# Patient Record
Sex: MALE | Race: WHITE | NOT HISPANIC OR LATINO | Employment: PART TIME | URBAN - METROPOLITAN AREA
[De-identification: names, ages, dates, MRNs, and addresses within clinical notes are randomized per-mention and may not be internally consistent; named-entity substitution may affect disease eponyms.]

---

## 2017-05-04 ENCOUNTER — APPOINTMENT (OUTPATIENT)
Dept: LAB | Facility: HOSPITAL | Age: 57
End: 2017-05-04

## 2017-05-04 ENCOUNTER — TELEPHONE (OUTPATIENT)
Dept: INTERNAL MEDICINE | Facility: CLINIC | Age: 57
End: 2017-05-04

## 2017-05-04 DIAGNOSIS — R50.81 FEVER IN OTHER DISEASES: Primary | ICD-10-CM

## 2017-05-04 PROBLEM — R50.9 FEVER: Status: ACTIVE | Noted: 2017-05-04

## 2017-05-04 LAB
BACTERIA UR QL AUTO: ABNORMAL /HPF
BILIRUB UR QL STRIP: NEGATIVE
CLARITY UR: CLEAR
COLOR UR: YELLOW
GLUCOSE UR STRIP-MCNC: NEGATIVE MG/DL
HGB UR QL STRIP.AUTO: NEGATIVE
HYALINE CASTS UR QL AUTO: ABNORMAL /LPF
KETONES UR QL STRIP: NEGATIVE
LEUKOCYTE ESTERASE UR QL STRIP.AUTO: ABNORMAL
NITRITE UR QL STRIP: NEGATIVE
PH UR STRIP.AUTO: 6 [PH] (ref 5–8)
PROT UR QL STRIP: NEGATIVE
RBC # UR: ABNORMAL /HPF
REF LAB TEST METHOD: ABNORMAL
SP GR UR STRIP: 1.01 (ref 1–1.03)
SQUAMOUS #/AREA URNS HPF: ABNORMAL /HPF
UROBILINOGEN UR QL STRIP: ABNORMAL
WBC UR QL AUTO: ABNORMAL /HPF

## 2017-05-05 ENCOUNTER — OFFICE VISIT (OUTPATIENT)
Dept: INTERNAL MEDICINE | Facility: CLINIC | Age: 57
End: 2017-05-05

## 2017-05-05 VITALS
SYSTOLIC BLOOD PRESSURE: 144 MMHG | TEMPERATURE: 97.4 F | OXYGEN SATURATION: 99 % | DIASTOLIC BLOOD PRESSURE: 84 MMHG | HEART RATE: 64 BPM | RESPIRATION RATE: 16 BRPM

## 2017-05-05 DIAGNOSIS — R50.9 FEVER, UNSPECIFIED FEVER CAUSE: ICD-10-CM

## 2017-05-05 DIAGNOSIS — N39.0 URINARY TRACT INFECTION, SITE UNSPECIFIED: Primary | ICD-10-CM

## 2017-05-05 LAB
ALBUMIN SERPL-MCNC: 3.6 G/DL (ref 3.2–4.8)
ALBUMIN/GLOB SERPL: 1.7 G/DL (ref 1.5–2.5)
ALP SERPL-CCNC: 42 U/L (ref 25–100)
ALT SERPL W P-5'-P-CCNC: 16 U/L (ref 7–40)
ANION GAP SERPL CALCULATED.3IONS-SCNC: 10 MMOL/L (ref 3–11)
AST SERPL-CCNC: 21 U/L (ref 0–33)
BASOPHILS # BLD AUTO: 0.02 10*3/MM3 (ref 0–0.2)
BASOPHILS NFR BLD AUTO: 0.3 % (ref 0–1)
BILIRUB SERPL-MCNC: 0.8 MG/DL (ref 0.3–1.2)
BUN BLD-MCNC: 26 MG/DL (ref 9–23)
BUN/CREAT SERPL: 13.7 (ref 7–25)
CALCIUM SPEC-SCNC: 8.6 MG/DL (ref 8.7–10.4)
CHLORIDE SERPL-SCNC: 88 MMOL/L (ref 99–109)
CO2 SERPL-SCNC: 20 MMOL/L (ref 20–31)
CREAT BLD-MCNC: 1.9 MG/DL (ref 0.6–1.3)
CRP SERPL-MCNC: 5.07 MG/DL (ref 0–1)
DEPRECATED RDW RBC AUTO: 54.1 FL (ref 37–54)
EOSINOPHIL # BLD AUTO: 0.15 10*3/MM3 (ref 0.1–0.3)
EOSINOPHIL NFR BLD AUTO: 2.4 % (ref 0–3)
ERYTHROCYTE [DISTWIDTH] IN BLOOD BY AUTOMATED COUNT: 14.8 % (ref 11.3–14.5)
GFR SERPL CREATININE-BSD FRML MDRD: 37 ML/MIN/1.73
GLOBULIN UR ELPH-MCNC: 2.1 GM/DL
GLUCOSE BLD-MCNC: 102 MG/DL (ref 70–100)
HCT VFR BLD AUTO: 39 % (ref 38.9–50.9)
HGB BLD-MCNC: 13.2 G/DL (ref 13.1–17.5)
IMM GRANULOCYTES # BLD: 0.06 10*3/MM3 (ref 0–0.03)
IMM GRANULOCYTES NFR BLD: 1 % (ref 0–0.6)
LARGE PLATELETS: NORMAL
LYMPHOCYTES # BLD AUTO: 0.61 10*3/MM3 (ref 0.6–4.8)
LYMPHOCYTES NFR BLD AUTO: 9.8 % (ref 24–44)
MCH RBC QN AUTO: 33.4 PG (ref 27–31)
MCHC RBC AUTO-ENTMCNC: 33.8 G/DL (ref 32–36)
MCV RBC AUTO: 98.7 FL (ref 80–99)
MONOCYTES # BLD AUTO: 0.85 10*3/MM3 (ref 0–1)
MONOCYTES NFR BLD AUTO: 13.7 % (ref 0–12)
NEUTROPHILS # BLD AUTO: 4.52 10*3/MM3 (ref 1.5–8.3)
NEUTROPHILS NFR BLD AUTO: 72.8 % (ref 41–71)
PLATELET # BLD AUTO: 123 10*3/MM3 (ref 150–450)
PMV BLD AUTO: 10.8 FL (ref 6–12)
POTASSIUM BLD-SCNC: 4.3 MMOL/L (ref 3.5–5.5)
PROT SERPL-MCNC: 5.7 G/DL (ref 5.7–8.2)
RBC # BLD AUTO: 3.95 10*6/MM3 (ref 4.2–5.76)
RBC MORPH BLD: NORMAL
SODIUM BLD-SCNC: 118 MMOL/L (ref 132–146)
WBC MORPH BLD: NORMAL
WBC NRBC COR # BLD: 6.21 10*3/MM3 (ref 3.5–10.8)

## 2017-05-05 PROCEDURE — 99213 OFFICE O/P EST LOW 20 MIN: CPT | Performed by: NURSE PRACTITIONER

## 2017-05-05 PROCEDURE — 36415 COLL VENOUS BLD VENIPUNCTURE: CPT | Performed by: NURSE PRACTITIONER

## 2017-05-06 ENCOUNTER — APPOINTMENT (OUTPATIENT)
Dept: GENERAL RADIOLOGY | Facility: HOSPITAL | Age: 57
End: 2017-05-06

## 2017-05-06 ENCOUNTER — HOSPITAL ENCOUNTER (INPATIENT)
Facility: HOSPITAL | Age: 57
LOS: 3 days | Discharge: HOME | End: 2017-05-09
Attending: EMERGENCY MEDICINE | Admitting: INTERNAL MEDICINE

## 2017-05-06 DIAGNOSIS — E87.1 HYPONATREMIA: ICD-10-CM

## 2017-05-06 DIAGNOSIS — N39.0 SEPSIS DUE TO URINARY TRACT INFECTION (HCC): Primary | ICD-10-CM

## 2017-05-06 DIAGNOSIS — N17.9 ACUTE RENAL INJURY (HCC): ICD-10-CM

## 2017-05-06 DIAGNOSIS — A41.9 SEPSIS DUE TO URINARY TRACT INFECTION (HCC): Primary | ICD-10-CM

## 2017-05-06 LAB
ALBUMIN SERPL-MCNC: 3.8 G/DL (ref 3.2–4.8)
ALBUMIN/GLOB SERPL: 1.4 G/DL (ref 1.5–2.5)
ALP SERPL-CCNC: 41 U/L (ref 25–100)
ALT SERPL W P-5'-P-CCNC: 18 U/L (ref 7–40)
AMYLASE SERPL-CCNC: 117 U/L (ref 30–118)
ANION GAP SERPL CALCULATED.3IONS-SCNC: 13 MMOL/L (ref 3–11)
AST SERPL-CCNC: 24 U/L (ref 0–33)
BACTERIA SPEC AEROBE CULT: ABNORMAL
BACTERIA UR QL AUTO: ABNORMAL /HPF
BASOPHILS # BLD AUTO: 0.03 10*3/MM3 (ref 0–0.2)
BASOPHILS NFR BLD AUTO: 0.4 % (ref 0–1)
BILIRUB SERPL-MCNC: 0.7 MG/DL (ref 0.3–1.2)
BILIRUB UR QL STRIP: NEGATIVE
BNP SERPL-MCNC: 185 PG/ML (ref 0–100)
BUN BLD-MCNC: 26 MG/DL (ref 9–23)
BUN/CREAT SERPL: 12.4 (ref 7–25)
CALCIUM SPEC-SCNC: 9.3 MG/DL (ref 8.7–10.4)
CHLORIDE SERPL-SCNC: 90 MMOL/L (ref 99–109)
CLARITY UR: CLEAR
CO2 SERPL-SCNC: 21 MMOL/L (ref 20–31)
COLOR UR: YELLOW
CREAT BLD-MCNC: 2.1 MG/DL (ref 0.6–1.3)
CRP SERPL-MCNC: 5.54 MG/DL (ref 0–1)
D-LACTATE SERPL-SCNC: 0.6 MMOL/L (ref 0.5–2)
D-LACTATE SERPL-SCNC: 0.9 MMOL/L (ref 0.5–2)
DEPRECATED RDW RBC AUTO: 53.3 FL (ref 37–54)
EOSINOPHIL # BLD AUTO: 0.09 10*3/MM3 (ref 0.1–0.3)
EOSINOPHIL NFR BLD AUTO: 1.1 % (ref 0–3)
ERYTHROCYTE [DISTWIDTH] IN BLOOD BY AUTOMATED COUNT: 14.7 % (ref 11.3–14.5)
GFR SERPL CREATININE-BSD FRML MDRD: 33 ML/MIN/1.73
GLOBULIN UR ELPH-MCNC: 2.7 GM/DL
GLUCOSE BLD-MCNC: 101 MG/DL (ref 70–100)
GLUCOSE UR STRIP-MCNC: NEGATIVE MG/DL
HCT VFR BLD AUTO: 43.4 % (ref 38.9–50.9)
HGB BLD-MCNC: 14.5 G/DL (ref 13.1–17.5)
HGB UR QL STRIP.AUTO: NEGATIVE
HOLD SPECIMEN: NORMAL
HYALINE CASTS UR QL AUTO: ABNORMAL /LPF
IMM GRANULOCYTES # BLD: 0.02 10*3/MM3 (ref 0–0.03)
IMM GRANULOCYTES NFR BLD: 0.2 % (ref 0–0.6)
KETONES UR QL STRIP: ABNORMAL
LEUKOCYTE ESTERASE UR QL STRIP.AUTO: ABNORMAL
LIPASE SERPL-CCNC: 210 U/L (ref 6–51)
LYMPHOCYTES # BLD AUTO: 0.54 10*3/MM3 (ref 0.6–4.8)
LYMPHOCYTES NFR BLD AUTO: 6.4 % (ref 24–44)
MAGNESIUM SERPL-MCNC: 1.7 MG/DL (ref 1.3–2.7)
MCH RBC QN AUTO: 33.1 PG (ref 27–31)
MCHC RBC AUTO-ENTMCNC: 33.4 G/DL (ref 32–36)
MCV RBC AUTO: 99.1 FL (ref 80–99)
MONOCYTES # BLD AUTO: 1.28 10*3/MM3 (ref 0–1)
MONOCYTES NFR BLD AUTO: 15.3 % (ref 0–12)
NEUTROPHILS # BLD AUTO: 6.43 10*3/MM3 (ref 1.5–8.3)
NEUTROPHILS NFR BLD AUTO: 76.6 % (ref 41–71)
NITRITE UR QL STRIP: NEGATIVE
PH UR STRIP.AUTO: 5.5 [PH] (ref 5–8)
PLATELET # BLD AUTO: 181 10*3/MM3 (ref 150–450)
PMV BLD AUTO: 9.9 FL (ref 6–12)
POTASSIUM BLD-SCNC: 4.2 MMOL/L (ref 3.5–5.5)
PROT SERPL-MCNC: 6.5 G/DL (ref 5.7–8.2)
PROT UR QL STRIP: NEGATIVE
RBC # BLD AUTO: 4.38 10*6/MM3 (ref 4.2–5.76)
RBC # UR: ABNORMAL /HPF
REF LAB TEST METHOD: ABNORMAL
SODIUM BLD-SCNC: 124 MMOL/L (ref 132–146)
SP GR UR STRIP: 1.01 (ref 1–1.03)
SQUAMOUS #/AREA URNS HPF: ABNORMAL /HPF
UROBILINOGEN UR QL STRIP: ABNORMAL
WBC NRBC COR # BLD: 8.39 10*3/MM3 (ref 3.5–10.8)
WBC UR QL AUTO: ABNORMAL /HPF
WHOLE BLOOD HOLD SPECIMEN: NORMAL
WHOLE BLOOD HOLD SPECIMEN: NORMAL

## 2017-05-06 PROCEDURE — 87077 CULTURE AEROBIC IDENTIFY: CPT | Performed by: EMERGENCY MEDICINE

## 2017-05-06 PROCEDURE — 86140 C-REACTIVE PROTEIN: CPT | Performed by: EMERGENCY MEDICINE

## 2017-05-06 PROCEDURE — 83605 ASSAY OF LACTIC ACID: CPT | Performed by: FAMILY MEDICINE

## 2017-05-06 PROCEDURE — 80053 COMPREHEN METABOLIC PANEL: CPT | Performed by: EMERGENCY MEDICINE

## 2017-05-06 PROCEDURE — 81001 URINALYSIS AUTO W/SCOPE: CPT | Performed by: EMERGENCY MEDICINE

## 2017-05-06 PROCEDURE — 87086 URINE CULTURE/COLONY COUNT: CPT | Performed by: EMERGENCY MEDICINE

## 2017-05-06 PROCEDURE — 83690 ASSAY OF LIPASE: CPT | Performed by: EMERGENCY MEDICINE

## 2017-05-06 PROCEDURE — 25010000002 HEPARIN (PORCINE) PER 1000 UNITS: Performed by: FAMILY MEDICINE

## 2017-05-06 PROCEDURE — 99284 EMERGENCY DEPT VISIT MOD MDM: CPT

## 2017-05-06 PROCEDURE — 25010000003 CEFTRIAXONE PER 250 MG: Performed by: EMERGENCY MEDICINE

## 2017-05-06 PROCEDURE — 82150 ASSAY OF AMYLASE: CPT | Performed by: EMERGENCY MEDICINE

## 2017-05-06 PROCEDURE — 85025 COMPLETE CBC W/AUTO DIFF WBC: CPT | Performed by: EMERGENCY MEDICINE

## 2017-05-06 PROCEDURE — 99223 1ST HOSP IP/OBS HIGH 75: CPT | Performed by: FAMILY MEDICINE

## 2017-05-06 PROCEDURE — 87040 BLOOD CULTURE FOR BACTERIA: CPT | Performed by: EMERGENCY MEDICINE

## 2017-05-06 PROCEDURE — 83880 ASSAY OF NATRIURETIC PEPTIDE: CPT | Performed by: EMERGENCY MEDICINE

## 2017-05-06 PROCEDURE — 71010 HC CHEST PA OR AP: CPT

## 2017-05-06 PROCEDURE — 87186 SC STD MICRODIL/AGAR DIL: CPT | Performed by: EMERGENCY MEDICINE

## 2017-05-06 PROCEDURE — 83735 ASSAY OF MAGNESIUM: CPT | Performed by: EMERGENCY MEDICINE

## 2017-05-06 PROCEDURE — 83605 ASSAY OF LACTIC ACID: CPT | Performed by: EMERGENCY MEDICINE

## 2017-05-06 RX ORDER — OXYBUTYNIN CHLORIDE 5 MG/1
5 TABLET ORAL 3 TIMES DAILY
Status: DISCONTINUED | OUTPATIENT
Start: 2017-05-06 | End: 2017-05-09 | Stop reason: HOSPADM

## 2017-05-06 RX ORDER — CALCIUM CARBONATE 200(500)MG
2 TABLET,CHEWABLE ORAL 2 TIMES DAILY PRN
Status: DISCONTINUED | OUTPATIENT
Start: 2017-05-06 | End: 2017-05-09 | Stop reason: HOSPADM

## 2017-05-06 RX ORDER — MULTIPLE VITAMINS W/ MINERALS TAB 9MG-400MCG
1 TAB ORAL DAILY
Status: DISCONTINUED | OUTPATIENT
Start: 2017-05-07 | End: 2017-05-09 | Stop reason: HOSPADM

## 2017-05-06 RX ORDER — SODIUM CHLORIDE 0.9 % (FLUSH) 0.9 %
1-10 SYRINGE (ML) INJECTION AS NEEDED
Status: DISCONTINUED | OUTPATIENT
Start: 2017-05-06 | End: 2017-05-09 | Stop reason: HOSPADM

## 2017-05-06 RX ORDER — SODIUM CHLORIDE 0.9 % (FLUSH) 0.9 %
10 SYRINGE (ML) INJECTION AS NEEDED
Status: DISCONTINUED | OUTPATIENT
Start: 2017-05-06 | End: 2017-05-09 | Stop reason: HOSPADM

## 2017-05-06 RX ORDER — MELATONIN
2000 DAILY
Status: DISCONTINUED | OUTPATIENT
Start: 2017-05-07 | End: 2017-05-09 | Stop reason: HOSPADM

## 2017-05-06 RX ORDER — CEFUROXIME AXETIL 250 MG/1
250 TABLET ORAL EVERY 12 HOURS
Qty: 14 TABLET | Refills: 0 | Status: SHIPPED | OUTPATIENT
Start: 2017-05-06 | End: 2017-06-19

## 2017-05-06 RX ORDER — ASCORBIC ACID 500 MG
500 TABLET ORAL DAILY
Status: DISCONTINUED | OUTPATIENT
Start: 2017-05-07 | End: 2017-05-09 | Stop reason: HOSPADM

## 2017-05-06 RX ORDER — HEPARIN SODIUM 5000 [USP'U]/ML
5000 INJECTION, SOLUTION INTRAVENOUS; SUBCUTANEOUS EVERY 12 HOURS SCHEDULED
Status: DISCONTINUED | OUTPATIENT
Start: 2017-05-06 | End: 2017-05-09 | Stop reason: HOSPADM

## 2017-05-06 RX ORDER — ONDANSETRON 2 MG/ML
4 INJECTION INTRAMUSCULAR; INTRAVENOUS EVERY 6 HOURS PRN
Status: DISCONTINUED | OUTPATIENT
Start: 2017-05-06 | End: 2017-05-09 | Stop reason: HOSPADM

## 2017-05-06 RX ORDER — CEFTRIAXONE SODIUM 1 G/50ML
1 INJECTION, SOLUTION INTRAVENOUS EVERY 24 HOURS
Status: DISCONTINUED | OUTPATIENT
Start: 2017-05-07 | End: 2017-05-09 | Stop reason: HOSPADM

## 2017-05-06 RX ORDER — LANOLIN ALCOHOL/MO/W.PET/CERES
1000 CREAM (GRAM) TOPICAL DAILY
Status: DISCONTINUED | OUTPATIENT
Start: 2017-05-07 | End: 2017-05-09 | Stop reason: HOSPADM

## 2017-05-06 RX ORDER — SODIUM CHLORIDE 9 MG/ML
100 INJECTION, SOLUTION INTRAVENOUS CONTINUOUS
Status: DISCONTINUED | OUTPATIENT
Start: 2017-05-06 | End: 2017-05-09 | Stop reason: HOSPADM

## 2017-05-06 RX ORDER — ACETAMINOPHEN 325 MG/1
650 TABLET ORAL EVERY 6 HOURS PRN
Status: DISCONTINUED | OUTPATIENT
Start: 2017-05-06 | End: 2017-05-09 | Stop reason: HOSPADM

## 2017-05-06 RX ORDER — CEFTRIAXONE SODIUM 2 G/50ML
2 INJECTION, SOLUTION INTRAVENOUS ONCE
Status: COMPLETED | OUTPATIENT
Start: 2017-05-06 | End: 2017-05-06

## 2017-05-06 RX ADMIN — HEPARIN SODIUM 5000 UNITS: 5000 INJECTION, SOLUTION INTRAVENOUS; SUBCUTANEOUS at 20:52

## 2017-05-06 RX ADMIN — SODIUM CHLORIDE 2382 ML: 9 INJECTION, SOLUTION INTRAVENOUS at 13:49

## 2017-05-06 RX ADMIN — CEFTRIAXONE SODIUM 2 G: 2 INJECTION, SOLUTION INTRAVENOUS at 14:15

## 2017-05-06 RX ADMIN — SODIUM CHLORIDE 100 ML/HR: 9 INJECTION, SOLUTION INTRAVENOUS at 22:08

## 2017-05-06 RX ADMIN — OXYBUTYNIN CHLORIDE 5 MG: 5 TABLET ORAL at 20:52

## 2017-05-07 LAB
ANION GAP SERPL CALCULATED.3IONS-SCNC: 9 MMOL/L (ref 3–11)
BUN BLD-MCNC: 26 MG/DL (ref 9–23)
BUN/CREAT SERPL: 13.7 (ref 7–25)
CALCIUM SPEC-SCNC: 8.2 MG/DL (ref 8.7–10.4)
CHLORIDE SERPL-SCNC: 98 MMOL/L (ref 99–109)
CO2 SERPL-SCNC: 21 MMOL/L (ref 20–31)
CREAT BLD-MCNC: 1.9 MG/DL (ref 0.6–1.3)
DEPRECATED RDW RBC AUTO: 54.7 FL (ref 37–54)
ERYTHROCYTE [DISTWIDTH] IN BLOOD BY AUTOMATED COUNT: 14.8 % (ref 11.3–14.5)
GFR SERPL CREATININE-BSD FRML MDRD: 37 ML/MIN/1.73
GLUCOSE BLD-MCNC: 76 MG/DL (ref 70–100)
HCT VFR BLD AUTO: 36 % (ref 38.9–50.9)
HGB BLD-MCNC: 11.9 G/DL (ref 13.1–17.5)
MCH RBC QN AUTO: 32.9 PG (ref 27–31)
MCHC RBC AUTO-ENTMCNC: 33.1 G/DL (ref 32–36)
MCV RBC AUTO: 99.4 FL (ref 80–99)
PLATELET # BLD AUTO: 161 10*3/MM3 (ref 150–450)
PMV BLD AUTO: 9.6 FL (ref 6–12)
POTASSIUM BLD-SCNC: 3.9 MMOL/L (ref 3.5–5.5)
RBC # BLD AUTO: 3.62 10*6/MM3 (ref 4.2–5.76)
SODIUM BLD-SCNC: 128 MMOL/L (ref 132–146)
WBC NRBC COR # BLD: 4.27 10*3/MM3 (ref 3.5–10.8)

## 2017-05-07 PROCEDURE — 80048 BASIC METABOLIC PNL TOTAL CA: CPT | Performed by: FAMILY MEDICINE

## 2017-05-07 PROCEDURE — 99232 SBSQ HOSP IP/OBS MODERATE 35: CPT | Performed by: INTERNAL MEDICINE

## 2017-05-07 PROCEDURE — 85027 COMPLETE CBC AUTOMATED: CPT | Performed by: FAMILY MEDICINE

## 2017-05-07 PROCEDURE — 25010000003 CEFTRIAXONE PER 250 MG: Performed by: FAMILY MEDICINE

## 2017-05-07 PROCEDURE — 25010000002 HEPARIN (PORCINE) PER 1000 UNITS: Performed by: FAMILY MEDICINE

## 2017-05-07 RX ADMIN — OXYBUTYNIN CHLORIDE 5 MG: 5 TABLET ORAL at 08:06

## 2017-05-07 RX ADMIN — HEPARIN SODIUM 5000 UNITS: 5000 INJECTION, SOLUTION INTRAVENOUS; SUBCUTANEOUS at 08:06

## 2017-05-07 RX ADMIN — MULTIPLE VITAMINS W/ MINERALS TAB 1 TABLET: TAB ORAL at 08:06

## 2017-05-07 RX ADMIN — ACETAMINOPHEN 650 MG: 325 TABLET ORAL at 19:30

## 2017-05-07 RX ADMIN — HEPARIN SODIUM 5000 UNITS: 5000 INJECTION, SOLUTION INTRAVENOUS; SUBCUTANEOUS at 21:35

## 2017-05-07 RX ADMIN — SODIUM CHLORIDE 100 ML/HR: 9 INJECTION, SOLUTION INTRAVENOUS at 08:06

## 2017-05-07 RX ADMIN — CYANOCOBALAMIN TAB 1000 MCG 1000 MCG: 1000 TAB at 08:06

## 2017-05-07 RX ADMIN — CEFTRIAXONE SODIUM 1 G: 1 INJECTION, SOLUTION INTRAVENOUS at 08:06

## 2017-05-07 RX ADMIN — OXYCODONE HYDROCHLORIDE AND ACETAMINOPHEN 500 MG: 500 TABLET ORAL at 08:06

## 2017-05-07 RX ADMIN — SODIUM CHLORIDE 100 ML/HR: 9 INJECTION, SOLUTION INTRAVENOUS at 18:14

## 2017-05-07 RX ADMIN — OXYBUTYNIN CHLORIDE 5 MG: 5 TABLET ORAL at 16:30

## 2017-05-07 RX ADMIN — OXYBUTYNIN CHLORIDE 5 MG: 5 TABLET ORAL at 21:36

## 2017-05-07 RX ADMIN — VITAMIN D, TAB 1000IU (100/BT) 2000 UNITS: 25 TAB at 08:06

## 2017-05-08 LAB
ANION GAP SERPL CALCULATED.3IONS-SCNC: 7 MMOL/L (ref 3–11)
BUN BLD-MCNC: 25 MG/DL (ref 9–23)
BUN/CREAT SERPL: 13.9 (ref 7–25)
CALCIUM SPEC-SCNC: 8.6 MG/DL (ref 8.7–10.4)
CHLORIDE SERPL-SCNC: 104 MMOL/L (ref 99–109)
CO2 SERPL-SCNC: 20 MMOL/L (ref 20–31)
CREAT BLD-MCNC: 1.8 MG/DL (ref 0.6–1.3)
GFR SERPL CREATININE-BSD FRML MDRD: 39 ML/MIN/1.73
GLUCOSE BLD-MCNC: 85 MG/DL (ref 70–100)
POTASSIUM BLD-SCNC: 3.9 MMOL/L (ref 3.5–5.5)
SODIUM BLD-SCNC: 131 MMOL/L (ref 132–146)

## 2017-05-08 PROCEDURE — 25010000003 CEFTRIAXONE PER 250 MG: Performed by: FAMILY MEDICINE

## 2017-05-08 PROCEDURE — 25010000002 HEPARIN (PORCINE) PER 1000 UNITS: Performed by: FAMILY MEDICINE

## 2017-05-08 PROCEDURE — 80048 BASIC METABOLIC PNL TOTAL CA: CPT | Performed by: INTERNAL MEDICINE

## 2017-05-08 PROCEDURE — 99232 SBSQ HOSP IP/OBS MODERATE 35: CPT | Performed by: NURSE PRACTITIONER

## 2017-05-08 RX ORDER — SACCHAROMYCES BOULARDII 250 MG
250 CAPSULE ORAL 2 TIMES DAILY
Status: DISCONTINUED | OUTPATIENT
Start: 2017-05-08 | End: 2017-05-09 | Stop reason: HOSPADM

## 2017-05-08 RX ADMIN — SODIUM CHLORIDE 100 ML/HR: 9 INJECTION, SOLUTION INTRAVENOUS at 21:07

## 2017-05-08 RX ADMIN — HEPARIN SODIUM 5000 UNITS: 5000 INJECTION, SOLUTION INTRAVENOUS; SUBCUTANEOUS at 21:07

## 2017-05-08 RX ADMIN — OXYCODONE HYDROCHLORIDE AND ACETAMINOPHEN 500 MG: 500 TABLET ORAL at 08:04

## 2017-05-08 RX ADMIN — CEFTRIAXONE SODIUM 1 G: 1 INJECTION, SOLUTION INTRAVENOUS at 08:04

## 2017-05-08 RX ADMIN — CYANOCOBALAMIN TAB 1000 MCG 1000 MCG: 1000 TAB at 08:04

## 2017-05-08 RX ADMIN — VITAMIN D, TAB 1000IU (100/BT) 2000 UNITS: 25 TAB at 08:04

## 2017-05-08 RX ADMIN — OXYBUTYNIN CHLORIDE 5 MG: 5 TABLET ORAL at 21:07

## 2017-05-08 RX ADMIN — OXYBUTYNIN CHLORIDE 5 MG: 5 TABLET ORAL at 08:04

## 2017-05-08 RX ADMIN — Medication 250 MG: at 16:28

## 2017-05-08 RX ADMIN — SODIUM CHLORIDE 100 ML/HR: 9 INJECTION, SOLUTION INTRAVENOUS at 03:23

## 2017-05-08 RX ADMIN — Medication 250 MG: at 12:55

## 2017-05-08 RX ADMIN — MULTIPLE VITAMINS W/ MINERALS TAB 1 TABLET: TAB ORAL at 08:04

## 2017-05-08 RX ADMIN — HEPARIN SODIUM 5000 UNITS: 5000 INJECTION, SOLUTION INTRAVENOUS; SUBCUTANEOUS at 08:04

## 2017-05-08 RX ADMIN — OXYBUTYNIN CHLORIDE 5 MG: 5 TABLET ORAL at 16:28

## 2017-05-09 VITALS
DIASTOLIC BLOOD PRESSURE: 96 MMHG | BODY MASS INDEX: 24.5 KG/M2 | SYSTOLIC BLOOD PRESSURE: 150 MMHG | WEIGHT: 175 LBS | HEART RATE: 75 BPM | OXYGEN SATURATION: 96 % | TEMPERATURE: 98.1 F | HEIGHT: 71 IN | RESPIRATION RATE: 18 BRPM

## 2017-05-09 LAB
ANION GAP SERPL CALCULATED.3IONS-SCNC: 11 MMOL/L (ref 3–11)
BACTERIA SPEC AEROBE CULT: ABNORMAL
BUN BLD-MCNC: 19 MG/DL (ref 9–23)
BUN/CREAT SERPL: 11.9 (ref 7–25)
CALCIUM SPEC-SCNC: 9.5 MG/DL (ref 8.7–10.4)
CHLORIDE SERPL-SCNC: 104 MMOL/L (ref 99–109)
CO2 SERPL-SCNC: 16 MMOL/L (ref 20–31)
CREAT BLD-MCNC: 1.6 MG/DL (ref 0.6–1.3)
GFR SERPL CREATININE-BSD FRML MDRD: 45 ML/MIN/1.73
GLUCOSE BLD-MCNC: 96 MG/DL (ref 70–100)
POTASSIUM BLD-SCNC: 3.8 MMOL/L (ref 3.5–5.5)
SODIUM BLD-SCNC: 131 MMOL/L (ref 132–146)

## 2017-05-09 PROCEDURE — 25010000003 CEFTRIAXONE PER 250 MG: Performed by: FAMILY MEDICINE

## 2017-05-09 PROCEDURE — 80048 BASIC METABOLIC PNL TOTAL CA: CPT | Performed by: NURSE PRACTITIONER

## 2017-05-09 PROCEDURE — 99239 HOSP IP/OBS DSCHRG MGMT >30: CPT | Performed by: NURSE PRACTITIONER

## 2017-05-09 PROCEDURE — 25010000002 HEPARIN (PORCINE) PER 1000 UNITS: Performed by: FAMILY MEDICINE

## 2017-05-09 RX ORDER — SACCHAROMYCES BOULARDII 250 MG
250 CAPSULE ORAL 2 TIMES DAILY
Qty: 28 CAPSULE | Refills: 0 | Status: SHIPPED | OUTPATIENT
Start: 2017-05-09 | End: 2017-05-23

## 2017-05-09 RX ADMIN — CEFTRIAXONE SODIUM 1 G: 1 INJECTION, SOLUTION INTRAVENOUS at 08:43

## 2017-05-09 RX ADMIN — HEPARIN SODIUM 5000 UNITS: 5000 INJECTION, SOLUTION INTRAVENOUS; SUBCUTANEOUS at 08:45

## 2017-05-09 RX ADMIN — OXYCODONE HYDROCHLORIDE AND ACETAMINOPHEN 500 MG: 500 TABLET ORAL at 08:44

## 2017-05-09 RX ADMIN — CYANOCOBALAMIN TAB 1000 MCG 1000 MCG: 1000 TAB at 08:45

## 2017-05-09 RX ADMIN — MULTIPLE VITAMINS W/ MINERALS TAB 1 TABLET: TAB ORAL at 08:44

## 2017-05-09 RX ADMIN — Medication 250 MG: at 08:44

## 2017-05-09 RX ADMIN — OXYBUTYNIN CHLORIDE 5 MG: 5 TABLET ORAL at 08:44

## 2017-05-09 RX ADMIN — VITAMIN D, TAB 1000IU (100/BT) 2000 UNITS: 25 TAB at 08:44

## 2017-05-10 ENCOUNTER — TELEPHONE (OUTPATIENT)
Dept: INTERNAL MEDICINE | Facility: CLINIC | Age: 57
End: 2017-05-10

## 2017-05-11 ENCOUNTER — TRANSITIONAL CARE MANAGEMENT TELEPHONE ENCOUNTER (OUTPATIENT)
Dept: INTERNAL MEDICINE | Facility: CLINIC | Age: 57
End: 2017-05-11

## 2017-05-11 ENCOUNTER — TELEPHONE (OUTPATIENT)
Dept: INTERNAL MEDICINE | Facility: CLINIC | Age: 57
End: 2017-05-11

## 2017-05-11 LAB
BACTERIA SPEC AEROBE CULT: NORMAL
BACTERIA SPEC AEROBE CULT: NORMAL

## 2017-05-12 ENCOUNTER — OFFICE VISIT (OUTPATIENT)
Dept: INTERNAL MEDICINE | Facility: CLINIC | Age: 57
End: 2017-05-12

## 2017-05-12 VITALS
HEART RATE: 72 BPM | SYSTOLIC BLOOD PRESSURE: 110 MMHG | TEMPERATURE: 97.4 F | OXYGEN SATURATION: 97 % | DIASTOLIC BLOOD PRESSURE: 70 MMHG | RESPIRATION RATE: 16 BRPM

## 2017-05-12 DIAGNOSIS — A41.9 SEPSIS SECONDARY TO UTI (HCC): Primary | ICD-10-CM

## 2017-05-12 DIAGNOSIS — N39.0 SEPSIS SECONDARY TO UTI (HCC): Primary | ICD-10-CM

## 2017-05-12 DIAGNOSIS — E87.1 HYPONATREMIA: ICD-10-CM

## 2017-05-12 DIAGNOSIS — N39.0 URINARY TRACT INFECTION WITHOUT HEMATURIA, SITE UNSPECIFIED: ICD-10-CM

## 2017-05-12 DIAGNOSIS — R79.89 ELEVATED SERUM CREATININE: ICD-10-CM

## 2017-05-12 LAB
ANION GAP SERPL CALCULATED.3IONS-SCNC: 4 MMOL/L (ref 3–11)
BASOPHILS # BLD AUTO: 0.08 10*3/MM3 (ref 0–0.2)
BASOPHILS NFR BLD AUTO: 1.5 % (ref 0–1)
BUN BLD-MCNC: 15 MG/DL (ref 9–23)
BUN/CREAT SERPL: 25 (ref 7–25)
CALCIUM SPEC-SCNC: 9.1 MG/DL (ref 8.7–10.4)
CHLORIDE SERPL-SCNC: 100 MMOL/L (ref 99–109)
CO2 SERPL-SCNC: 29 MMOL/L (ref 20–31)
CREAT BLD-MCNC: 0.6 MG/DL (ref 0.6–1.3)
CRP SERPL-MCNC: 2.2 MG/DL (ref 0–1)
DEPRECATED RDW RBC AUTO: 53.1 FL (ref 37–54)
EOSINOPHIL # BLD AUTO: 0.27 10*3/MM3 (ref 0.1–0.3)
EOSINOPHIL NFR BLD AUTO: 4.9 % (ref 0–3)
ERYTHROCYTE [DISTWIDTH] IN BLOOD BY AUTOMATED COUNT: 14.9 % (ref 11.3–14.5)
GFR SERPL CREATININE-BSD FRML MDRD: 138 ML/MIN/1.73
GLUCOSE BLD-MCNC: 84 MG/DL (ref 70–100)
HCT VFR BLD AUTO: 37 % (ref 38.9–50.9)
HGB BLD-MCNC: 12.4 G/DL (ref 13.1–17.5)
IMM GRANULOCYTES # BLD: 0.02 10*3/MM3 (ref 0–0.03)
IMM GRANULOCYTES NFR BLD: 0.4 % (ref 0–0.6)
LYMPHOCYTES # BLD AUTO: 0.8 10*3/MM3 (ref 0.6–4.8)
LYMPHOCYTES NFR BLD AUTO: 14.5 % (ref 24–44)
MCH RBC QN AUTO: 32.7 PG (ref 27–31)
MCHC RBC AUTO-ENTMCNC: 33.5 G/DL (ref 32–36)
MCV RBC AUTO: 97.6 FL (ref 80–99)
MONOCYTES # BLD AUTO: 0.69 10*3/MM3 (ref 0–1)
MONOCYTES NFR BLD AUTO: 12.5 % (ref 0–12)
NEUTROPHILS # BLD AUTO: 3.65 10*3/MM3 (ref 1.5–8.3)
NEUTROPHILS NFR BLD AUTO: 66.2 % (ref 41–71)
PLATELET # BLD AUTO: 236 10*3/MM3 (ref 150–450)
PMV BLD AUTO: 9.9 FL (ref 6–12)
POTASSIUM BLD-SCNC: 4.1 MMOL/L (ref 3.5–5.5)
RBC # BLD AUTO: 3.79 10*6/MM3 (ref 4.2–5.76)
SODIUM BLD-SCNC: 133 MMOL/L (ref 132–146)
WBC NRBC COR # BLD: 5.51 10*3/MM3 (ref 3.5–10.8)

## 2017-05-12 PROCEDURE — 86140 C-REACTIVE PROTEIN: CPT | Performed by: NURSE PRACTITIONER

## 2017-05-12 PROCEDURE — 80048 BASIC METABOLIC PNL TOTAL CA: CPT | Performed by: NURSE PRACTITIONER

## 2017-05-12 PROCEDURE — 85025 COMPLETE CBC W/AUTO DIFF WBC: CPT | Performed by: NURSE PRACTITIONER

## 2017-05-12 PROCEDURE — 99496 TRANSJ CARE MGMT HIGH F2F 7D: CPT | Performed by: NURSE PRACTITIONER

## 2017-05-12 PROCEDURE — 36415 COLL VENOUS BLD VENIPUNCTURE: CPT | Performed by: NURSE PRACTITIONER

## 2017-05-15 ENCOUNTER — TELEPHONE (OUTPATIENT)
Dept: INTERNAL MEDICINE | Facility: CLINIC | Age: 57
End: 2017-05-15

## 2017-05-15 PROBLEM — R79.89 ELEVATED SERUM CREATININE: Status: ACTIVE | Noted: 2017-05-15

## 2017-06-19 ENCOUNTER — OFFICE VISIT (OUTPATIENT)
Dept: INTERNAL MEDICINE | Facility: CLINIC | Age: 57
End: 2017-06-19

## 2017-06-19 VITALS
TEMPERATURE: 99.7 F | DIASTOLIC BLOOD PRESSURE: 80 MMHG | HEART RATE: 84 BPM | RESPIRATION RATE: 16 BRPM | SYSTOLIC BLOOD PRESSURE: 120 MMHG | OXYGEN SATURATION: 99 %

## 2017-06-19 DIAGNOSIS — G82.20 PARAPLEGIA (HCC): ICD-10-CM

## 2017-06-19 DIAGNOSIS — N39.0 URINARY TRACT INFECTION WITHOUT HEMATURIA, SITE UNSPECIFIED: Primary | ICD-10-CM

## 2017-06-19 DIAGNOSIS — E87.1 HYPONATREMIA: ICD-10-CM

## 2017-06-19 DIAGNOSIS — R33.9 RETENTION OF URINE: ICD-10-CM

## 2017-06-19 DIAGNOSIS — R79.89 ELEVATED SERUM CREATININE: ICD-10-CM

## 2017-06-19 PROBLEM — N17.9 AKI (ACUTE KIDNEY INJURY) (HCC): Status: RESOLVED | Noted: 2017-05-06 | Resolved: 2017-06-19

## 2017-06-19 PROBLEM — R50.9 FEVER: Status: RESOLVED | Noted: 2017-05-04 | Resolved: 2017-06-19

## 2017-06-19 PROBLEM — A41.9 SEPSIS SECONDARY TO UTI (HCC): Status: RESOLVED | Noted: 2017-05-06 | Resolved: 2017-06-19

## 2017-06-19 LAB
ALBUMIN SERPL-MCNC: 4.2 G/DL (ref 3.2–4.8)
ALBUMIN/GLOB SERPL: 1.4 G/DL (ref 1.5–2.5)
ALP SERPL-CCNC: 68 U/L (ref 25–100)
ALT SERPL W P-5'-P-CCNC: 18 U/L (ref 7–40)
ANION GAP SERPL CALCULATED.3IONS-SCNC: 13 MMOL/L (ref 3–11)
AST SERPL-CCNC: 25 U/L (ref 0–33)
BASOPHILS # BLD AUTO: 0.05 10*3/MM3 (ref 0–0.2)
BASOPHILS NFR BLD AUTO: 0.5 % (ref 0–1)
BILIRUB SERPL-MCNC: 1 MG/DL (ref 0.3–1.2)
BUN BLD-MCNC: 6 MG/DL (ref 9–23)
BUN/CREAT SERPL: 30 (ref 7–25)
CALCIUM SPEC-SCNC: 9.9 MG/DL (ref 8.7–10.4)
CHLORIDE SERPL-SCNC: 97 MMOL/L (ref 99–109)
CO2 SERPL-SCNC: 22 MMOL/L (ref 20–31)
CREAT BLD-MCNC: 0.2 MG/DL (ref 0.6–1.3)
CRP SERPL-MCNC: 3.82 MG/DL (ref 0–1)
DEPRECATED RDW RBC AUTO: 52.8 FL (ref 37–54)
EOSINOPHIL # BLD AUTO: 0.12 10*3/MM3 (ref 0.1–0.3)
EOSINOPHIL NFR BLD AUTO: 1.2 % (ref 0–3)
ERYTHROCYTE [DISTWIDTH] IN BLOOD BY AUTOMATED COUNT: 14.4 % (ref 11.3–14.5)
GFR SERPL CREATININE-BSD FRML MDRD: >150 ML/MIN/1.73
GLOBULIN UR ELPH-MCNC: 2.9 GM/DL
GLUCOSE BLD-MCNC: 74 MG/DL (ref 70–100)
HCT VFR BLD AUTO: 37.2 % (ref 38.9–50.9)
HGB BLD-MCNC: 12 G/DL (ref 13.1–17.5)
IMM GRANULOCYTES # BLD: 0.02 10*3/MM3 (ref 0–0.03)
IMM GRANULOCYTES NFR BLD: 0.2 % (ref 0–0.6)
LYMPHOCYTES # BLD AUTO: 0.78 10*3/MM3 (ref 0.6–4.8)
LYMPHOCYTES NFR BLD AUTO: 7.6 % (ref 24–44)
MCH RBC QN AUTO: 32.3 PG (ref 27–31)
MCHC RBC AUTO-ENTMCNC: 32.3 G/DL (ref 32–36)
MCV RBC AUTO: 100.3 FL (ref 80–99)
MONOCYTES # BLD AUTO: 0.79 10*3/MM3 (ref 0–1)
MONOCYTES NFR BLD AUTO: 7.7 % (ref 0–12)
NEUTROPHILS # BLD AUTO: 8.52 10*3/MM3 (ref 1.5–8.3)
NEUTROPHILS NFR BLD AUTO: 82.8 % (ref 41–71)
PLATELET # BLD AUTO: 266 10*3/MM3 (ref 150–450)
PMV BLD AUTO: 9.5 FL (ref 6–12)
POTASSIUM BLD-SCNC: 4.2 MMOL/L (ref 3.5–5.5)
PROT SERPL-MCNC: 7.1 G/DL (ref 5.7–8.2)
RBC # BLD AUTO: 3.71 10*6/MM3 (ref 4.2–5.76)
SODIUM BLD-SCNC: 132 MMOL/L (ref 132–146)
WBC NRBC COR # BLD: 10.28 10*3/MM3 (ref 3.5–10.8)

## 2017-06-19 PROCEDURE — 86140 C-REACTIVE PROTEIN: CPT | Performed by: INTERNAL MEDICINE

## 2017-06-19 PROCEDURE — 99213 OFFICE O/P EST LOW 20 MIN: CPT | Performed by: INTERNAL MEDICINE

## 2017-06-19 PROCEDURE — 36415 COLL VENOUS BLD VENIPUNCTURE: CPT | Performed by: INTERNAL MEDICINE

## 2017-06-19 PROCEDURE — G0438 PPPS, INITIAL VISIT: HCPCS | Performed by: INTERNAL MEDICINE

## 2017-06-19 PROCEDURE — 80053 COMPREHEN METABOLIC PANEL: CPT | Performed by: INTERNAL MEDICINE

## 2017-06-19 PROCEDURE — 85025 COMPLETE CBC W/AUTO DIFF WBC: CPT | Performed by: INTERNAL MEDICINE

## 2017-06-19 RX ORDER — ACETAMINOPHEN 160 MG
2000 TABLET,DISINTEGRATING ORAL DAILY
Qty: 100 CAPSULE | Refills: 3 | Status: SHIPPED | OUTPATIENT
Start: 2017-06-19

## 2017-06-19 NOTE — PROGRESS NOTES
QUICK REFERENCE INFORMATION:  The ABCs of the Annual Wellness Visit    Subsequent Medicare Wellness Visit    HEALTH RISK ASSESSMENT    1960    Recent Hospitalizations:  Recently treated at the following:  [facility].        Current Medical Providers:  Patient Care Team:  Jose Raul Correia MD as PCP - General        Smoking Status:  History   Smoking Status   • Never Smoker   Smokeless Tobacco   • Never Used       Alcohol Consumption:  History   Alcohol Use   • 3.6 oz/week   • 6 Glasses of wine per week       Depression Screen:   PHQ-9 Depression Screening 6/19/2017   Little interest or pleasure in doing things 0   Feeling down, depressed, or hopeless 0   Trouble falling or staying asleep, or sleeping too much -   Feeling tired or having little energy -   Poor appetite or overeating -   Feeling bad about yourself - or that you are a failure or have let yourself or your family down -   Trouble concentrating on things, such as reading the newspaper or watching television -   Moving or speaking so slowly that other people could have noticed. Or the opposite - being so fidgety or restless that you have been moving around a lot more than usual -   Thoughts that you would be better off dead, or of hurting yourself in some way -   PHQ-9 Total Score 0       Health Habits and Functional and Cognitive Screening:  Functional & Cognitive Status 6/19/2017   Do you have difficulty preparing food and eating? No   Do you have difficulty bathing yourself? No   Do you have difficulty getting dressed? No   Do you have difficulty using the toilet? No   Do you have difficulty moving around from place to place? No   In the past year have you fallen or experienced a near fall? No   Do you need help using the phone?  No   Are you deaf or do you have serious difficulty hearing?  No   Do you need help with transportation? No   Do you need help shopping? No   Do you need help preparing meals?  No   Do you need help with  housework?  No   Do you need help with laundry? No   Do you need help taking your medications? No   Do you need help managing money? No   Do you have difficulty concentrating, remembering or making decisions? -           Does the patient have evidence of cognitive impairment? No    Aspirin use counseling: Does not need ASA (and currently is not on it)      Recent Lab Results:  CMP:  Lab Results   Component Value Date    BUN 15 05/12/2017    CREATININE 0.60 05/12/2017    EGFRIFNONA 138 05/12/2017    BCR 25.0 05/12/2017     05/12/2017    K 4.1 05/12/2017    CO2 29.0 05/12/2017    CALCIUM 9.1 05/12/2017    ALBUMIN 3.80 05/06/2017    LABIL2 1.4 (L) 05/06/2017    BILITOT 0.7 05/06/2017    ALKPHOS 41 05/06/2017    AST 24 05/06/2017    ALT 18 05/06/2017     Lipid Panel:  Lab Results   Component Value Date    CHOL 117 12/13/2016    TRIG 43 12/13/2016    HDL 72 (H) 12/13/2016    LDLDIRECT 26 12/13/2016     HbA1c:       Visual Acuity:  No exam data present    Age-appropriate Screening Schedule:  Refer to the list below for future screening recommendations based on patient's age, sex and/or medical conditions. Orders for these recommended tests are listed in the plan section. The patient has been provided with a written plan.    Health Maintenance   Topic Date Due   • COLONOSCOPY  11/19/2016   • INFLUENZA VACCINE  08/01/2017   • TDAP/TD VACCINES (2 - Td) 01/01/2019        Subjective   History of Present Illness    Baudilio Toledo is a 56 y.o. male who presents for an Subsequent Wellness Visit.    The following portions of the patient's history were reviewed and updated as appropriate: allergies, current medications, past family history, past medical history, past social history, past surgical history and problem list.    Outpatient Medications Prior to Visit   Medication Sig Dispense Refill   • Ascorbic Acid (VITAMIN C) 500 MG capsule Take 500 mg by mouth 4 (Four) Times a Day.     • cefuroxime (CEFTIN) 250 MG tablet Take 1  tablet by mouth Every 12 (Twelve) Hours. 14 tablet 0   • Cholecalciferol (VITAMIN D3) 2000 UNITS capsule Take 2,000 Units by mouth Daily.     • Multiple Vitamins-Minerals (CENTRUM SILVER) tablet Take 1 tablet by mouth Daily.     • nitrofurantoin (MACRODANTIN) 50 MG capsule Take 50 mg by mouth Daily. prophylactic     • oxybutynin (DITROPAN) 5 MG tablet Take 5 mg by mouth 3 (Three) Times a Day.     • vitamin B-12 (CYANOCOBALAMIN) 1000 MCG tablet Take 1,000 mcg by mouth Daily.       No facility-administered medications prior to visit.        Patient Active Problem List   Diagnosis   • Atopic rhinitis   • Shortness of breath   • Closed fracture of cervical vertebra   • Paraplegia   • Priapism   • Hypertonicity of bladder   • Retention of urine   • Urinary tract infection   • Cobalamin deficiency   • Vitamin D deficiency   • Preventative health care   • Frailty   • Fever   • Sepsis secondary to UTI   • Hyponatremia   • JUANA (acute kidney injury)   • Elevated serum creatinine       Advance Care Planning:  has an advance directive - a copy HAS NOT been provided    Identification of Risk Factors:  Risk factors include: unhealthy diet, inactivity, lack of transportation, caretaker stress and polypharmacy.    Review of Systems    Compared to one year ago, the patient feels his physical health is the same.  Compared to one year ago, the patient feels his mental health is the same.    Objective     Physical Exam    Vitals:    06/19/17 1522   BP: 120/80   BP Location: Left arm   Patient Position: Sitting   Cuff Size: Adult   Pulse: 84   Resp: 16   Temp: 99.7 °F (37.6 °C)   TempSrc: Oral   SpO2: 99%       There is no height or weight on file to calculate BMI.  Discussed the patient's BMI with him. The BMI is in the acceptable range.    Assessment/Plan   Patient Self-Management and Personalized Health Advice  The patient has been provided with information about: diet, exercise and fall prevention and preventive services including:    · Alcohol use counseling completed, Fall Risk assessment done, Fall Risk plan of care done.    Visit Diagnoses:  No diagnosis found.    No orders of the defined types were placed in this encounter.      Outpatient Encounter Prescriptions as of 6/19/2017   Medication Sig Dispense Refill   • Ascorbic Acid (VITAMIN C) 500 MG capsule Take 500 mg by mouth 4 (Four) Times a Day.     • cefuroxime (CEFTIN) 250 MG tablet Take 1 tablet by mouth Every 12 (Twelve) Hours. 14 tablet 0   • Cholecalciferol (VITAMIN D3) 2000 UNITS capsule Take 2,000 Units by mouth Daily.     • Multiple Vitamins-Minerals (CENTRUM SILVER) tablet Take 1 tablet by mouth Daily.     • nitrofurantoin (MACRODANTIN) 50 MG capsule Take 50 mg by mouth Daily. prophylactic     • oxybutynin (DITROPAN) 5 MG tablet Take 5 mg by mouth 3 (Three) Times a Day.     • vitamin B-12 (CYANOCOBALAMIN) 1000 MCG tablet Take 1,000 mcg by mouth Daily.       No facility-administered encounter medications on file as of 6/19/2017.        Reviewed use of high risk medication in the elderly: yes  Reviewed for potential of harmful drug interactions in the elderly: yes    Follow Up:  No Follow-up on file.     An After Visit Summary and PPPS with all of these plans were given to the patient.     The wellness exam has been reviewed in detail.  The patient has been fully counseled on preventative guidelines for vaccines, cancer screenings, and other health maintenance needs.  Functional testing has been performed to assess capacity for independent living and need for other medical interventions.   The patient was counseled on maintaining a lifestyle to promote good health and to minimize chronic diseases.  The patient has been assisted with scheduling healthcare procedures for the coming year and given a written document outlining these recommendations.    Electronically signed Jose Raul Correia M.D.6/21/2017 7:31 AM

## 2017-06-19 NOTE — PROGRESS NOTES
Subjective   Baudilio Toledo is a 56 y.o. male.     History of Present Illness     The patient was admitted to [facility] May 6 through May 9 acute pyelonephritis and sepsis.  This is the worst urinary tract infection he has ever experienced.  He has finished his acute antibiotic therapy.  He has a chronic dilated bladder and does frequent catheterizations.  He continues on nitrofurantoin for chronic suppression.  He feels his energy level has returned.  He is back to normal daily activities.    The following portions of the patient's history were reviewed and updated as appropriate: allergies, current medications, past family history, past medical history, past social history, past surgical history and problem list.    Review of Systems   Constitutional: Negative for appetite change and fatigue.   HENT: Positive for congestion and sneezing.         Allergy symptoms mild   Respiratory: Negative for cough and shortness of breath.    Cardiovascular: Negative for chest pain and palpitations.   Gastrointestinal: Negative for abdominal distention and nausea.   Neurological: Negative for dizziness and light-headedness.       Objective   Blood pressure 120/80, pulse 84, temperature 99.7 °F (37.6 °C), temperature source Oral, resp. rate 16, SpO2 99 %.    Physical Exam   Constitutional: He is oriented to person, place, and time. He appears well-developed and well-nourished. No distress.   Cardiovascular: Normal rate, regular rhythm and normal heart sounds.    Pulmonary/Chest: Effort normal. He has no wheezes. He has no rales.   Sounds generally depressed   Neurological: He is alert and oriented to person, place, and time.   paraparesis is stable.   Psychiatric: He has a normal mood and affect. His behavior is normal. Judgment and thought content normal.   Nursing note and vitals reviewed.    Procedures  Assessment/Plan   Baudilio was seen today for annual exam and urinary tract infection.    Diagnoses and all  orders for this visit:    Urinary tract infection without hematuria, site unspecified  -     Cancel: CBC & Differential  -     Cancel: C-reactive Protein  -     Cancel: Urinalysis With / Culture If Indicated; Future  -     CBC & Differential  -     Comprehensive Metabolic Panel  -     C-reactive Protein  -     Urinalysis With / Culture If Indicated  -     CBC Auto Differential    Retention of urine    Hyponatremia  -     Cancel: Comprehensive Metabolic Panel    Elevated serum creatinine  -     Cancel: Comprehensive Metabolic Panel    Paraplegia    Other orders  -     Cholecalciferol (VITAMIN D3) 2000 UNITS capsule; Take 1 capsule by mouth Daily.    I encouraged the patient continue on his daily low-dose nitrofurantoin.  Daily sterile catheterizations will also help reduce his chance of chronic bladder infections.    The patient experienced severe sepsis with his pyelonephritis last month.  He seems to have fully recovered.  His blood count shows a mild anemia which is baseline.  His C-reactive protein is only minimally elevated.    His creatinine has markedly elevated with his acute illness one month ago.  The creatinine has returned to baseline indicating resolution of acute renal injury.    The wellness exam has been reviewed in detail.  The patient has been fully counseled on preventative guidelines for vaccines, cancer screenings, and other health maintenance needs.  Functional testing has been performed to assess capacity for independent living and need for other medical interventions.   The patient was counseled on maintaining a lifestyle to promote good health and to minimize chronic diseases.  The patient has been assisted with scheduling healthcare procedures for the coming year and given a written document outlining these recommendations.    Patient Instructions   1.  Send over clean urine specimen - this week for culture.    2.  Continue usual medicines and supplements - as listed.    3.  Return visit  December - annual checkup fasting.    4.  Blood count and chemistry panel are acceptable and require no change in treatment.    Electronically signed Jose Raul Correia M.D.6/21/2017 7:33 AM

## 2017-06-19 NOTE — PATIENT INSTRUCTIONS
1.  Send over clean urine specimen - this week for culture.    2.  Continue usual medicines and supplements - as listed.    3.  Return visit December - annual checkup fasting.

## 2017-06-22 ENCOUNTER — TELEPHONE (OUTPATIENT)
Dept: INTERNAL MEDICINE | Facility: CLINIC | Age: 57
End: 2017-06-22

## 2017-09-01 ENCOUNTER — APPOINTMENT (OUTPATIENT)
Dept: LAB | Facility: HOSPITAL | Age: 57
End: 2017-09-01

## 2017-09-01 ENCOUNTER — TELEPHONE (OUTPATIENT)
Dept: INTERNAL MEDICINE | Facility: CLINIC | Age: 57
End: 2017-09-01

## 2017-09-01 DIAGNOSIS — N39.0 URINARY TRACT INFECTION WITHOUT HEMATURIA, SITE UNSPECIFIED: Primary | ICD-10-CM

## 2017-09-01 LAB
BACTERIA UR QL AUTO: ABNORMAL /HPF
BILIRUB UR QL STRIP: NEGATIVE
CLARITY UR: ABNORMAL
COLOR UR: YELLOW
GLUCOSE UR STRIP-MCNC: NEGATIVE MG/DL
HGB UR QL STRIP.AUTO: NEGATIVE
HYALINE CASTS UR QL AUTO: ABNORMAL /LPF
KETONES UR QL STRIP: NEGATIVE
LEUKOCYTE ESTERASE UR QL STRIP.AUTO: NEGATIVE
NITRITE UR QL STRIP: NEGATIVE
PH UR STRIP.AUTO: 7 [PH] (ref 5–8)
PROT UR QL STRIP: NEGATIVE
RBC # UR: ABNORMAL /HPF
REF LAB TEST METHOD: ABNORMAL
SP GR UR STRIP: 1.01 (ref 1–1.03)
SQUAMOUS #/AREA URNS HPF: ABNORMAL /HPF
UROBILINOGEN UR QL STRIP: ABNORMAL
WBC UR QL AUTO: ABNORMAL /HPF

## 2017-09-01 PROCEDURE — 81001 URINALYSIS AUTO W/SCOPE: CPT | Performed by: INTERNAL MEDICINE

## 2017-09-01 NOTE — TELEPHONE ENCOUNTER
----- Message from Rafael De Anda sent at 9/1/2017 12:20 PM EDT -----  Contact: [facility]  Lab Request:  Remind TGF to put in a lab order for a UA on [facility]. He will be at the lab between 1:30 and 1:45 today.  [facility] [phone].

## 2017-09-01 NOTE — TELEPHONE ENCOUNTER
Pt called believes he has an UTI. Has tightness around his belly, mild swelling in his testicles, and felt like he had a fever last night. Is eating & drinking. He doesn't feel bad enough that he needs to come in today. Advised per TGF to drop off urine spec today and come in for ox Tues, monitor temp, page TGF over the weekend if needed. Pt verb understanding.

## 2017-12-18 ENCOUNTER — OFFICE VISIT (OUTPATIENT)
Dept: INTERNAL MEDICINE | Facility: CLINIC | Age: 57
End: 2017-12-18

## 2017-12-18 VITALS
DIASTOLIC BLOOD PRESSURE: 60 MMHG | TEMPERATURE: 100.4 F | SYSTOLIC BLOOD PRESSURE: 100 MMHG | HEART RATE: 72 BPM | OXYGEN SATURATION: 96 % | RESPIRATION RATE: 16 BRPM

## 2017-12-18 DIAGNOSIS — R33.9 RETENTION OF URINE: ICD-10-CM

## 2017-12-18 DIAGNOSIS — Z12.5 SCREENING PSA (PROSTATE SPECIFIC ANTIGEN): ICD-10-CM

## 2017-12-18 DIAGNOSIS — E55.9 VITAMIN D DEFICIENCY: ICD-10-CM

## 2017-12-18 DIAGNOSIS — G82.20 PARAPLEGIA (HCC): ICD-10-CM

## 2017-12-18 DIAGNOSIS — H10.13 ALLERGIC CONJUNCTIVITIS OF BOTH EYES: ICD-10-CM

## 2017-12-18 DIAGNOSIS — E87.1 HYPONATREMIA: ICD-10-CM

## 2017-12-18 DIAGNOSIS — R54 FRAILTY: ICD-10-CM

## 2017-12-18 DIAGNOSIS — N39.0 URINARY TRACT INFECTION WITHOUT HEMATURIA, SITE UNSPECIFIED: ICD-10-CM

## 2017-12-18 DIAGNOSIS — E53.8 COBALAMIN DEFICIENCY: ICD-10-CM

## 2017-12-18 DIAGNOSIS — J30.2 CHRONIC SEASONAL ALLERGIC RHINITIS, UNSPECIFIED TRIGGER: Primary | ICD-10-CM

## 2017-12-18 DIAGNOSIS — R06.02 SHORTNESS OF BREATH: ICD-10-CM

## 2017-12-18 DIAGNOSIS — N31.8 HYPERTONICITY OF BLADDER: ICD-10-CM

## 2017-12-18 DIAGNOSIS — Z00.00 PREVENTATIVE HEALTH CARE: ICD-10-CM

## 2017-12-18 DIAGNOSIS — R27.0 ATAXIA: ICD-10-CM

## 2017-12-18 PROBLEM — H10.10 ALLERGIC CONJUNCTIVITIS: Status: ACTIVE | Noted: 2017-12-18

## 2017-12-18 PROBLEM — R79.89 ELEVATED SERUM CREATININE: Status: RESOLVED | Noted: 2017-05-15 | Resolved: 2017-12-18

## 2017-12-18 LAB
25(OH)D3 SERPL-MCNC: 9.9 NG/ML
ALBUMIN SERPL-MCNC: 4 G/DL (ref 3.2–4.8)
ALBUMIN/GLOB SERPL: 1.5 G/DL (ref 1.5–2.5)
ALP SERPL-CCNC: 56 U/L (ref 25–100)
ALT SERPL W P-5'-P-CCNC: 17 U/L (ref 7–40)
ANION GAP SERPL CALCULATED.3IONS-SCNC: 10 MMOL/L (ref 3–11)
AST SERPL-CCNC: 32 U/L (ref 0–33)
BASOPHILS # BLD AUTO: 0.04 10*3/MM3 (ref 0–0.2)
BASOPHILS NFR BLD AUTO: 0.7 % (ref 0–1)
BILIRUB SERPL-MCNC: 0.7 MG/DL (ref 0.3–1.2)
BUN BLD-MCNC: 9 MG/DL (ref 9–23)
BUN/CREAT SERPL: 30 (ref 7–25)
CALCIUM SPEC-SCNC: 9 MG/DL (ref 8.7–10.4)
CHLORIDE SERPL-SCNC: 99 MMOL/L (ref 99–109)
CO2 SERPL-SCNC: 25 MMOL/L (ref 20–31)
CREAT BLD-MCNC: 0.3 MG/DL (ref 0.6–1.3)
CRP SERPL-MCNC: 0.4 MG/DL (ref 0–1)
DEPRECATED RDW RBC AUTO: 53.6 FL (ref 37–54)
EOSINOPHIL # BLD AUTO: 0.15 10*3/MM3 (ref 0–0.3)
EOSINOPHIL NFR BLD AUTO: 2.7 % (ref 0–3)
ERYTHROCYTE [DISTWIDTH] IN BLOOD BY AUTOMATED COUNT: 14.5 % (ref 11.3–14.5)
GFR SERPL CREATININE-BSD FRML MDRD: >150 ML/MIN/1.73
GLOBULIN UR ELPH-MCNC: 2.6 GM/DL
GLUCOSE BLD-MCNC: 81 MG/DL (ref 70–100)
HCT VFR BLD AUTO: 36.4 % (ref 38.9–50.9)
HGB BLD-MCNC: 12.1 G/DL (ref 13.1–17.5)
IMM GRANULOCYTES # BLD: 0 10*3/MM3 (ref 0–0.03)
IMM GRANULOCYTES NFR BLD: 0 % (ref 0–0.6)
LYMPHOCYTES # BLD AUTO: 1.52 10*3/MM3 (ref 0.6–4.8)
LYMPHOCYTES NFR BLD AUTO: 27.3 % (ref 24–44)
MCH RBC QN AUTO: 33.5 PG (ref 27–31)
MCHC RBC AUTO-ENTMCNC: 33.2 G/DL (ref 32–36)
MCV RBC AUTO: 100.8 FL (ref 80–99)
MONOCYTES # BLD AUTO: 0.79 10*3/MM3 (ref 0–1)
MONOCYTES NFR BLD AUTO: 14.2 % (ref 0–12)
NEUTROPHILS # BLD AUTO: 3.06 10*3/MM3 (ref 1.5–8.3)
NEUTROPHILS NFR BLD AUTO: 55.1 % (ref 41–71)
PLATELET # BLD AUTO: 209 10*3/MM3 (ref 150–450)
PMV BLD AUTO: 9.7 FL (ref 6–12)
POTASSIUM BLD-SCNC: 4.3 MMOL/L (ref 3.5–5.5)
PROT SERPL-MCNC: 6.6 G/DL (ref 5.7–8.2)
PSA SERPL-MCNC: 1.38 NG/ML (ref 0–4)
RBC # BLD AUTO: 3.61 10*6/MM3 (ref 4.2–5.76)
SODIUM BLD-SCNC: 134 MMOL/L (ref 132–146)
TSH SERPL DL<=0.05 MIU/L-ACNC: 1.25 MIU/ML (ref 0.35–5.35)
VIT B12 BLD-MCNC: 497 PG/ML (ref 211–911)
WBC NRBC COR # BLD: 5.56 10*3/MM3 (ref 3.5–10.8)

## 2017-12-18 PROCEDURE — 84443 ASSAY THYROID STIM HORMONE: CPT | Performed by: INTERNAL MEDICINE

## 2017-12-18 PROCEDURE — 80053 COMPREHEN METABOLIC PANEL: CPT | Performed by: INTERNAL MEDICINE

## 2017-12-18 PROCEDURE — 84153 ASSAY OF PSA TOTAL: CPT | Performed by: INTERNAL MEDICINE

## 2017-12-18 PROCEDURE — 82607 VITAMIN B-12: CPT | Performed by: INTERNAL MEDICINE

## 2017-12-18 PROCEDURE — 85025 COMPLETE CBC W/AUTO DIFF WBC: CPT | Performed by: INTERNAL MEDICINE

## 2017-12-18 PROCEDURE — 82306 VITAMIN D 25 HYDROXY: CPT | Performed by: INTERNAL MEDICINE

## 2017-12-18 PROCEDURE — 99215 OFFICE O/P EST HI 40 MIN: CPT | Performed by: INTERNAL MEDICINE

## 2017-12-18 PROCEDURE — 86140 C-REACTIVE PROTEIN: CPT | Performed by: INTERNAL MEDICINE

## 2017-12-18 PROCEDURE — 93000 ELECTROCARDIOGRAM COMPLETE: CPT | Performed by: INTERNAL MEDICINE

## 2017-12-18 PROCEDURE — 36415 COLL VENOUS BLD VENIPUNCTURE: CPT | Performed by: INTERNAL MEDICINE

## 2017-12-18 RX ORDER — LORATADINE 10 MG/1
1 TABLET ORAL DAILY PRN
COMMUNITY

## 2017-12-18 RX ORDER — KETOTIFEN FUMARATE 0.35 MG/ML
1 SOLUTION/ DROPS OPHTHALMIC 2 TIMES DAILY PRN
COMMUNITY
End: 2020-12-11

## 2017-12-18 NOTE — PATIENT INSTRUCTIONS
1.  Continue usual medicines and supplements - as listed.    2.  Regular visits to urologist - to assess bladder function.    3.  Follow well-balanced diet - low in salt low in sugar.    4.  The nurse will call - with test results.    5.  Return visit 6 months - nonfasting checkup.
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
## 2017-12-20 ENCOUNTER — TELEPHONE (OUTPATIENT)
Dept: INTERNAL MEDICINE | Facility: CLINIC | Age: 57
End: 2017-12-20

## 2017-12-20 NOTE — TELEPHONE ENCOUNTER
Left message re: labs.  Per TGF -  Blood count shows mild anemia unchanged from previous years.  Vitamin D level was severely low at 10.  Continue vitamin D3 5000 units daily for lifetime.  Other laboratory tests are acceptable and require no change in treatment.  Enc to call if questions or concerns.

## 2018-06-18 ENCOUNTER — OFFICE VISIT (OUTPATIENT)
Dept: INTERNAL MEDICINE | Facility: CLINIC | Age: 58
End: 2018-06-18

## 2018-06-18 VITALS
HEIGHT: 72 IN | TEMPERATURE: 98.1 F | DIASTOLIC BLOOD PRESSURE: 74 MMHG | HEART RATE: 61 BPM | SYSTOLIC BLOOD PRESSURE: 124 MMHG | OXYGEN SATURATION: 96 %

## 2018-06-18 DIAGNOSIS — J30.2 CHRONIC SEASONAL ALLERGIC RHINITIS, UNSPECIFIED TRIGGER: Primary | ICD-10-CM

## 2018-06-18 PROCEDURE — 99214 OFFICE O/P EST MOD 30 MIN: CPT | Performed by: FAMILY MEDICINE
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
## 2018-12-13 ENCOUNTER — TELEPHONE (OUTPATIENT)
Dept: FAMILY MEDICINE CLINIC | Facility: CLINIC | Age: 58
End: 2018-12-13

## 2018-12-13 NOTE — TELEPHONE ENCOUNTER
PATIENT NEEDS A ROHO CUSHION 8 BY 9 ROWS BUBBLES, HIGH PROFILE. #[id number]    FAX: [phone] SEND TO KAYLIE TORRES ([employer])    PLEASE CALL IF ANY QUESTIONS [phone]

## 2019-01-31 ENCOUNTER — APPOINTMENT (OUTPATIENT)
Dept: LAB | Facility: HOSPITAL | Age: 59
End: 2019-01-31

## 2019-01-31 ENCOUNTER — OFFICE VISIT (OUTPATIENT)
Dept: FAMILY MEDICINE CLINIC | Facility: CLINIC | Age: 59
End: 2019-01-31

## 2019-01-31 VITALS
HEIGHT: 72 IN | BODY MASS INDEX: 24.07 KG/M2 | DIASTOLIC BLOOD PRESSURE: 90 MMHG | SYSTOLIC BLOOD PRESSURE: 136 MMHG | OXYGEN SATURATION: 95 % | HEART RATE: 83 BPM

## 2019-01-31 DIAGNOSIS — Z13.29 SCREENING FOR ENDOCRINE DISORDER: ICD-10-CM

## 2019-01-31 DIAGNOSIS — E55.9 VITAMIN D DEFICIENCY: ICD-10-CM

## 2019-01-31 DIAGNOSIS — T83.511A URINARY TRACT INFECTION ASSOCIATED WITH INDWELLING URETHRAL CATHETER, INITIAL ENCOUNTER (HCC): Primary | ICD-10-CM

## 2019-01-31 DIAGNOSIS — E53.8 COBALAMIN DEFICIENCY: ICD-10-CM

## 2019-01-31 DIAGNOSIS — Z00.00 PREVENTATIVE HEALTH CARE: ICD-10-CM

## 2019-01-31 DIAGNOSIS — L89.312 PRESSURE INJURY OF RIGHT BUTTOCK, STAGE 2 (HCC): ICD-10-CM

## 2019-01-31 DIAGNOSIS — N39.0 URINARY TRACT INFECTION ASSOCIATED WITH INDWELLING URETHRAL CATHETER, INITIAL ENCOUNTER (HCC): Primary | ICD-10-CM

## 2019-01-31 DIAGNOSIS — Z13.220 SCREENING FOR HYPERLIPIDEMIA: ICD-10-CM

## 2019-01-31 DIAGNOSIS — G82.20 PARAPLEGIA (HCC): ICD-10-CM

## 2019-01-31 DIAGNOSIS — Z12.5 SCREENING PSA (PROSTATE SPECIFIC ANTIGEN): ICD-10-CM

## 2019-01-31 LAB
25(OH)D3 SERPL-MCNC: 13.3 NG/ML
ALBUMIN SERPL-MCNC: 3.5 G/DL (ref 3.2–4.8)
ALBUMIN/GLOB SERPL: 1.6 G/DL (ref 1.5–2.5)
ALP SERPL-CCNC: 60 U/L (ref 25–100)
ALT SERPL W P-5'-P-CCNC: 18 U/L (ref 7–40)
ANION GAP SERPL CALCULATED.3IONS-SCNC: 10 MMOL/L (ref 3–11)
ARTICHOKE IGE QN: 19 MG/DL (ref 0–130)
AST SERPL-CCNC: 16 U/L (ref 0–33)
BASOPHILS # BLD AUTO: 0.02 10*3/MM3 (ref 0–0.2)
BASOPHILS NFR BLD AUTO: 0.1 % (ref 0–1)
BILIRUB BLD-MCNC: NEGATIVE MG/DL
BILIRUB SERPL-MCNC: 0.4 MG/DL (ref 0.3–1.2)
BUN BLD-MCNC: 43 MG/DL (ref 9–23)
BUN/CREAT SERPL: 25.4 (ref 7–25)
CALCIUM SPEC-SCNC: 8.2 MG/DL (ref 8.7–10.4)
CHLORIDE SERPL-SCNC: 90 MMOL/L (ref 99–109)
CHOLEST SERPL-MCNC: 75 MG/DL (ref 0–200)
CLARITY, POC: CLEAR
CO2 SERPL-SCNC: 21 MMOL/L (ref 20–31)
COLOR UR: YELLOW
CREAT BLD-MCNC: 1.69 MG/DL (ref 0.6–1.3)
CRP SERPL-MCNC: 20.91 MG/DL (ref 0–1)
DEPRECATED RDW RBC AUTO: 46.6 FL (ref 37–54)
EOSINOPHIL # BLD AUTO: 0.01 10*3/MM3 (ref 0–0.3)
EOSINOPHIL NFR BLD AUTO: 0 % (ref 0–3)
ERYTHROCYTE [DISTWIDTH] IN BLOOD BY AUTOMATED COUNT: 13.4 % (ref 11.3–14.5)
GFR SERPL CREATININE-BSD FRML MDRD: 42 ML/MIN/1.73
GLOBULIN UR ELPH-MCNC: 2.2 GM/DL
GLUCOSE BLD-MCNC: 124 MG/DL (ref 70–100)
GLUCOSE UR STRIP-MCNC: NEGATIVE MG/DL
HBA1C MFR BLD: 4.9 % (ref 4.8–5.6)
HCT VFR BLD AUTO: 31.7 % (ref 38.9–50.9)
HDLC SERPL-MCNC: 41 MG/DL (ref 40–60)
HGB BLD-MCNC: 10.7 G/DL (ref 13.1–17.5)
IMM GRANULOCYTES # BLD AUTO: 0.08 10*3/MM3 (ref 0–0.03)
IMM GRANULOCYTES NFR BLD AUTO: 0.4 % (ref 0–0.6)
KETONES UR QL: NEGATIVE
LEUKOCYTE EST, POC: NEGATIVE
LYMPHOCYTES # BLD AUTO: 0.77 10*3/MM3 (ref 0.6–4.8)
LYMPHOCYTES NFR BLD AUTO: 3.4 % (ref 24–44)
MCH RBC QN AUTO: 32.3 PG (ref 27–31)
MCHC RBC AUTO-ENTMCNC: 33.8 G/DL (ref 32–36)
MCV RBC AUTO: 95.8 FL (ref 80–99)
MONOCYTES # BLD AUTO: 1.59 10*3/MM3 (ref 0–1)
MONOCYTES NFR BLD AUTO: 7.1 % (ref 0–12)
NEUTROPHILS # BLD AUTO: 20.09 10*3/MM3 (ref 1.5–8.3)
NEUTROPHILS NFR BLD AUTO: 89.4 % (ref 41–71)
NITRITE UR-MCNC: NEGATIVE MG/ML
PH UR: 6 [PH] (ref 5–8)
PLATELET # BLD AUTO: 370 10*3/MM3 (ref 150–450)
PMV BLD AUTO: 9.2 FL (ref 6–12)
POTASSIUM BLD-SCNC: 4.4 MMOL/L (ref 3.5–5.5)
PROT SERPL-MCNC: 5.7 G/DL (ref 5.7–8.2)
PROT UR STRIP-MCNC: ABNORMAL MG/DL
PSA SERPL-MCNC: 1.1 NG/ML (ref 0–4)
RBC # BLD AUTO: 3.31 10*6/MM3 (ref 4.2–5.76)
RBC # UR STRIP: ABNORMAL /UL
SODIUM BLD-SCNC: 121 MMOL/L (ref 132–146)
SP GR UR: 1.01 (ref 1–1.03)
TRIGL SERPL-MCNC: 38 MG/DL (ref 0–150)
TSH SERPL DL<=0.05 MIU/L-ACNC: 0.57 MIU/ML (ref 0.35–5.35)
UROBILINOGEN UR QL: NORMAL
VIT B12 BLD-MCNC: 1175 PG/ML (ref 211–911)
WBC NRBC COR # BLD: 22.48 10*3/MM3 (ref 3.5–10.8)

## 2019-01-31 PROCEDURE — 80053 COMPREHEN METABOLIC PANEL: CPT | Performed by: FAMILY MEDICINE

## 2019-01-31 PROCEDURE — 99214 OFFICE O/P EST MOD 30 MIN: CPT | Performed by: FAMILY MEDICINE

## 2019-01-31 PROCEDURE — 80061 LIPID PANEL: CPT | Performed by: FAMILY MEDICINE

## 2019-01-31 PROCEDURE — G0103 PSA SCREENING: HCPCS | Performed by: FAMILY MEDICINE

## 2019-01-31 PROCEDURE — 87086 URINE CULTURE/COLONY COUNT: CPT | Performed by: FAMILY MEDICINE

## 2019-01-31 PROCEDURE — 82607 VITAMIN B-12: CPT | Performed by: FAMILY MEDICINE

## 2019-01-31 PROCEDURE — 84443 ASSAY THYROID STIM HORMONE: CPT | Performed by: FAMILY MEDICINE

## 2019-01-31 PROCEDURE — 82306 VITAMIN D 25 HYDROXY: CPT | Performed by: FAMILY MEDICINE

## 2019-01-31 PROCEDURE — 81003 URINALYSIS AUTO W/O SCOPE: CPT | Performed by: FAMILY MEDICINE

## 2019-01-31 PROCEDURE — 86140 C-REACTIVE PROTEIN: CPT | Performed by: FAMILY MEDICINE

## 2019-01-31 PROCEDURE — 36415 COLL VENOUS BLD VENIPUNCTURE: CPT | Performed by: FAMILY MEDICINE

## 2019-01-31 PROCEDURE — 83036 HEMOGLOBIN GLYCOSYLATED A1C: CPT | Performed by: FAMILY MEDICINE

## 2019-01-31 PROCEDURE — 87077 CULTURE AEROBIC IDENTIFY: CPT | Performed by: FAMILY MEDICINE

## 2019-01-31 PROCEDURE — 85025 COMPLETE CBC W/AUTO DIFF WBC: CPT | Performed by: FAMILY MEDICINE

## 2019-01-31 PROCEDURE — 87186 SC STD MICRODIL/AGAR DIL: CPT | Performed by: FAMILY MEDICINE

## 2019-02-01 ENCOUNTER — TELEPHONE (OUTPATIENT)
Dept: FAMILY MEDICINE CLINIC | Facility: CLINIC | Age: 59
End: 2019-02-01

## 2019-02-01 RX ORDER — LEVOFLOXACIN 750 MG/1
750 TABLET ORAL DAILY
Qty: 7 TABLET | Refills: 0 | Status: SHIPPED | OUTPATIENT
Start: 2019-02-01 | End: 2019-02-08

## 2019-02-01 NOTE — TELEPHONE ENCOUNTER
Former[facility] CALLED REGARDING PTS REFERRAL WOUND CARE. BUT THEY CAN NOT ADMIT HIM BECAUSE HE IS NOT HOME BOUND AND IS CURRENTLY WORKING. AND HAS MEDICARE.   THEY RECOMMEND AN OUT PATIENT WOUND CLINIC.     HE HAS A CONTACT FROM [facility].  PT WILL SET UP APPT HIMSELF.
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
## 2019-02-02 ENCOUNTER — APPOINTMENT (OUTPATIENT)
Dept: CT IMAGING | Facility: HOSPITAL | Age: 59
End: 2019-02-02

## 2019-02-02 ENCOUNTER — HOSPITAL ENCOUNTER (INPATIENT)
Facility: HOSPITAL | Age: 59
LOS: 3 days | Discharge: ANOTHER HOSPITAL | End: 2019-02-05
Attending: EMERGENCY MEDICINE | Admitting: INTERNAL MEDICINE

## 2019-02-02 DIAGNOSIS — G82.50 QUADRIPLEGIA (HCC): ICD-10-CM

## 2019-02-02 DIAGNOSIS — D72.829 LEUKOCYTOSIS, UNSPECIFIED TYPE: ICD-10-CM

## 2019-02-02 DIAGNOSIS — L03.317 CELLULITIS OF BUTTOCK, RIGHT: ICD-10-CM

## 2019-02-02 DIAGNOSIS — L89.314 PRESSURE INJURY OF RIGHT BUTTOCK, STAGE 4 (HCC): Primary | ICD-10-CM

## 2019-02-02 PROBLEM — L03.90 CELLULITIS: Status: ACTIVE | Noted: 2019-02-02

## 2019-02-02 PROBLEM — L98.429 SACRAL ULCER (HCC): Status: ACTIVE | Noted: 2019-02-02

## 2019-02-02 PROBLEM — L89.159 DECUBITUS ULCER OF SACRAL AREA: Status: ACTIVE | Noted: 2019-02-02

## 2019-02-02 PROBLEM — L02.31 CELLULITIS AND ABSCESS OF BUTTOCK: Status: ACTIVE | Noted: 2019-02-02

## 2019-02-02 PROBLEM — G90.1 DYSAUTONOMIA (HCC): Status: ACTIVE | Noted: 2019-02-02

## 2019-02-02 PROBLEM — E87.1 HYPONATREMIA: Status: ACTIVE | Noted: 2019-02-02

## 2019-02-02 LAB
ALBUMIN SERPL-MCNC: 3.5 G/DL (ref 3.2–4.8)
ALBUMIN/GLOB SERPL: 1.4 G/DL (ref 1.5–2.5)
ALP SERPL-CCNC: 59 U/L (ref 25–100)
ALT SERPL W P-5'-P-CCNC: 18 U/L (ref 7–40)
ANION GAP SERPL CALCULATED.3IONS-SCNC: 9 MMOL/L (ref 3–11)
AST SERPL-CCNC: 21 U/L (ref 0–33)
BACTERIA SPEC AEROBE CULT: ABNORMAL
BACTERIA UR QL AUTO: ABNORMAL /HPF
BASOPHILS # BLD AUTO: 0.02 10*3/MM3 (ref 0–0.2)
BASOPHILS NFR BLD AUTO: 0.1 % (ref 0–1)
BILIRUB SERPL-MCNC: 0.5 MG/DL (ref 0.3–1.2)
BILIRUB UR QL STRIP: NEGATIVE
BUN BLD-MCNC: 38 MG/DL (ref 9–23)
BUN/CREAT SERPL: 27.3 (ref 7–25)
CALCIUM SPEC-SCNC: 8.6 MG/DL (ref 8.7–10.4)
CHLORIDE SERPL-SCNC: 90 MMOL/L (ref 99–109)
CLARITY UR: CLEAR
CO2 SERPL-SCNC: 22 MMOL/L (ref 20–31)
COLOR UR: YELLOW
CREAT BLD-MCNC: 1.39 MG/DL (ref 0.6–1.3)
DEPRECATED RDW RBC AUTO: 45.2 FL (ref 37–54)
EOSINOPHIL # BLD AUTO: 0.04 10*3/MM3 (ref 0–0.3)
EOSINOPHIL NFR BLD AUTO: 0.3 % (ref 0–3)
ERYTHROCYTE [DISTWIDTH] IN BLOOD BY AUTOMATED COUNT: 13 % (ref 11.3–14.5)
GFR SERPL CREATININE-BSD FRML MDRD: 52 ML/MIN/1.73
GLOBULIN UR ELPH-MCNC: 2.5 GM/DL
GLUCOSE BLD-MCNC: 108 MG/DL (ref 70–100)
GLUCOSE UR STRIP-MCNC: NEGATIVE MG/DL
HCT VFR BLD AUTO: 30.2 % (ref 38.9–50.9)
HGB BLD-MCNC: 10.4 G/DL (ref 13.1–17.5)
HGB UR QL STRIP.AUTO: ABNORMAL
HYALINE CASTS UR QL AUTO: ABNORMAL /LPF
IMM GRANULOCYTES # BLD AUTO: 0.15 10*3/MM3 (ref 0–0.03)
IMM GRANULOCYTES NFR BLD AUTO: 0.9 % (ref 0–0.6)
KETONES UR QL STRIP: NEGATIVE
LEUKOCYTE ESTERASE UR QL STRIP.AUTO: ABNORMAL
LYMPHOCYTES # BLD AUTO: 0.68 10*3/MM3 (ref 0.6–4.8)
LYMPHOCYTES NFR BLD AUTO: 4.3 % (ref 24–44)
MCH RBC QN AUTO: 32.4 PG (ref 27–31)
MCHC RBC AUTO-ENTMCNC: 34.4 G/DL (ref 32–36)
MCV RBC AUTO: 94.1 FL (ref 80–99)
MONOCYTES # BLD AUTO: 2.34 10*3/MM3 (ref 0–1)
MONOCYTES NFR BLD AUTO: 14.8 % (ref 0–12)
NEUTROPHILS # BLD AUTO: 12.63 10*3/MM3 (ref 1.5–8.3)
NEUTROPHILS NFR BLD AUTO: 79.6 % (ref 41–71)
NITRITE UR QL STRIP: NEGATIVE
PH UR STRIP.AUTO: 6 [PH] (ref 5–8)
PLATELET # BLD AUTO: 284 10*3/MM3 (ref 150–450)
PMV BLD AUTO: 8.3 FL (ref 6–12)
POTASSIUM BLD-SCNC: 3.9 MMOL/L (ref 3.5–5.5)
PROT SERPL-MCNC: 6 G/DL (ref 5.7–8.2)
PROT UR QL STRIP: NEGATIVE
RBC # BLD AUTO: 3.21 10*6/MM3 (ref 4.2–5.76)
RBC # UR: ABNORMAL /HPF
REF LAB TEST METHOD: ABNORMAL
SODIUM BLD-SCNC: 121 MMOL/L (ref 132–146)
SODIUM BLD-SCNC: 122 MMOL/L (ref 132–146)
SP GR UR STRIP: 1.01 (ref 1–1.03)
SQUAMOUS #/AREA URNS HPF: ABNORMAL /HPF
UROBILINOGEN UR QL STRIP: ABNORMAL
WBC NRBC COR # BLD: 15.86 10*3/MM3 (ref 3.5–10.8)
WBC UR QL AUTO: ABNORMAL /HPF

## 2019-02-02 PROCEDURE — 85025 COMPLETE CBC W/AUTO DIFF WBC: CPT | Performed by: NURSE PRACTITIONER

## 2019-02-02 PROCEDURE — 87205 SMEAR GRAM STAIN: CPT | Performed by: NURSE PRACTITIONER

## 2019-02-02 PROCEDURE — 87070 CULTURE OTHR SPECIMN AEROBIC: CPT | Performed by: NURSE PRACTITIONER

## 2019-02-02 PROCEDURE — 25010000002 CEFTRIAXONE PER 250 MG: Performed by: NURSE PRACTITIONER

## 2019-02-02 PROCEDURE — 25010000002 PIPERACILLIN SOD-TAZOBACTAM PER 1 G: Performed by: INTERNAL MEDICINE

## 2019-02-02 PROCEDURE — 81001 URINALYSIS AUTO W/SCOPE: CPT | Performed by: NURSE PRACTITIONER

## 2019-02-02 PROCEDURE — 87086 URINE CULTURE/COLONY COUNT: CPT | Performed by: NURSE PRACTITIONER

## 2019-02-02 PROCEDURE — 25010000002 VANCOMYCIN 10 G RECONSTITUTED SOLUTION

## 2019-02-02 PROCEDURE — 87040 BLOOD CULTURE FOR BACTERIA: CPT | Performed by: NURSE PRACTITIONER

## 2019-02-02 PROCEDURE — 87077 CULTURE AEROBIC IDENTIFY: CPT | Performed by: NURSE PRACTITIONER

## 2019-02-02 PROCEDURE — 25010000002 IOPAMIDOL 61 % SOLUTION: Performed by: EMERGENCY MEDICINE

## 2019-02-02 PROCEDURE — 99284 EMERGENCY DEPT VISIT MOD MDM: CPT

## 2019-02-02 PROCEDURE — 84295 ASSAY OF SERUM SODIUM: CPT | Performed by: INTERNAL MEDICINE

## 2019-02-02 PROCEDURE — 80053 COMPREHEN METABOLIC PANEL: CPT | Performed by: NURSE PRACTITIONER

## 2019-02-02 PROCEDURE — 99223 1ST HOSP IP/OBS HIGH 75: CPT | Performed by: INTERNAL MEDICINE

## 2019-02-02 PROCEDURE — 25010000002 HEPARIN (PORCINE) PER 1000 UNITS: Performed by: INTERNAL MEDICINE

## 2019-02-02 PROCEDURE — 87186 SC STD MICRODIL/AGAR DIL: CPT | Performed by: NURSE PRACTITIONER

## 2019-02-02 PROCEDURE — 72193 CT PELVIS W/DYE: CPT

## 2019-02-02 PROCEDURE — 73701 CT LOWER EXTREMITY W/DYE: CPT

## 2019-02-02 RX ORDER — SODIUM CHLORIDE 0.9 % (FLUSH) 0.9 %
3 SYRINGE (ML) INJECTION EVERY 12 HOURS SCHEDULED
Status: DISCONTINUED | OUTPATIENT
Start: 2019-02-02 | End: 2019-02-06 | Stop reason: HOSPADM

## 2019-02-02 RX ORDER — OXYBUTYNIN CHLORIDE 5 MG/1
5 TABLET ORAL 3 TIMES DAILY
Status: DISCONTINUED | OUTPATIENT
Start: 2019-02-02 | End: 2019-02-06 | Stop reason: HOSPADM

## 2019-02-02 RX ORDER — ONDANSETRON 2 MG/ML
4 INJECTION INTRAMUSCULAR; INTRAVENOUS EVERY 6 HOURS PRN
Status: DISCONTINUED | OUTPATIENT
Start: 2019-02-02 | End: 2019-02-06 | Stop reason: HOSPADM

## 2019-02-02 RX ORDER — CEFTRIAXONE SODIUM 1 G/50ML
1 INJECTION, SOLUTION INTRAVENOUS ONCE
Status: COMPLETED | OUTPATIENT
Start: 2019-02-02 | End: 2019-02-02

## 2019-02-02 RX ORDER — POTASSIUM CHLORIDE 7.45 MG/ML
10 INJECTION INTRAVENOUS
Status: DISCONTINUED | OUTPATIENT
Start: 2019-02-02 | End: 2019-02-06 | Stop reason: HOSPADM

## 2019-02-02 RX ORDER — MELATONIN
2000 DAILY
Status: DISCONTINUED | OUTPATIENT
Start: 2019-02-02 | End: 2019-02-06 | Stop reason: HOSPADM

## 2019-02-02 RX ORDER — POTASSIUM CHLORIDE 750 MG/1
40 CAPSULE, EXTENDED RELEASE ORAL AS NEEDED
Status: DISCONTINUED | OUTPATIENT
Start: 2019-02-02 | End: 2019-02-06 | Stop reason: HOSPADM

## 2019-02-02 RX ORDER — MAGNESIUM SULFATE HEPTAHYDRATE 40 MG/ML
4 INJECTION, SOLUTION INTRAVENOUS AS NEEDED
Status: DISCONTINUED | OUTPATIENT
Start: 2019-02-02 | End: 2019-02-06 | Stop reason: HOSPADM

## 2019-02-02 RX ORDER — ACETAMINOPHEN 160 MG
2000 TABLET,DISINTEGRATING ORAL DAILY
Status: DISCONTINUED | OUTPATIENT
Start: 2019-02-02 | End: 2019-02-02 | Stop reason: SDUPTHER

## 2019-02-02 RX ORDER — ACETAMINOPHEN 325 MG/1
650 TABLET ORAL EVERY 4 HOURS PRN
Status: DISCONTINUED | OUTPATIENT
Start: 2019-02-02 | End: 2019-02-06 | Stop reason: HOSPADM

## 2019-02-02 RX ORDER — ONDANSETRON 4 MG/1
4 TABLET, FILM COATED ORAL EVERY 6 HOURS PRN
Status: DISCONTINUED | OUTPATIENT
Start: 2019-02-02 | End: 2019-02-06 | Stop reason: HOSPADM

## 2019-02-02 RX ORDER — SENNA AND DOCUSATE SODIUM 50; 8.6 MG/1; MG/1
2 TABLET, FILM COATED ORAL NIGHTLY
Status: DISCONTINUED | OUTPATIENT
Start: 2019-02-02 | End: 2019-02-06 | Stop reason: HOSPADM

## 2019-02-02 RX ORDER — HEPARIN SODIUM 5000 [USP'U]/ML
5000 INJECTION, SOLUTION INTRAVENOUS; SUBCUTANEOUS EVERY 8 HOURS SCHEDULED
Status: DISCONTINUED | OUTPATIENT
Start: 2019-02-02 | End: 2019-02-06 | Stop reason: HOSPADM

## 2019-02-02 RX ORDER — POTASSIUM CHLORIDE 1.5 G/1.77G
40 POWDER, FOR SOLUTION ORAL AS NEEDED
Status: DISCONTINUED | OUTPATIENT
Start: 2019-02-02 | End: 2019-02-06 | Stop reason: HOSPADM

## 2019-02-02 RX ORDER — LANOLIN ALCOHOL/MO/W.PET/CERES
1000 CREAM (GRAM) TOPICAL DAILY
Status: DISCONTINUED | OUTPATIENT
Start: 2019-02-02 | End: 2019-02-06 | Stop reason: HOSPADM

## 2019-02-02 RX ORDER — KETOTIFEN FUMARATE 0.35 MG/ML
1 SOLUTION/ DROPS OPHTHALMIC 2 TIMES DAILY
Status: DISCONTINUED | OUTPATIENT
Start: 2019-02-02 | End: 2019-02-06 | Stop reason: HOSPADM

## 2019-02-02 RX ORDER — SODIUM CHLORIDE 0.9 % (FLUSH) 0.9 %
10 SYRINGE (ML) INJECTION AS NEEDED
Status: DISCONTINUED | OUTPATIENT
Start: 2019-02-02 | End: 2019-02-06 | Stop reason: HOSPADM

## 2019-02-02 RX ORDER — SODIUM CHLORIDE 9 MG/ML
100 INJECTION, SOLUTION INTRAVENOUS CONTINUOUS
Status: DISCONTINUED | OUTPATIENT
Start: 2019-02-02 | End: 2019-02-06 | Stop reason: HOSPADM

## 2019-02-02 RX ORDER — MAGNESIUM SULFATE HEPTAHYDRATE 40 MG/ML
2 INJECTION, SOLUTION INTRAVENOUS AS NEEDED
Status: DISCONTINUED | OUTPATIENT
Start: 2019-02-02 | End: 2019-02-06 | Stop reason: HOSPADM

## 2019-02-02 RX ORDER — SODIUM CHLORIDE 0.9 % (FLUSH) 0.9 %
3-10 SYRINGE (ML) INJECTION AS NEEDED
Status: DISCONTINUED | OUTPATIENT
Start: 2019-02-02 | End: 2019-02-06 | Stop reason: HOSPADM

## 2019-02-02 RX ORDER — MULTIPLE VITAMINS W/ MINERALS TAB 9MG-400MCG
1 TAB ORAL DAILY
Status: DISCONTINUED | OUTPATIENT
Start: 2019-02-02 | End: 2019-02-06 | Stop reason: HOSPADM

## 2019-02-02 RX ADMIN — HEPARIN SODIUM 5000 UNITS: 5000 INJECTION INTRAVENOUS; SUBCUTANEOUS at 15:58

## 2019-02-02 RX ADMIN — TAZOBACTAM SODIUM AND PIPERACILLIN SODIUM 3.38 G: 375; 3 INJECTION, SOLUTION INTRAVENOUS at 20:39

## 2019-02-02 RX ADMIN — KETOTIFEN FUMARATE 1 DROP: 0.35 SOLUTION/ DROPS OPHTHALMIC at 22:50

## 2019-02-02 RX ADMIN — VANCOMYCIN HYDROCHLORIDE 2000 MG: 10 INJECTION, POWDER, LYOPHILIZED, FOR SOLUTION INTRAVENOUS at 16:59

## 2019-02-02 RX ADMIN — IOPAMIDOL 95 ML: 612 INJECTION, SOLUTION INTRAVENOUS at 14:03

## 2019-02-02 RX ADMIN — OXYBUTYNIN CHLORIDE 5 MG: 5 TABLET ORAL at 20:39

## 2019-02-02 RX ADMIN — SENNOSIDES AND DOCUSATE SODIUM 2 TABLET: 8.6; 5 TABLET ORAL at 20:39

## 2019-02-02 RX ADMIN — CYANOCOBALAMIN TAB 1000 MCG 1000 MCG: 1000 TAB at 15:59

## 2019-02-02 RX ADMIN — SODIUM CHLORIDE 100 ML/HR: 9 INJECTION, SOLUTION INTRAVENOUS at 15:55

## 2019-02-02 RX ADMIN — TAZOBACTAM SODIUM AND PIPERACILLIN SODIUM 3.38 G: 375; 3 INJECTION, SOLUTION INTRAVENOUS at 15:55

## 2019-02-02 RX ADMIN — OXYBUTYNIN CHLORIDE 5 MG: 5 TABLET ORAL at 15:59

## 2019-02-02 RX ADMIN — CEFTRIAXONE SODIUM 1 G: 1 INJECTION, SOLUTION INTRAVENOUS at 13:36

## 2019-02-02 RX ADMIN — HEPARIN SODIUM 5000 UNITS: 5000 INJECTION INTRAVENOUS; SUBCUTANEOUS at 20:39

## 2019-02-02 NOTE — PLAN OF CARE
Problem: Patient Care Overview  Goal: Plan of Care Review  Outcome: Ongoing (interventions implemented as appropriate)   02/02/19 1640   Coping/Psychosocial   Plan of Care Reviewed With patient   Plan of Care Review   Progress no change   OTHER   Outcome Summary pt arrived from ED, lu was inserted, wound dressing applied and waiting for wound consult. Antibiotics started. VSS. No compaints of pain, will continue to monitor     Goal: Individualization and Mutuality  Outcome: Ongoing (interventions implemented as appropriate)    Goal: Discharge Needs Assessment  Outcome: Ongoing (interventions implemented as appropriate)    Goal: Interprofessional Rounds/Family Conf  Outcome: Ongoing (interventions implemented as appropriate)      Problem: Fall Risk (Adult)  Goal: Identify Related Risk Factors and Signs and Symptoms  Outcome: Ongoing (interventions implemented as appropriate)    Goal: Absence of Fall  Outcome: Ongoing (interventions implemented as appropriate)      Problem: Skin Injury Risk (Adult)  Goal: Identify Related Risk Factors and Signs and Symptoms  Outcome: Ongoing (interventions implemented as appropriate)    Goal: Skin Health and Integrity  Outcome: Ongoing (interventions implemented as appropriate)
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
## 2019-02-02 NOTE — PLAN OF CARE
Problem: Infection, Risk/Actual (Adult)  Goal: Identify Related Risk Factors and Signs and Symptoms  Outcome: Ongoing (interventions implemented as appropriate)    Goal: Infection Prevention/Resolution  Outcome: Ongoing (interventions implemented as appropriate)
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
## 2019-02-02 NOTE — PROGRESS NOTES
"Pharmacy Consult-Vancomycin Dosing  Baudilio Toledo is a  58 y.o. male receiving vancomycin therapy.     Indication: Sepsis / SSTI  Consulting Provider: Hospitalist  ID Consult: Yes    Goal Trough: 15-20 mcg/mL    Current Antimicrobial Therapy  Anti-Infectives (From admission, onward)      Ordered     Dose/Rate Route Frequency Start Stop    02/02/19 1502  piperacillin-tazobactam (ZOSYN) 3.375 g in iso-osmotic dextrose 50 ml (premix)     Ordering Provider:  Roxanna Mehta MD    3.375 g  over 4 Hours Intravenous Every 8 Hours 02/02/19 2200 02/09/19 2159    02/02/19 1502  piperacillin-tazobactam (ZOSYN) 3.375 g in iso-osmotic dextrose 50 ml (premix)     Ordering Provider:  Roxanna Mehta MD    3.375 g  over 30 Minutes Intravenous Once 02/02/19 1600      02/02/19 1502  Pharmacy to dose vancomycin     Ordering Provider:  Roxanna Mehta MD     Does not apply Continuous PRN 02/02/19 1502 02/09/19 1501    02/02/19 1313  cefTRIAXone (ROCEPHIN) IVPB 1 g     Ordering Provider:  Leydi Swift APRN    1 g  100 mL/hr over 30 Minutes Intravenous Once 02/02/19 1315 02/02/19 1430            Allergies  Allergies as of 02/02/2019 - Reviewed 02/02/2019   Allergen Reaction Noted   • Sulfa antibiotics Rash 05/31/2016       Labs    Results from last 7 days   Lab Units 02/02/19  1235 01/31/19  1348   BUN mg/dL 38* 43*   CREATININE mg/dL 1.39* 1.69*       Results from last 7 days   Lab Units 02/02/19  1235 01/31/19  1348   WBC 10*3/mm3 15.86* 22.48*       Evaluation of Dosing  Is Patient on Dialysis or Renal Replacement: No    Ht - 180.3 cm (71\")  Wt - 81.6 kg (180 lb)    Estimated Creatinine Clearance: 65.2 mL/min (A) (by C-G formula based on SCr of 1.39 mg/dL (H)).    Intake & Output (last 3 days)         01/30 0701 - 01/31 0700 01/31 0701 - 02/01 0700 02/01 0701 - 02/02 0700 02/02 0701 - 02/03 0700    Urine (mL/kg/hr)    300    Total Output    300    Net    -300                    Microbiology and Radiology  Microbiology " Results (last 10 days)       Procedure Component Value - Date/Time    Urine Culture - Urine, Urine, Random Void [[id number]]  (Abnormal)  (Susceptibility) Collected:  01/31/19 1548    Lab Status:  Final result Specimen:  Urine, Random Void Updated:  02/02/19 1106     Urine Culture 40,000-50,000 CFU/mL Escherichia coli    Susceptibility        Escherichia coli     MIGUEL     Ampicillin Resistant     Ampicillin + Sulbactam Resistant     Aztreonam Susceptible     Cefepime Susceptible     Cefotaxime Susceptible     Ceftriaxone Susceptible     Cefuroxime sodium Susceptible     Cephalothin Intermediate     Ciprofloxacin Resistant     Ertapenem Susceptible     Gentamicin Susceptible     Levofloxacin Resistant     Meropenem Susceptible     Nitrofurantoin Susceptible     Piperacillin + Tazobactam Susceptible     Tetracycline Susceptible     Tobramycin Susceptible     Trimethoprim + Sulfamethoxazole Susceptible                              Evaluation of Level    No results found for: GINNA LENTZ VANCORANDOM    Assessment/Plan:  1. Patient will receive a loading dose of vancomycin 2000 mg IV once. Patient is paraplegic with no I/Os reported yet. Will wait to schedule a maintenance therapy until a random level is drawn.  2. Vancomycin random ordered for 2/3 @ 1630, 24 hours after loading dose.   3. Monitor renal function, cultures and sensitivities, and clinical status, and adjust regimen as necessary.  4. Pharmacy will continue to follow.    Ivonne Damian, PharmD  Pharmacy Resident  2/2/2019  3:21 PM

## 2019-02-03 ENCOUNTER — APPOINTMENT (OUTPATIENT)
Dept: MRI IMAGING | Facility: HOSPITAL | Age: 59
End: 2019-02-03

## 2019-02-03 LAB
ALBUMIN SERPL-MCNC: 3.16 G/DL (ref 3.2–4.8)
ALBUMIN/GLOB SERPL: 1.5 G/DL (ref 1.5–2.5)
ALP SERPL-CCNC: 50 U/L (ref 25–100)
ALT SERPL W P-5'-P-CCNC: 16 U/L (ref 7–40)
ANION GAP SERPL CALCULATED.3IONS-SCNC: 6 MMOL/L (ref 3–11)
AST SERPL-CCNC: 18 U/L (ref 0–33)
BASOPHILS # BLD AUTO: 0.05 10*3/MM3 (ref 0–0.2)
BASOPHILS NFR BLD AUTO: 0.5 % (ref 0–1)
BILIRUB SERPL-MCNC: 0.3 MG/DL (ref 0.3–1.2)
BUN BLD-MCNC: 29 MG/DL (ref 9–23)
BUN/CREAT SERPL: 24.4 (ref 7–25)
CALCIUM SPEC-SCNC: 8.1 MG/DL (ref 8.7–10.4)
CHLORIDE SERPL-SCNC: 95 MMOL/L (ref 99–109)
CK SERPL-CCNC: 91 U/L (ref 26–174)
CO2 SERPL-SCNC: 23 MMOL/L (ref 20–31)
CREAT BLD-MCNC: 1.19 MG/DL (ref 0.6–1.3)
CREAT UR-MCNC: 14.8 MG/DL
DEPRECATED RDW RBC AUTO: 46.9 FL (ref 37–54)
EOSINOPHIL # BLD AUTO: 0.12 10*3/MM3 (ref 0–0.3)
EOSINOPHIL NFR BLD AUTO: 1.3 % (ref 0–3)
EOSINOPHIL SPEC QL MICRO: 0 % EOS/100 CELLS (ref 0–0)
ERYTHROCYTE [DISTWIDTH] IN BLOOD BY AUTOMATED COUNT: 13.4 % (ref 11.3–14.5)
GFR SERPL CREATININE-BSD FRML MDRD: 62 ML/MIN/1.73
GLOBULIN UR ELPH-MCNC: 2 GM/DL
GLUCOSE BLD-MCNC: 107 MG/DL (ref 70–100)
HCT VFR BLD AUTO: 28.5 % (ref 38.9–50.9)
HGB BLD-MCNC: 9.7 G/DL (ref 13.1–17.5)
IMM GRANULOCYTES # BLD AUTO: 0.14 10*3/MM3 (ref 0–0.03)
IMM GRANULOCYTES NFR BLD AUTO: 1.5 % (ref 0–0.6)
LDH SERPL-CCNC: 138 U/L (ref 120–246)
LYMPHOCYTES # BLD AUTO: 1.04 10*3/MM3 (ref 0.6–4.8)
LYMPHOCYTES NFR BLD AUTO: 11.1 % (ref 24–44)
MCH RBC QN AUTO: 32.3 PG (ref 27–31)
MCHC RBC AUTO-ENTMCNC: 34 G/DL (ref 32–36)
MCV RBC AUTO: 95 FL (ref 80–99)
MONOCYTES # BLD AUTO: 1.81 10*3/MM3 (ref 0–1)
MONOCYTES NFR BLD AUTO: 19.4 % (ref 0–12)
NEUTROPHILS # BLD AUTO: 6.33 10*3/MM3 (ref 1.5–8.3)
NEUTROPHILS NFR BLD AUTO: 67.7 % (ref 41–71)
OSMOLALITY SERPL: 263 MOSM/KG (ref 275–295)
OSMOLALITY UR: 192 MOSM/KG (ref 300–1100)
PLATELET # BLD AUTO: 305 10*3/MM3 (ref 150–450)
PMV BLD AUTO: 8.5 FL (ref 6–12)
POTASSIUM BLD-SCNC: 3.9 MMOL/L (ref 3.5–5.5)
PROT SERPL-MCNC: 5.2 G/DL (ref 5.7–8.2)
RBC # BLD AUTO: 3 10*6/MM3 (ref 4.2–5.76)
SODIUM BLD-SCNC: 122 MMOL/L (ref 132–146)
SODIUM BLD-SCNC: 124 MMOL/L (ref 132–146)
SODIUM BLD-SCNC: 126 MMOL/L (ref 132–146)
SODIUM UR-SCNC: 49 MMOL/L (ref 30–90)
URATE SERPL-MCNC: 5.4 MG/DL (ref 3.7–9.2)
VANCOMYCIN SERPL-MCNC: 10.9 MCG/ML (ref 5–40)
WBC NRBC COR # BLD: 9.35 10*3/MM3 (ref 3.5–10.8)

## 2019-02-03 PROCEDURE — 82550 ASSAY OF CK (CPK): CPT | Performed by: INTERNAL MEDICINE

## 2019-02-03 PROCEDURE — 84295 ASSAY OF SERUM SODIUM: CPT | Performed by: INTERNAL MEDICINE

## 2019-02-03 PROCEDURE — 99233 SBSQ HOSP IP/OBS HIGH 50: CPT | Performed by: INTERNAL MEDICINE

## 2019-02-03 PROCEDURE — 72195 MRI PELVIS W/O DYE: CPT

## 2019-02-03 PROCEDURE — 83935 ASSAY OF URINE OSMOLALITY: CPT | Performed by: INTERNAL MEDICINE

## 2019-02-03 PROCEDURE — 85025 COMPLETE CBC W/AUTO DIFF WBC: CPT | Performed by: INTERNAL MEDICINE

## 2019-02-03 PROCEDURE — 83615 LACTATE (LD) (LDH) ENZYME: CPT | Performed by: INTERNAL MEDICINE

## 2019-02-03 PROCEDURE — 82570 ASSAY OF URINE CREATININE: CPT | Performed by: INTERNAL MEDICINE

## 2019-02-03 PROCEDURE — 25010000002 HEPARIN (PORCINE) PER 1000 UNITS: Performed by: INTERNAL MEDICINE

## 2019-02-03 PROCEDURE — 84300 ASSAY OF URINE SODIUM: CPT | Performed by: INTERNAL MEDICINE

## 2019-02-03 PROCEDURE — 87205 SMEAR GRAM STAIN: CPT | Performed by: INTERNAL MEDICINE

## 2019-02-03 PROCEDURE — 80053 COMPREHEN METABOLIC PANEL: CPT | Performed by: INTERNAL MEDICINE

## 2019-02-03 PROCEDURE — 84550 ASSAY OF BLOOD/URIC ACID: CPT | Performed by: INTERNAL MEDICINE

## 2019-02-03 PROCEDURE — 80202 ASSAY OF VANCOMYCIN: CPT

## 2019-02-03 PROCEDURE — 83930 ASSAY OF BLOOD OSMOLALITY: CPT | Performed by: INTERNAL MEDICINE

## 2019-02-03 PROCEDURE — 25010000002 VANCOMYCIN: Performed by: INTERNAL MEDICINE

## 2019-02-03 PROCEDURE — 25010000002 PIPERACILLIN SOD-TAZOBACTAM PER 1 G: Performed by: INTERNAL MEDICINE

## 2019-02-03 RX ORDER — SODIUM HYPOCHLORITE 2.5 MG/ML
SOLUTION TOPICAL 2 TIMES DAILY
Status: DISCONTINUED | OUTPATIENT
Start: 2019-02-03 | End: 2019-02-03 | Stop reason: SDUPTHER

## 2019-02-03 RX ADMIN — HEPARIN SODIUM 5000 UNITS: 5000 INJECTION INTRAVENOUS; SUBCUTANEOUS at 06:56

## 2019-02-03 RX ADMIN — CYANOCOBALAMIN TAB 1000 MCG 1000 MCG: 1000 TAB at 08:02

## 2019-02-03 RX ADMIN — HEPARIN SODIUM 5000 UNITS: 5000 INJECTION INTRAVENOUS; SUBCUTANEOUS at 21:31

## 2019-02-03 RX ADMIN — SODIUM CHLORIDE, PRESERVATIVE FREE 3 ML: 5 INJECTION INTRAVENOUS at 08:06

## 2019-02-03 RX ADMIN — SODIUM CHLORIDE, PRESERVATIVE FREE 3 ML: 5 INJECTION INTRAVENOUS at 21:32

## 2019-02-03 RX ADMIN — HEPARIN SODIUM 5000 UNITS: 5000 INJECTION INTRAVENOUS; SUBCUTANEOUS at 14:16

## 2019-02-03 RX ADMIN — MULTIPLE VITAMINS W/ MINERALS TAB 1 TABLET: TAB ORAL at 08:02

## 2019-02-03 RX ADMIN — OXYBUTYNIN CHLORIDE 5 MG: 5 TABLET ORAL at 08:02

## 2019-02-03 RX ADMIN — KETOTIFEN FUMARATE 1 DROP: 0.35 SOLUTION/ DROPS OPHTHALMIC at 21:31

## 2019-02-03 RX ADMIN — TAZOBACTAM SODIUM AND PIPERACILLIN SODIUM 3.38 G: 375; 3 INJECTION, SOLUTION INTRAVENOUS at 14:16

## 2019-02-03 RX ADMIN — OXYBUTYNIN CHLORIDE 5 MG: 5 TABLET ORAL at 21:31

## 2019-02-03 RX ADMIN — TAZOBACTAM SODIUM AND PIPERACILLIN SODIUM 3.38 G: 375; 3 INJECTION, SOLUTION INTRAVENOUS at 06:57

## 2019-02-03 RX ADMIN — VANCOMYCIN HYDROCHLORIDE 1250 MG: 10 INJECTION, POWDER, LYOPHILIZED, FOR SOLUTION INTRAVENOUS at 21:52

## 2019-02-03 RX ADMIN — SODIUM CHLORIDE 100 ML/HR: 9 INJECTION, SOLUTION INTRAVENOUS at 08:02

## 2019-02-03 RX ADMIN — TAZOBACTAM SODIUM AND PIPERACILLIN SODIUM 3.38 G: 375; 3 INJECTION, SOLUTION INTRAVENOUS at 21:30

## 2019-02-03 RX ADMIN — OXYBUTYNIN CHLORIDE 5 MG: 5 TABLET ORAL at 15:19

## 2019-02-03 RX ADMIN — VITAMIN D, TAB 1000IU (100/BT) 2000 UNITS: 25 TAB at 08:02

## 2019-02-03 RX ADMIN — KETOTIFEN FUMARATE 1 DROP: 0.35 SOLUTION/ DROPS OPHTHALMIC at 12:43

## 2019-02-03 RX ADMIN — Medication: at 22:12
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
## 2019-02-03 NOTE — PLAN OF CARE
Problem: Fall Risk (Adult)  Goal: Identify Related Risk Factors and Signs and Symptoms   02/03/19 0525   Fall Risk (Adult)   Related Risk Factors (Fall Risk) age-related changes;environment unfamiliar;fatigue/slow reaction;gait/mobility problems   Signs and Symptoms (Fall Risk) presence of risk factors       Problem: Skin Injury Risk (Adult)  Goal: Identify Related Risk Factors and Signs and Symptoms   02/03/19 0525   Skin Injury Risk (Adult)   Related Risk Factors (Skin Injury Risk) fluid intake inadequate;mobility impaired;moisture;nutritional deficiencies;tissue perfusion altered;infection     Goal: Skin Health and Integrity   02/03/19 0525   Skin Injury Risk (Adult)   Skin Health and Integrity making progress toward outcome       Problem: Infection, Risk/Actual (Adult)  Goal: Identify Related Risk Factors and Signs and Symptoms   02/03/19 0525   Infection, Risk/Actual (Adult)   Related Risk Factors (Infection, Risk/Actual) tissue perfusion altered;treatment plan;skin integrity impairment;immunization status;exposure to microbes;chronic illness/condition   Signs and Symptoms (Infection, Risk/Actual) malaise;pain;weakness;lab value changes;skin cool/clammy;diaphoresis;chills     Goal: Infection Prevention/Resolution   02/03/19 0525   Infection, Risk/Actual (Adult)   Infection Prevention/Resolution making progress toward outcome

## 2019-02-03 NOTE — PLAN OF CARE
Problem: Patient Care Overview  Goal: Plan of Care Review  Outcome: Ongoing (interventions implemented as appropriate)  WO nurse consult for pressure injury wound to right ischium.  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  Pt has 5.5cm of tunneling between the 2-3 o'clock positions.  Wound irrigated with sterile NS, packed with NS moistened rolled guaze wet to dry with dry dressing to cover.  [facility], wants to stay on his left side.  Pt stated he may go to  for plastic surgery.  I briefly spoke to him about a colostomy why it is necessary sometimes, permanent vs temporary.  Will give him more information if needed.  Will order dakin's 1/2 strength wet to dry packing changed BID, cover with dry dressing.  [facility] nures to follow several times a week until established POC.  Please call [facility] nurse at [phone] or [phone] if needed.  Thank you

## 2019-02-04 LAB
ALBUMIN SERPL-MCNC: 3.37 G/DL (ref 3.2–4.8)
ALBUMIN/GLOB SERPL: 1.4 G/DL (ref 1.5–2.5)
ALP SERPL-CCNC: 51 U/L (ref 25–100)
ALT SERPL W P-5'-P-CCNC: 16 U/L (ref 7–40)
ANION GAP SERPL CALCULATED.3IONS-SCNC: 7 MMOL/L (ref 3–11)
AST SERPL-CCNC: 16 U/L (ref 0–33)
BACTERIA SPEC AEROBE CULT: ABNORMAL
BASOPHILS # BLD AUTO: 0.08 10*3/MM3 (ref 0–0.2)
BASOPHILS NFR BLD AUTO: 1.1 % (ref 0–1)
BILIRUB SERPL-MCNC: 0.5 MG/DL (ref 0.3–1.2)
BUN BLD-MCNC: 22 MG/DL (ref 9–23)
BUN/CREAT SERPL: 21.2 (ref 7–25)
CALCIUM SPEC-SCNC: 8.6 MG/DL (ref 8.7–10.4)
CHLORIDE SERPL-SCNC: 95 MMOL/L (ref 99–109)
CO2 SERPL-SCNC: 23 MMOL/L (ref 20–31)
CREAT BLD-MCNC: 1.04 MG/DL (ref 0.6–1.3)
CRP SERPL-MCNC: 10.37 MG/DL (ref 0–1)
DEPRECATED RDW RBC AUTO: 46.8 FL (ref 37–54)
EOSINOPHIL # BLD AUTO: 0.22 10*3/MM3 (ref 0–0.3)
EOSINOPHIL NFR BLD AUTO: 3.1 % (ref 0–3)
ERYTHROCYTE [DISTWIDTH] IN BLOOD BY AUTOMATED COUNT: 13.4 % (ref 11.3–14.5)
ERYTHROCYTE [SEDIMENTATION RATE] IN BLOOD: 90 MM/HR (ref 0–20)
GFR SERPL CREATININE-BSD FRML MDRD: 73 ML/MIN/1.73
GLOBULIN UR ELPH-MCNC: 2.4 GM/DL
GLUCOSE BLD-MCNC: 86 MG/DL (ref 70–100)
HCT VFR BLD AUTO: 29.9 % (ref 38.9–50.9)
HGB BLD-MCNC: 10 G/DL (ref 13.1–17.5)
IMM GRANULOCYTES # BLD AUTO: 0.17 10*3/MM3 (ref 0–0.03)
IMM GRANULOCYTES NFR BLD AUTO: 2.4 % (ref 0–0.6)
LYMPHOCYTES # BLD AUTO: 1.22 10*3/MM3 (ref 0.6–4.8)
LYMPHOCYTES NFR BLD AUTO: 17 % (ref 24–44)
MCH RBC QN AUTO: 31.6 PG (ref 27–31)
MCHC RBC AUTO-ENTMCNC: 33.4 G/DL (ref 32–36)
MCV RBC AUTO: 94.6 FL (ref 80–99)
MONOCYTES # BLD AUTO: 1.46 10*3/MM3 (ref 0–1)
MONOCYTES NFR BLD AUTO: 20.3 % (ref 0–12)
NEUTROPHILS # BLD AUTO: 4.2 10*3/MM3 (ref 1.5–8.3)
NEUTROPHILS NFR BLD AUTO: 58.5 % (ref 41–71)
PLATELET # BLD AUTO: 372 10*3/MM3 (ref 150–450)
PMV BLD AUTO: 8.5 FL (ref 6–12)
POTASSIUM BLD-SCNC: 3.7 MMOL/L (ref 3.5–5.5)
PROT SERPL-MCNC: 5.8 G/DL (ref 5.7–8.2)
RBC # BLD AUTO: 3.16 10*6/MM3 (ref 4.2–5.76)
SODIUM BLD-SCNC: 125 MMOL/L (ref 132–146)
WBC NRBC COR # BLD: 7.18 10*3/MM3 (ref 3.5–10.8)

## 2019-02-04 PROCEDURE — 80053 COMPREHEN METABOLIC PANEL: CPT | Performed by: PHYSICIAN ASSISTANT

## 2019-02-04 PROCEDURE — 86140 C-REACTIVE PROTEIN: CPT | Performed by: PHYSICIAN ASSISTANT

## 2019-02-04 PROCEDURE — 25010000002 HEPARIN (PORCINE) PER 1000 UNITS: Performed by: INTERNAL MEDICINE

## 2019-02-04 PROCEDURE — 25010000002 PIPERACILLIN SOD-TAZOBACTAM PER 1 G: Performed by: INTERNAL MEDICINE

## 2019-02-04 PROCEDURE — 85025 COMPLETE CBC W/AUTO DIFF WBC: CPT | Performed by: PHYSICIAN ASSISTANT

## 2019-02-04 PROCEDURE — 02HV33Z INSERTION OF INFUSION DEVICE INTO SUPERIOR VENA CAVA, PERCUTANEOUS APPROACH: ICD-10-PCS | Performed by: INTERNAL MEDICINE

## 2019-02-04 PROCEDURE — C1751 CATH, INF, PER/CENT/MIDLINE: HCPCS

## 2019-02-04 PROCEDURE — C1894 INTRO/SHEATH, NON-LASER: HCPCS

## 2019-02-04 PROCEDURE — 85652 RBC SED RATE AUTOMATED: CPT | Performed by: PHYSICIAN ASSISTANT

## 2019-02-04 PROCEDURE — 25010000002 VANCOMYCIN 10 G RECONSTITUTED SOLUTION: Performed by: INTERNAL MEDICINE

## 2019-02-04 PROCEDURE — 99233 SBSQ HOSP IP/OBS HIGH 50: CPT | Performed by: INTERNAL MEDICINE

## 2019-02-04 RX ORDER — SODIUM CHLORIDE 0.9 % (FLUSH) 0.9 %
10 SYRINGE (ML) INJECTION EVERY 12 HOURS SCHEDULED
Status: DISCONTINUED | OUTPATIENT
Start: 2019-02-04 | End: 2019-02-06 | Stop reason: HOSPADM

## 2019-02-04 RX ORDER — SODIUM CHLORIDE 0.9 % (FLUSH) 0.9 %
10 SYRINGE (ML) INJECTION AS NEEDED
Status: DISCONTINUED | OUTPATIENT
Start: 2019-02-04 | End: 2019-02-06 | Stop reason: HOSPADM

## 2019-02-04 RX ADMIN — KETOTIFEN FUMARATE 1 DROP: 0.35 SOLUTION/ DROPS OPHTHALMIC at 21:21

## 2019-02-04 RX ADMIN — HEPARIN SODIUM 5000 UNITS: 5000 INJECTION INTRAVENOUS; SUBCUTANEOUS at 05:23

## 2019-02-04 RX ADMIN — KETOTIFEN FUMARATE 1 DROP: 0.35 SOLUTION/ DROPS OPHTHALMIC at 08:27

## 2019-02-04 RX ADMIN — HEPARIN SODIUM 5000 UNITS: 5000 INJECTION INTRAVENOUS; SUBCUTANEOUS at 21:21

## 2019-02-04 RX ADMIN — Medication: at 21:22

## 2019-02-04 RX ADMIN — CYANOCOBALAMIN TAB 1000 MCG 1000 MCG: 1000 TAB at 08:27

## 2019-02-04 RX ADMIN — TAZOBACTAM SODIUM AND PIPERACILLIN SODIUM 3.38 G: 375; 3 INJECTION, SOLUTION INTRAVENOUS at 21:21

## 2019-02-04 RX ADMIN — SODIUM CHLORIDE, PRESERVATIVE FREE 3 ML: 5 INJECTION INTRAVENOUS at 21:22

## 2019-02-04 RX ADMIN — TAZOBACTAM SODIUM AND PIPERACILLIN SODIUM 3.38 G: 375; 3 INJECTION, SOLUTION INTRAVENOUS at 05:22

## 2019-02-04 RX ADMIN — VANCOMYCIN HYDROCHLORIDE 1250 MG: 10 INJECTION, POWDER, LYOPHILIZED, FOR SOLUTION INTRAVENOUS at 05:22

## 2019-02-04 RX ADMIN — VANCOMYCIN HYDROCHLORIDE 1250 MG: 10 INJECTION, POWDER, LYOPHILIZED, FOR SOLUTION INTRAVENOUS at 17:29

## 2019-02-04 RX ADMIN — OXYBUTYNIN CHLORIDE 5 MG: 5 TABLET ORAL at 08:27

## 2019-02-04 RX ADMIN — OXYBUTYNIN CHLORIDE 5 MG: 5 TABLET ORAL at 21:21

## 2019-02-04 RX ADMIN — VITAMIN D, TAB 1000IU (100/BT) 2000 UNITS: 25 TAB at 08:27

## 2019-02-04 RX ADMIN — MULTIPLE VITAMINS W/ MINERALS TAB 1 TABLET: TAB ORAL at 08:27

## 2019-02-04 RX ADMIN — HEPARIN SODIUM 5000 UNITS: 5000 INJECTION INTRAVENOUS; SUBCUTANEOUS at 13:56

## 2019-02-04 RX ADMIN — OXYBUTYNIN CHLORIDE 5 MG: 5 TABLET ORAL at 17:29

## 2019-02-04 RX ADMIN — TAZOBACTAM SODIUM AND PIPERACILLIN SODIUM 3.38 G: 375; 3 INJECTION, SOLUTION INTRAVENOUS at 13:56

## 2019-02-04 NOTE — PLAN OF CARE
Problem: Patient Care Overview  Goal: Plan of Care Review  Outcome: Ongoing (interventions implemented as appropriate)   02/02/19 1640 02/03/19 1546 02/03/19 2000   Coping/Psychosocial   Plan of Care Reviewed With --  --  patient;friend   Plan of Care Review   Progress --  no change --    OTHER   Outcome Summary pt arrived from ED, lu was inserted, wound dressing applied and waiting for wound consult. Antibiotics started. VSS. No compaints of pain, will continue to monitor --  --      Goal: Individualization and Mutuality  Outcome: Ongoing (interventions implemented as appropriate)    Goal: Discharge Needs Assessment  Outcome: Ongoing (interventions implemented as appropriate)   02/02/19 1515 02/02/19 1640   Discharge Needs Assessment   Readmission Within the Last 30 Days --  no previous admission in last 30 days   Patient/Family Anticipates Transition to --  home with family   Patient/Family Anticipated Services at Transition  --    Transportation Concerns --  car, none   Transportation Anticipated --  car, drives self   Equipment Needed After Discharge --  none   Disability   Equipment Currently Used at Home --  wheelchair, motorized;wound care supplies     Goal: Interprofessional Rounds/Family Conf  Outcome: Ongoing (interventions implemented as appropriate)      Problem: Fall Risk (Adult)  Goal: Identify Related Risk Factors and Signs and Symptoms  Outcome: Ongoing (interventions implemented as appropriate)   02/03/19 0525   Fall Risk (Adult)   Related Risk Factors (Fall Risk) age-related changes;environment unfamiliar;fatigue/slow reaction;gait/mobility problems   Signs and Symptoms (Fall Risk) presence of risk factors     Goal: Absence of Fall  Outcome: Ongoing (interventions implemented as appropriate)      Problem: Skin Injury Risk (Adult)  Goal: Identify Related Risk Factors and Signs and Symptoms  Outcome: Ongoing (interventions implemented as appropriate)   02/03/19 0525   Skin Injury Risk  (Adult)   Related Risk Factors (Skin Injury Risk) fluid intake inadequate;mobility impaired;moisture;nutritional deficiencies;tissue perfusion altered;infection     Goal: Skin Health and Integrity  Outcome: Ongoing (interventions implemented as appropriate)   02/03/19 0525   Skin Injury Risk (Adult)   Skin Health and Integrity making progress toward outcome       Problem: Infection, Risk/Actual (Adult)  Goal: Identify Related Risk Factors and Signs and Symptoms  Outcome: Ongoing (interventions implemented as appropriate)   02/03/19 0525   Infection, Risk/Actual (Adult)   Related Risk Factors (Infection, Risk/Actual) tissue perfusion altered;treatment plan;skin integrity impairment;immunization status;exposure to microbes;chronic illness/condition   Signs and Symptoms (Infection, Risk/Actual) malaise;pain;weakness;lab value changes;skin cool/clammy;diaphoresis;chills     Goal: Infection Prevention/Resolution  Outcome: Ongoing (interventions implemented as appropriate)   02/03/19 0525   Infection, Risk/Actual (Adult)   Infection Prevention/Resolution making progress toward outcome

## 2019-02-04 NOTE — PROGRESS NOTES
Discharge Planning Assessment  [facility]     Patient Name: Baudilio Toledo  MRN: [id number]  Today's Date: 2/4/2019    Admit Date: 2/2/2019    Discharge Needs Assessment     Row Name 02/04/19 1733       Living Environment    Lives With  alone    Current Living Arrangements  home/apartment/condo    Primary Care Provided by  self;other (see comments) private caregiver am and pm    Family Caregiver if Needed  sibling(s);friend(s)    Quality of Family Relationships  helpful;involved;supportive    Able to Return to Prior Arrangements  yes       Resource/Environmental Concerns    Resource/Environmental Concerns  none       Transition Planning    Patient/Family Anticipates Transition to  home    Patient/Family Anticipated Services at Transition  outpatient care;rehabilitation services TBD    Transportation Anticipated  car, drives self;family or friend will provide WC van       Discharge Needs Assessment    Readmission Within the Last 30 Days  no previous admission in last 30 days    Equipment Currently Used at Home  wheelchair, motorized;wound care supplies    Discharge Facility/Level of Care Needs  outpatient therapy IV abx        Discharge Plan     Row Name 02/04/19 1734       Plan    Plan  TBD    Patient/Family in Agreement with Plan  yes    Plan Comments  Met with patient in the room to initiate discharge planning. Patient lives alone in a home in [facility]. He has a caregiver that comes in mornings and evenings to help with ADLs secondary to quadriplegia. He works during the day. Patient uses a motorized WC with mobility. His goal is home at discharge and he will use his own WC van for transport. If patient is able to discharge home and requires IV antibiotics he would prefer to come to the [facility] outpatient infusion center daily for infusions, as well as weekly PICC care and labs. He does not have rx coverage to pay for IV abx at home. Patient's POC is still evolving. CM will continue to follow and assist with DC  planning.         Destination      No service coordination in this encounter.      Durable Medical Equipment      No service coordination in this encounter.      Dialysis/Infusion      No service coordination in this encounter.      Home Medical Care      No service coordination in this encounter.      Community Resources      No service coordination in this encounter.        Expected Discharge Date and Time     Expected Discharge Date Expected Discharge Time    Feb 7, 2019         Demographic Summary     Row Name 02/04/19 1732       General Information    Admission Type  inpatient    Referral Source  admission list    Reason for Consult  discharge planning    General Information Comments  PCP is Kevin Garcia       Contact Information    Permission Granted to Share Info With  ;family/designee sister, Zora Arellano; friend, Jennifer Baxter        Functional Status     Row Name 02/04/19 1732       Functional Status    Usual Activity Tolerance  good       Functional Status, IADL    Medications  independent    Meal Preparation  independent    Housekeeping  independent    Laundry  independent    Shopping  independent       Employment/    Employment/ Comments  Confirmed with patient that he has medical coverage through Medicare [employer] and [employer]. He does not have rx coverage.         Psychosocial    No documentation.       Abuse/Neglect    No documentation.       Legal    No documentation.       Substance Abuse    No documentation.       Patient Forms    No documentation.           Jocelyn Tadeo

## 2019-02-04 NOTE — PLAN OF CARE
Problem: Skin Injury Risk (Adult)  Goal: Skin Health and Integrity  Outcome: Ongoing (interventions implemented as appropriate)   02/04/19 2715   Skin Injury Risk (Adult)   Skin Health and Integrity making progress toward outcome
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
## 2019-02-04 NOTE — PROGRESS NOTES
[facility] Medicine Services  PROGRESS NOTE    Patient Name: Baudilio Toledo  : 1960  MRN: [id number]    Date of Admission: 2019  Length of Stay: 2  Primary Care Physician: Kevin Garcia DO    Subjective   Subjective     CC:  F/u infected sacral decub    HPI:  No changes overnight.  No fever.  He has questions about plan going forward.  ---  History:  Wound was small until recently.  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  No dm, no tob. Works at [employer].    Review of Systems  Gen- No fevers, chills  CV- No chest pain, palpitations  Resp- No cough, dyspnea  GI- No N/V/D, abd pain; good nutritional status  Neuro - dysreflexia causes sweating sometimes when he is moved.    Otherwise ROS is negative except as mentioned in the HPI.    Objective   Objective     Vital Signs:   Temp:  [97.9 °F (36.6 °C)-98.7 °F (37.1 °C)] 98.7 °F (37.1 °C)  Heart Rate:  [65-79] 73  Resp:  [16-18] 18  BP: (125-159)/(69-74) 159/74        Physical Exam:  Constitutional: No acute distress, awake, alert, lying in bed, friend present  HENT: NCAT, mucous membranes moist  Respiratory: Clear to auscultation bilaterally, respiratory effort normal   Cardiovascular: RRR, no murmurs, rubs, or gallops  Gastrointestinal: Positive bowel sounds, soft, nontender, nondistended  Musculoskeletal: No bilateral ankle edema, contractures.  Wound has no surrounding cellulitis, no odor, is packed with dressing not removed.  .  Psychiatric: Appropriate affect, cooperative  Neurologic: Oriented x 3, consistent with known quadraplegia  Skin: No rashes        Results Reviewed:  I have personally reviewed current lab, radiology, and data and agree.    Results from last 7 days   Lab Units 19  0515 19  0645 19  1235   WBC 10*3/mm3 7.18 9.35 15.86*   HEMOGLOBIN g/dL 10.0* 9.7* 10.4*   HEMATOCRIT % 29.9* 28.5* 30.2*   PLATELETS 10*3/mm3 372 305 284     Results from last  7 days   Lab Units 02/04/19  0515 02/03/19  1150 02/03/19  0645  02/02/19  1235   SODIUM mmol/L 125* 126* 124*   < > 121*   POTASSIUM mmol/L 3.7  --  3.9  --  3.9   CHLORIDE mmol/L 95*  --  95*  --  90*   CO2 mmol/L 23.0  --  23.0  --  22.0   BUN mg/dL 22  --  29*  --  38*   CREATININE mg/dL 1.04  --  1.19  --  1.39*   GLUCOSE mg/dL 86  --  107*  --  108*   CALCIUM mg/dL 8.6*  --  8.1*  --  8.6*   ALT (SGPT) U/L 16  --  16  --  18   AST (SGOT) U/L 16  --  18  --  21    < > = values in this interval not displayed.     Estimated Creatinine Clearance: 87.2 mL/min (by C-G formula based on SCr of 1.04 mg/dL).    No results found for: BNP    Microbiology Results Abnormal     Procedure Component Value - Date/Time    Wound Culture - Wound, Buttock, Right [[id number]]  (Abnormal) Collected:  02/02/19 1246    Lab Status:  Preliminary result Specimen:  Wound from Buttock, Right Updated:  02/04/19 0847     Wound Culture Scant growth (1+) Gram Negative Bacilli     Gram Stain No WBCs or organisms seen    Eosinophil Smear - Urine, Urine, Clean Catch [[id number]]  (Normal) Collected:  02/03/19 1653    Lab Status:  Final result Specimen:  Urine, Clean Catch Updated:  02/03/19 1724     Eosinophil Smear 0 % EOS/100 Cells     Narrative:       No eosinophil seen    Blood Culture - Blood, Wrist, Right [[id number]] Collected:  02/02/19 1235    Lab Status:  Preliminary result Specimen:  Blood from Wrist, Right Updated:  02/03/19 1400     Blood Culture No growth at 24 hours    Narrative:       Aerobic bottle only    Blood Culture - Blood, Wrist, Left [[id number]] Collected:  02/02/19 1235    Lab Status:  Preliminary result Specimen:  Blood from Wrist, Left Updated:  02/03/19 1400     Blood Culture No growth at 24 hours    Urine Culture - Urine, Urine, Catheter [[id number]]  (Abnormal) Collected:  02/02/19 1245    Lab Status:  Preliminary result Specimen:  Urine, Catheter Updated:  02/03/19 0713     Urine Culture <10,000 CFU/mL Gram Negative  Bacilli          Imaging Results (last 24 hours)     Procedure Component Value Units Date/Time    MRI Pelvis Without Contrast [[id number]] Collected:  02/04/19 0821     Updated:  02/04/19 0821    Narrative:       EXAMINATION: MRI PELVIS WO CONTRAST-     INDICATION: Right ischial osteomyelitis, right gluteal myositis;  L89.314-Pressure ulcer of right buttock, stage 4; L03.317-Cellulitis of  buttock; D72.829-Elevated white blood cell count, unspecified;  G82.50-Quadriplegia, unspecified.      TECHNIQUE: MRI pelvis without intravenous contrast.     COMPARISON: None.     FINDINGS: Intrapelvic structures are grossly unremarkable for soft  tissue findings without focal or loculated intrapelvic fluid collection.  Extensive soft tissue edema and cutaneous thickening of the bilateral  gluteal regions and soft tissues overlying the sacrum consistent with  cellulitis. Abnormal increased signal on T2 throughout the right gluteal  musculature which is severely atrophied, however, asymmetric when  compared to the left consistent with myositis. Abnormal partially  visualized soft tissue tract extending to the cutaneous surface overlie  the right gluteal region with areas of low signal scattered throughout  this confluent area of fluid signal suggesting phlegmonous process or  early abscess formation with soft tissue tract extension involving and  blurring the margins of the right initial tuberosity which has irregular  cortical margins noted posteriorly extending around the right lateral  side concerning for osteomyelitis. Central areas of low signal or signal  void concerning for soft tissue emphysema. Midline soft tissue  irregularity overlying the distal sacrum has associated minimal fluid  tract concern for sacral decubitus ulceration and extension to the level  of the sacrococcygeal junction where there is central area of low signal  T1 and T2 hyperintense signal within the bone consistent with sacral  involvement of  inflammation and osteomyelitis. Bilateral femoral heads  remain well located without large hip joint effusion, however,  contiguous soft tissue induration of the right hip in particular does  not exclude involvement given ischial tuberosity findings.       Impression:       Inflammatory process involving the right sacral and gluteal  region with midline sacral decubitus ulceration and minimal fluid  extending to the level of the sacrococcygeal angle where there is  abnormal bone marrow signal suggesting involvement of osteomyelitis.  However, more extensive process involving the right gluteal region  partially visualized with areas of low signal or signal void suggesting  soft tissue emphysema and extension to the cutaneous surface involving  the right ischial tuberosity which has blurred cortical margins again  suggesting osseous involvement of osteomyelitis. Confluent fluid signal  at this location concerning for phlegmon versus developing abscess  formation along with surrounding inflammatory findings of myositis and  cellulitis with asymmetric appearance compared to the left.      D:  02/04/2019  E:  02/04/2019                  I have reviewed the medications:    Current Facility-Administered Medications:   •  [START ON 2/5/2019] !vanc trough due prior to dose 2-5 0600. Hold dose until labs are back. Rx is following., , Does not apply, Once, Olga Go, [facility]  •  acetaminophen (TYLENOL) tablet 650 mg, 650 mg, Oral, Q4H PRN, Roxanna Mehta MD  •  cholecalciferol (VITAMIN D3) tablet 2,000 Units, 2,000 Units, Oral, Daily, Roxanna Mehta MD, 2,000 Units at 02/04/19 0827  •  heparin (porcine) 5000 UNIT/ML injection 5,000 Units, 5,000 Units, Subcutaneous, Q8H, Roxanna Mehta MD, 5,000 Units at 02/04/19 0523  •  ketotifen (ZADITOR) 0.025 % ophthalmic solution 1 drop, 1 drop, Both Eyes, BID, Roxanna Mehta MD, 1 drop at 02/04/19 0827  •  Magnesium Sulfate 2 gram Bolus, followed by 8 gram infusion  (total Mg dose 10 grams)- Mg less than or equal to 1mg/dL, 2 g, Intravenous, PRN **OR** Magnesium Sulfate 2 gram / 50mL Infusion (GIVE X 3 BAGS TO EQUAL 6GM TOTAL DOSE) - Mg 1.1 - 1.5 mg/dl, 2 g, Intravenous, PRN **OR** Magnesium Sulfate 4 gram infusion- Mg 1.6-1.9 mg/dL, 4 g, Intravenous, PRN, Roxanna Mehta MD  •  multivitamin with minerals 1 tablet, 1 tablet, Oral, Daily, Roxanna Mehta MD, 1 tablet at 02/04/19 0827  •  ondansetron (ZOFRAN) tablet 4 mg, 4 mg, Oral, Q6H PRN **OR** ondansetron (ZOFRAN) injection 4 mg, 4 mg, Intravenous, Q6H PRN, Roxanna Mehta MD  •  oxybutynin (DITROPAN) tablet 5 mg, 5 mg, Oral, TID, Roxanna Mehta MD, 5 mg at 02/04/19 0827  •  [DISCONTINUED] vancomycin 1750 mg/500 mL 0.9% NS IVPB ([facility]), 20 mg/kg, Intravenous, Once **AND** Pharmacy to dose vancomycin, , Does not apply, Continuous PRN, Roxanna Mehta MD  •  piperacillin-tazobactam (ZOSYN) 3.375 g in iso-osmotic dextrose 50 ml (premix), 3.375 g, Intravenous, Q8H, Roxanna Mehta MD, 3.375 g at 02/04/19 0522  •  potassium chloride (MICRO-K) CR capsule 40 mEq, 40 mEq, Oral, PRN **OR** potassium chloride (KLOR-CON) packet 40 mEq, 40 mEq, Oral, PRN **OR** potassium chloride 10 mEq in 100 mL IVPB, 10 mEq, Intravenous, Q1H PRN, Roxanna Mehta MD  •  sennosides-docusate sodium (SENOKOT-S) 8.6-50 MG tablet 2 tablet, 2 tablet, Oral, Nightly, Roxanna Mehta MD, 2 tablet at 02/02/19 2039  •  [COMPLETED] Insert peripheral IV, , , Once **AND** sodium chloride 0.9 % flush 10 mL, 10 mL, Intravenous, PRN, Leydi Swift APRN  •  sodium chloride 0.9 % flush 3 mL, 3 mL, Intravenous, Q12H, Roxanna Mehta MD, 3 mL at 02/03/19 2132  •  sodium chloride 0.9 % flush 3-10 mL, 3-10 mL, Intravenous, PRN, Roxanna Mehta MD  •  sodium chloride 0.9 % infusion, 100 mL/hr, Intravenous, Continuous, Roxanna Mehta MD, Last Rate: 100 mL/hr at 02/03/19 0802, 100 mL/hr at 02/03/19 0802  •  sodium hypochlorite (DAKIN'S) 0.025 %  topical solution solution, , Topical, Q12H, Danilo Freitas MD, Stopped at 02/04/19 0920  •  vancomycin (dosing per levels), , Does not apply, Daily, Ivonne Damian, [facility]  •  vancomycin 1250 mg/250 mL 0.9% NS IVPB ([facility]), 15 mg/kg, Intravenous, Q12H, Roxanna Mehta MD, 1,250 mg at 02/04/19 0522  •  vitamin B-12 (CYANOCOBALAMIN) tablet 1,000 mcg, 1,000 mcg, Oral, Daily, Roxanna Mehta MD, 1,000 mcg at 02/04/19 0827      Assessment/Plan   Assessment / Plan     Active Hospital Problems    Diagnosis Date Noted   • Decubitus ulcer of sacral area [L89.159] 02/02/2019   • Cellulitis [L03.90] 02/02/2019   • Hyponatremia [E87.1] 02/02/2019   • Dysautonomia (CMS/[facility]) [G90.1] 02/02/2019   • Cellulitis and abscess of buttock [L02.31, L03.317] 02/02/2019   • JUANA (acute kidney injury) (CMS/[facility]) [N17.9] 05/06/2017   • Paraplegia (CMS/[facility]) [G82.20] 05/31/2016          Brief Hospital Course to date:  Baudilio Toledo is a 58 y.o. male with paraplegia since 1983 presents with sacral ulcer infection, ARF, and hyponatremia.      Infected Sacral Ulcer with Cellulitis and probable osteomyelitis  - appreciate ID eval.  Continue Vanc/zosyn for now and await any cultures.  - MRI suggests osteo at sacral-coggygeal angle and R ischial tuberosity  - may ultimately need transfer to  for debridment  - PICC placement  - await opinion o fPlastics.  May need second MRI for delineation of ischial involvement.      JUANA  - improving after hydration, renal following     Hyponatremia  -stable  on IVF  - renal following.  Continue saline.       UTI  - Patient reports that he develops dysautonomia when he has a UTI. Urine culture growing E. Coli. Susceptible to zosyn as above.  Per ID      Paraplegia with dysautonomia  - Complicates care. Will need air mattress to avoid additional ulcers. Reports that he developed an ulcer overnight while in hospital last.   - Requires self caths.        DVT Prophylaxis:  Sq heparin    Disposition: I expect the  patient to be discharged when medically ready, may need transfer to .    CODE STATUS:   Code Status and Medical Interventions:   Ordered at: 02/02/19 1351     Level Of Support Discussed With:    Patient     Code Status:    CPR     Medical Interventions (Level of Support Prior to Arrest):    Full         Electronically signed by Jael Beltrán MD, 02/04/19, 11:02 AM.

## 2019-02-04 NOTE — PROGRESS NOTES
"   LOS: 2 days    Patient Care Team:  Kevin Garcia DO as PCP - General (Family Medicine)        Subjective     Interval History:     No acute events overnight. No new complaints.   Review of Systems:   No CP or SOA    Objective     Vital Sign Min/Max for last 24 hours  Temp  Min: 97.9 °F (36.6 °C)  Max: 98.7 °F (37.1 °C)   BP  Min: 125/73  Max: 159/74   Pulse  Min: 65  Max: 79   Resp  Min: 16  Max: 18   SpO2  Min: 97 %  Max: 100 %   No Data Recorded   No Data Recorded     Flowsheet Rows      First Filed Value   Admission Height  180.3 cm (71\") Documented at 02/02/2019 1049   Admission Weight  81.6 kg (180 lb) Documented at 02/02/2019 1049          No intake/output data recorded.  I/O last 3 completed shifts:  In: 290 [P.O.:240; IV Piggyback:50]  Out: 4250 [Urine:4250]    Physical Exam:     General Appearance:    Alert, cooperative, in no acute distress   Head:    Normocephalic, without obvious abnormality, atraumatic               Neck:   No adenopathy, supple, trachea midline, no thyromegaly, no   carotid bruit, no JVD       Lungs:     Clear to auscultation,respirations regular, even and                  unlabored    Heart:    Regular rhythm and normal rate, normal S1 and S2, no            murmur, no gallop, no rub, no click       Abdomen:     Normal bowel sounds, no masses, no organomegaly, soft        non-tender, non-distended, no guarding, no rebound                tenderness       Extremities:   no edema, no cyanosis, no redness               Neurologic:   Cranial nerves 2 - 12 grossly intact. Known quadraplegia.       WBC WBC   Date Value Ref Range Status   02/04/2019 7.18 3.50 - 10.80 10*3/mm3 Final   02/03/2019 9.35 3.50 - 10.80 10*3/mm3 Final   02/02/2019 15.86 (H) 3.50 - 10.80 10*3/mm3 Final      HGB Hemoglobin   Date Value Ref Range Status   02/04/2019 10.0 (L) 13.1 - 17.5 g/dL Final   02/03/2019 9.7 (L) 13.1 - 17.5 g/dL Final   02/02/2019 10.4 (L) 13.1 - 17.5 g/dL Final      HCT Hematocrit   Date " Value Ref Range Status   02/04/2019 29.9 (L) 38.9 - 50.9 % Final   02/03/2019 28.5 (L) 38.9 - 50.9 % Final   02/02/2019 30.2 (L) 38.9 - 50.9 % Final      Platlets No results found for: LABPLAT   MCV MCV   Date Value Ref Range Status   02/04/2019 94.6 80.0 - 99.0 fL Final   02/03/2019 95.0 80.0 - 99.0 fL Final   02/02/2019 94.1 80.0 - 99.0 fL Final          Sodium Sodium   Date Value Ref Range Status   02/04/2019 125 (L) 132 - 146 mmol/L Final   02/03/2019 126 (L) 132 - 146 mmol/L Final   02/03/2019 124 (L) 132 - 146 mmol/L Final   02/03/2019 122 (L) 132 - 146 mmol/L Final   02/02/2019 122 (L) 132 - 146 mmol/L Final   02/02/2019 121 (L) 132 - 146 mmol/L Final      Potassium Potassium   Date Value Ref Range Status   02/04/2019 3.7 3.5 - 5.5 mmol/L Final   02/03/2019 3.9 3.5 - 5.5 mmol/L Final   02/02/2019 3.9 3.5 - 5.5 mmol/L Final      Chloride Chloride   Date Value Ref Range Status   02/04/2019 95 (L) 99 - 109 mmol/L Final   02/03/2019 95 (L) 99 - 109 mmol/L Final   02/02/2019 90 (L) 99 - 109 mmol/L Final      CO2 CO2   Date Value Ref Range Status   02/04/2019 23.0 20.0 - 31.0 mmol/L Final   02/03/2019 23.0 20.0 - 31.0 mmol/L Final   02/02/2019 22.0 20.0 - 31.0 mmol/L Final      BUN BUN   Date Value Ref Range Status   02/04/2019 22 9 - 23 mg/dL Final   02/03/2019 29 (H) 9 - 23 mg/dL Final   02/02/2019 38 (H) 9 - 23 mg/dL Final      Creatinine Creatinine   Date Value Ref Range Status   02/04/2019 1.04 0.60 - 1.30 mg/dL Final   02/03/2019 1.19 0.60 - 1.30 mg/dL Final   02/02/2019 1.39 (H) 0.60 - 1.30 mg/dL Final      Calcium Calcium   Date Value Ref Range Status   02/04/2019 8.6 (L) 8.7 - 10.4 mg/dL Final   02/03/2019 8.1 (L) 8.7 - 10.4 mg/dL Final   02/02/2019 8.6 (L) 8.7 - 10.4 mg/dL Final      PO4 No results found for: CAPO4   Albumin Albumin   Date Value Ref Range Status   02/04/2019 3.37 3.20 - 4.80 g/dL Final   02/03/2019 3.16 (L) 3.20 - 4.80 g/dL Final   02/02/2019 3.50 3.20 - 4.80 g/dL Final      Magnesium No  results found for: MG   Uric Acid Uric Acid   Date Value Ref Range Status   02/03/2019 5.4 3.7 - 9.2 mg/dL Final     Comment:     Falsely depressed results may occur on samples drawn from patients receiving N-Acetylcysteine (NAC) or Metamizole.           Results Review:     I reviewed the patient's new clinical results.      [START ON 2/5/2019] Pharmacy Consult  Does not apply Once   cholecalciferol 2,000 Units Oral Daily   heparin (porcine) 5,000 Units Subcutaneous Q8H   ketotifen 1 drop Both Eyes BID   multivitamin with minerals 1 tablet Oral Daily   oxybutynin 5 mg Oral TID   piperacillin-tazobactam 3.375 g Intravenous Q8H   sennosides-docusate sodium 2 tablet Oral Nightly   sodium chloride 3 mL Intravenous Q12H   sodium hypochlorite  Topical Q12H   vancomycin (dosing per levels)  Does not apply Daily   vancomycin 15 mg/kg Intravenous Q12H   vitamin B-12 1,000 mcg Oral Daily       Pharmacy to dose vancomycin     sodium chloride 100 mL/hr Last Rate: 100 mL/hr (02/03/19 0802)       Medication Review:     Assessment/Plan       Paraplegia (CMS/HCC)    JUANA (acute kidney injury) (CMS/[facility])    Decubitus ulcer of sacral area    Cellulitis    Hyponatremia    Dysautonomia (CMS/HCC)    Cellulitis and abscess of buttock      1- Hyponatremia - 125 - slightly improved.   2- JUANA - Pre-renal azotemia -  resolved.      Plan:-  - IV fluids.   - Adjust meds per renal function   - Avoid nephrotoxic agents.         I discussed the patients findings and my recommendations with patient and nursing staff    Micah Tipton MD  02/04/19  9:11 AM

## 2019-02-05 VITALS
TEMPERATURE: 98.6 F | RESPIRATION RATE: 16 BRPM | OXYGEN SATURATION: 98 % | BODY MASS INDEX: 25.2 KG/M2 | HEART RATE: 84 BPM | SYSTOLIC BLOOD PRESSURE: 139 MMHG | WEIGHT: 180 LBS | HEIGHT: 71 IN | DIASTOLIC BLOOD PRESSURE: 80 MMHG

## 2019-02-05 LAB
ANION GAP SERPL CALCULATED.3IONS-SCNC: 7 MMOL/L (ref 3–11)
BACTERIA SPEC AEROBE CULT: ABNORMAL
BUN BLD-MCNC: 17 MG/DL (ref 9–23)
BUN/CREAT SERPL: 21.3 (ref 7–25)
CALCIUM SPEC-SCNC: 8.1 MG/DL (ref 8.7–10.4)
CHLORIDE SERPL-SCNC: 99 MMOL/L (ref 99–109)
CO2 SERPL-SCNC: 24 MMOL/L (ref 20–31)
CREAT BLD-MCNC: 0.8 MG/DL (ref 0.6–1.3)
GFR SERPL CREATININE-BSD FRML MDRD: 99 ML/MIN/1.73
GLUCOSE BLD-MCNC: 86 MG/DL (ref 70–100)
GRAM STN SPEC: ABNORMAL
POTASSIUM BLD-SCNC: 4 MMOL/L (ref 3.5–5.5)
SODIUM BLD-SCNC: 130 MMOL/L (ref 132–146)
VANCOMYCIN TROUGH SERPL-MCNC: 24.6 MCG/ML (ref 10–20)

## 2019-02-05 PROCEDURE — 25010000002 PIPERACILLIN SOD-TAZOBACTAM PER 1 G: Performed by: INTERNAL MEDICINE

## 2019-02-05 PROCEDURE — 80048 BASIC METABOLIC PNL TOTAL CA: CPT

## 2019-02-05 PROCEDURE — 25010000002 HEPARIN (PORCINE) PER 1000 UNITS: Performed by: INTERNAL MEDICINE

## 2019-02-05 PROCEDURE — 99239 HOSP IP/OBS DSCHRG MGMT >30: CPT | Performed by: NURSE PRACTITIONER

## 2019-02-05 PROCEDURE — 80202 ASSAY OF VANCOMYCIN: CPT

## 2019-02-05 PROCEDURE — 25010000002 VANCOMYCIN 10 G RECONSTITUTED SOLUTION

## 2019-02-05 PROCEDURE — 99233 SBSQ HOSP IP/OBS HIGH 50: CPT | Performed by: INTERNAL MEDICINE

## 2019-02-05 RX ORDER — HEPARIN SODIUM 5000 [USP'U]/ML
5000 INJECTION, SOLUTION INTRAVENOUS; SUBCUTANEOUS EVERY 8 HOURS SCHEDULED
Start: 2019-02-05

## 2019-02-05 RX ORDER — ACETAMINOPHEN 325 MG/1
650 TABLET ORAL EVERY 4 HOURS PRN
Start: 2019-02-05

## 2019-02-05 RX ORDER — ONDANSETRON 4 MG/1
4 TABLET, FILM COATED ORAL EVERY 6 HOURS PRN
Start: 2019-02-05

## 2019-02-05 RX ORDER — SENNA AND DOCUSATE SODIUM 50; 8.6 MG/1; MG/1
2 TABLET, FILM COATED ORAL NIGHTLY
Start: 2019-02-05

## 2019-02-05 RX ADMIN — Medication: at 08:33

## 2019-02-05 RX ADMIN — KETOTIFEN FUMARATE 1 DROP: 0.35 SOLUTION/ DROPS OPHTHALMIC at 08:33

## 2019-02-05 RX ADMIN — HEPARIN SODIUM 5000 UNITS: 5000 INJECTION INTRAVENOUS; SUBCUTANEOUS at 20:51

## 2019-02-05 RX ADMIN — SENNOSIDES AND DOCUSATE SODIUM 2 TABLET: 8.6; 5 TABLET ORAL at 20:51

## 2019-02-05 RX ADMIN — OXYBUTYNIN CHLORIDE 5 MG: 5 TABLET ORAL at 15:55

## 2019-02-05 RX ADMIN — TAZOBACTAM SODIUM AND PIPERACILLIN SODIUM 3.38 G: 375; 3 INJECTION, SOLUTION INTRAVENOUS at 20:52

## 2019-02-05 RX ADMIN — KETOTIFEN FUMARATE 1 DROP: 0.35 SOLUTION/ DROPS OPHTHALMIC at 20:52

## 2019-02-05 RX ADMIN — HEPARIN SODIUM 5000 UNITS: 5000 INJECTION INTRAVENOUS; SUBCUTANEOUS at 05:30

## 2019-02-05 RX ADMIN — TAZOBACTAM SODIUM AND PIPERACILLIN SODIUM 3.38 G: 375; 3 INJECTION, SOLUTION INTRAVENOUS at 13:48

## 2019-02-05 RX ADMIN — OXYBUTYNIN CHLORIDE 5 MG: 5 TABLET ORAL at 08:32

## 2019-02-05 RX ADMIN — HEPARIN SODIUM 5000 UNITS: 5000 INJECTION INTRAVENOUS; SUBCUTANEOUS at 13:47

## 2019-02-05 RX ADMIN — VANCOMYCIN HYDROCHLORIDE 1500 MG: 10 INJECTION, POWDER, LYOPHILIZED, FOR SOLUTION INTRAVENOUS at 18:54

## 2019-02-05 RX ADMIN — SODIUM CHLORIDE 100 ML/HR: 9 INJECTION, SOLUTION INTRAVENOUS at 11:54

## 2019-02-05 RX ADMIN — VITAMIN D, TAB 1000IU (100/BT) 2000 UNITS: 25 TAB at 08:32

## 2019-02-05 RX ADMIN — OXYBUTYNIN CHLORIDE 5 MG: 5 TABLET ORAL at 20:51

## 2019-02-05 RX ADMIN — SODIUM CHLORIDE, PRESERVATIVE FREE 10 ML: 5 INJECTION INTRAVENOUS at 20:53

## 2019-02-05 RX ADMIN — TAZOBACTAM SODIUM AND PIPERACILLIN SODIUM 3.38 G: 375; 3 INJECTION, SOLUTION INTRAVENOUS at 05:30

## 2019-02-05 RX ADMIN — MULTIPLE VITAMINS W/ MINERALS TAB 1 TABLET: TAB ORAL at 08:33

## 2019-02-05 RX ADMIN — CYANOCOBALAMIN TAB 1000 MCG 1000 MCG: 1000 TAB at 08:32

## 2019-02-05 RX ADMIN — Medication: at 20:53

## 2019-02-05 RX ADMIN — SODIUM CHLORIDE, PRESERVATIVE FREE 10 ML: 5 INJECTION INTRAVENOUS at 08:34

## 2019-02-05 RX ADMIN — SODIUM CHLORIDE, PRESERVATIVE FREE 3 ML: 5 INJECTION INTRAVENOUS at 20:52

## 2019-02-05 NOTE — PROGRESS NOTES
"Pharmacy Consult-Vancomycin Dosing  Baudilio Toledo is a  58 y.o. male receiving vancomycin therapy.     Indication: Skin and soft tissue infection/possible osteoyelitis  Consulting Provider: Hospitalist  ID Consult: Yes    Goal Trough: 15-20 mcg/mL    Current Antimicrobial Therapy    Vancomycin (pharmacy dosing) - day 4  Piperacillin-tazobactam 3.375 g IV every 8 hours - day 4    Allergies  Allergies as of 02/02/2019 - Reviewed 02/02/2019   Allergen Reaction Noted   • Sulfa antibiotics Rash 05/31/2016     Labs    Results from last 7 days   Lab Units 02/05/19  0633 02/04/19  0515 02/03/19  0645   BUN mg/dL 17 22 29*   CREATININE mg/dL 0.80 1.04 1.19     Results from last 7 days   Lab Units 02/04/19  0515 02/03/19  0645 02/02/19  1235   WBC 10*3/mm3 7.18 9.35 15.86*     Evaluation of Dosing    Ht - 180.3 cm (71\")  Wt - 81.6 kg (180 lb)    Estimated Creatinine Clearance: 113.3 mL/min (by C-G formula based on SCr of 0.8 mg/dL).    Intake & Output (last 3 days)         02/02 0701 - 02/03 0700 02/03 0701 - 02/04 0700 02/04 0701 - 02/05 0700 02/05 0701 - 02/06 0700    P.O. 240 240 960     IV Piggyback  50      Total Intake(mL/kg) 240 (2.9) 290 (3.6) 960 (11.8)     Urine (mL/kg/hr) 1100 3450 (1.8) 2350 (1.2)     Total Output 1100 3450 2350     Net -860 -3160 -1390             Stool Unmeasured Occurrence   1 x           Microbiology and Radiology  Microbiology Results (last 10 days)       Procedure Component Value - Date/Time    Eosinophil Smear - Urine, Urine, Clean Catch [[id number]]  (Normal) Collected:  02/03/19 1653    Lab Status:  Final result Specimen:  Urine, Clean Catch Updated:  02/03/19 1724     Eosinophil Smear 0 % EOS/100 Cells     Narrative:       No eosinophil seen    Wound Culture - Wound, Buttock, Right [[id number]]  (Abnormal) Collected:  02/02/19 1246    Lab Status:  Preliminary result Specimen:  Wound from Buttock, Right Updated:  02/04/19 0847     Wound Culture Scant growth (1+) Gram Negative Bacilli     " Gram Stain No WBCs or organisms seen    Urine Culture - Urine, Urine, Catheter [[id number]]  (Abnormal)  (Susceptibility) Collected:  02/02/19 1245    Lab Status:  Final result Specimen:  Urine, Catheter Updated:  02/04/19 1304     Urine Culture <10,000 CFU/mL Escherichia coli    Susceptibility        Escherichia coli     MIGUEL     Ampicillin Resistant     Ampicillin + Sulbactam Resistant     Aztreonam Susceptible     Cefepime Susceptible     Cefotaxime Susceptible     Ceftriaxone Susceptible     Cefuroxime sodium Susceptible     Cephalothin Resistant     Ciprofloxacin Resistant     Ertapenem Susceptible     Gentamicin Susceptible     Levofloxacin Resistant     Meropenem Susceptible     Nitrofurantoin Susceptible     Piperacillin + Tazobactam Susceptible     Tetracycline Resistant     Tobramycin Susceptible     Trimethoprim + Sulfamethoxazole Resistant                      Blood Culture - Blood, Wrist, Left [[id number]] Collected:  02/02/19 1235    Lab Status:  Preliminary result Specimen:  Blood from Wrist, Left Updated:  02/04/19 1400     Blood Culture No growth at 2 days    Blood Culture - Blood, Wrist, Right [[id number]] Collected:  02/02/19 1235    Lab Status:  Preliminary result Specimen:  Blood from Wrist, Right Updated:  02/04/19 1400     Blood Culture No growth at 2 days    Narrative:       Aerobic bottle only    Urine Culture - Urine, Urine, Random Void [[id number]]  (Abnormal)  (Susceptibility) Collected:  01/31/19 1548    Lab Status:  Final result Specimen:  Urine, Random Void Updated:  02/02/19 1106     Urine Culture 40,000-50,000 CFU/mL Escherichia coli    Susceptibility        Escherichia coli     MIGUEL     Ampicillin Resistant     Ampicillin + Sulbactam Resistant     Aztreonam Susceptible     Cefepime Susceptible     Cefotaxime Susceptible     Ceftriaxone Susceptible     Cefuroxime sodium Susceptible     Cephalothin Intermediate     Ciprofloxacin Resistant     Ertapenem Susceptible     Gentamicin Susceptible      Levofloxacin Resistant     Meropenem Susceptible     Nitrofurantoin Susceptible     Piperacillin + Tazobactam Susceptible     Tetracycline Susceptible     Tobramycin Susceptible     Trimethoprim + Sulfamethoxazole Susceptible                            Evaluation of Level    Lab Results   Component Value Date    GINNA 24.60 (H) 02/05/2019    VANCARJUNNDOM 10.90 02/03/2019 2/2 Vancomycin 2000 mg x 1 given    2/3 Vancomycin random (24 hours after loading dose) = 10.9 mcg/mL       Vancomycin 1250 mg IV every 12 hours started    2/5 Vancomycin trough (~11 hour level) = 24.6 mcg/mL       Vancomycin dose decreased to 1500 mg IV every 24 hours    Assessment/Plan:    Pharmacy to dose vancomycin for skin and soft tissue infection/possible osteomyelitis.  Patient is currently receiving vancomycin 1250 mg IV every 12 hours.   Of note, patient is paraplegic, therefore SCr may not accurately depict renal function due to possible low muscle mass.  Vancomycin trough today (~11 hour level) is supratherapeutic at 24.6 mcg/mL.  Will hold dose this morning and decrease regimen to vancomycin 1500 mg IV every 24 hours starting tonight.  Repeat vancomycin trough 2/7 @ 1730.  Monitor renal function, cultures and sensitivities, and clinical status, and adjust regimen as necessary.  Pharmacy will continue to follow.    Sonya Anderson, PharmD  2/5/2019  7:50 AM

## 2019-02-05 NOTE — PROGRESS NOTES
[facility] Medicine Services  PROGRESS NOTE    Patient Name: Baudilio Toledo  : 1960  MRN: [id number]    Date of Admission: 2019  Length of Stay: 3  Primary Care Physician: Kevin Garcia DO    Subjective   Subjective     CC:  F/u infected sacral decub    HPI:  No change.  Slept okay last night.   ---  History:  Wound was small until recently.  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  No dm, no tob. Works at [employer].    Review of Systems  Gen- No fevers, chills  CV- No chest pain, palpitations  Resp- No cough, dyspnea  GI- No N/V/D, abd pain; good nutritional status  Neuro - dysreflexia causes sweating sometimes when he is moved.   - lu here.  At home, uses I/o cathing    Otherwise ROS is negative except as mentioned in the HPI.    Objective   Objective     Vital Signs:   Temp:  [97.9 °F (36.6 °C)-98.6 °F (37 °C)] 98.6 °F (37 °C)  Heart Rate:  [59-84] 84  Resp:  [16-18] 16  BP: (142-164)/(77-92) 151/88        Physical Exam:  Constitutional: No acute distress, awake, alert, lying in bed, friend present  HENT: NCAT, mucous membranes moist  Respiratory: Clear to auscultation bilaterally, respiratory effort normal   Cardiovascular: RRR, no murmurs, rubs, or gallops  Gastrointestinal: Positive bowel sounds, soft, nontender, nondistended  Musculoskeletal: No bilateral ankle edema, contractures.  Wound has no surrounding cellulitis, no odor, is packed with dressing not removed.  .  Psychiatric: Appropriate affect, cooperative  Neurologic: Oriented x 3, consistent with known quadraplegia  Skin: No rashes        Results Reviewed:  I have personally reviewed current lab, radiology, and data and agree.    Results from last 7 days   Lab Units 19  0515 19  0645 19  1235   WBC 10*3/mm3 7.18 9.35 15.86*   HEMOGLOBIN g/dL 10.0* 9.7* 10.4*   HEMATOCRIT % 29.9* 28.5* 30.2*   PLATELETS 10*3/mm3 372 305 284     Results from last  7 days   Lab Units 02/05/19  0633 02/04/19  0515 02/03/19  1150 02/03/19  0645  02/02/19  1235   SODIUM mmol/L 130* 125* 126* 124*   < > 121*   POTASSIUM mmol/L 4.0 3.7  --  3.9  --  3.9   CHLORIDE mmol/L 99 95*  --  95*  --  90*   CO2 mmol/L 24.0 23.0  --  23.0  --  22.0   BUN mg/dL 17 22  --  29*  --  38*   CREATININE mg/dL 0.80 1.04  --  1.19  --  1.39*   GLUCOSE mg/dL 86 86  --  107*  --  108*   CALCIUM mg/dL 8.1* 8.6*  --  8.1*  --  8.6*   ALT (SGPT) U/L  --  16  --  16  --  18   AST (SGOT) U/L  --  16  --  18  --  21    < > = values in this interval not displayed.     Estimated Creatinine Clearance: 113.3 mL/min (by C-G formula based on SCr of 0.8 mg/dL).    No results found for: BNP    Microbiology Results Abnormal     Procedure Component Value - Date/Time    Blood Culture - Blood, Wrist, Left [[id number]] Collected:  02/02/19 1235    Lab Status:  Preliminary result Specimen:  Blood from Wrist, Left Updated:  02/04/19 1400     Blood Culture No growth at 2 days    Blood Culture - Blood, Wrist, Right [[id number]] Collected:  02/02/19 1235    Lab Status:  Preliminary result Specimen:  Blood from Wrist, Right Updated:  02/04/19 1400     Blood Culture No growth at 2 days    Narrative:       Aerobic bottle only    Urine Culture - Urine, Urine, Catheter [[id number]]  (Abnormal)  (Susceptibility) Collected:  02/02/19 2495    Lab Status:  Final result Specimen:  Urine, Catheter Updated:  02/04/19 1304     Urine Culture <10,000 CFU/mL Escherichia coli    Susceptibility      Escherichia coli     MIGUEL     Ampicillin Resistant     Ampicillin + Sulbactam Resistant     Aztreonam Susceptible     Cefepime Susceptible     Cefotaxime Susceptible     Ceftriaxone Susceptible     Cefuroxime sodium Susceptible     Cephalothin Resistant     Ciprofloxacin Resistant     Ertapenem Susceptible     Gentamicin Susceptible     Levofloxacin Resistant     Meropenem Susceptible     Nitrofurantoin Susceptible     Piperacillin + Tazobactam  Susceptible     Tetracycline Resistant     Tobramycin Susceptible     Trimethoprim + Sulfamethoxazole Resistant                    Wound Culture - Wound, Buttock, Right [[id number]]  (Abnormal) Collected:  02/02/19 1246    Lab Status:  Preliminary result Specimen:  Wound from Buttock, Right Updated:  02/04/19 0847     Wound Culture Scant growth (1+) Gram Negative Bacilli     Gram Stain No WBCs or organisms seen    Eosinophil Smear - Urine, Urine, Clean Catch [[id number]]  (Normal) Collected:  02/03/19 1653    Lab Status:  Final result Specimen:  Urine, Clean Catch Updated:  02/03/19 1724     Eosinophil Smear 0 % EOS/100 Cells     Narrative:       No eosinophil seen          Imaging Results (last 24 hours)     Procedure Component Value Units Date/Time    MRI Pelvis Without Contrast [[id number]] Collected:  02/04/19 0821     Updated:  02/04/19 1250    Narrative:       EXAMINATION: MRI PELVIS WO CONTRAST-     INDICATION: Right ischial osteomyelitis, right gluteal myositis;  L89.314-Pressure ulcer of right buttock, stage 4; L03.317-Cellulitis of  buttock; D72.829-Elevated white blood cell count, unspecified;  G82.50-Quadriplegia, unspecified.      TECHNIQUE: MRI pelvis without intravenous contrast.     COMPARISON: None.     FINDINGS: Intrapelvic structures are grossly unremarkable for soft  tissue findings without focal or loculated intrapelvic fluid collection.  Extensive soft tissue edema and cutaneous thickening of the bilateral  gluteal regions and soft tissues overlying the sacrum consistent with  cellulitis. Abnormal increased signal on T2 throughout the right gluteal  musculature which is severely atrophied, however, asymmetric when  compared to the left consistent with myositis. Abnormal partially  visualized soft tissue tract extending to the cutaneous surface overlie  the right gluteal region with areas of low signal scattered throughout  this confluent area of fluid signal suggesting phlegmonous process  or  early abscess formation with soft tissue tract extension involving and  blurring the margins of the right initial tuberosity which has irregular  cortical margins noted posteriorly extending around the right lateral  side concerning for osteomyelitis. Central areas of low signal or signal  void concerning for soft tissue emphysema. Midline soft tissue  irregularity overlying the distal sacrum has associated minimal fluid  tract concern for sacral decubitus ulceration and extension to the level  of the sacrococcygeal junction where there is central area of low signal  T1 and T2 hyperintense signal within the bone consistent with sacral  involvement of inflammation and osteomyelitis. Bilateral femoral heads  remain well located without large hip joint effusion, however,  contiguous soft tissue induration of the right hip in particular does  not exclude involvement given ischial tuberosity findings.       Impression:       Inflammatory process involving the right sacral and gluteal  region with midline sacral decubitus ulceration and minimal fluid  extending to the level of the sacrococcygeal angle where there is  abnormal bone marrow signal suggesting involvement of osteomyelitis.  However, more extensive process involving the right gluteal region  partially visualized with areas of low signal or signal void suggesting  soft tissue emphysema and extension to the cutaneous surface involving  the right ischial tuberosity which has blurred cortical margins again  suggesting osseous involvement of osteomyelitis. Confluent fluid signal  at this location concerning for phlegmon versus developing abscess  formation along with surrounding inflammatory findings of myositis and  cellulitis with asymmetric appearance compared to the left.      D:  02/04/2019  E:  02/04/2019     This report was finalized on 2/4/2019 12:48 PM by Dr. Hakan Flores.                  I have reviewed the medications:    Current  Facility-Administered Medications:   •  [START ON 2/7/2019] ! vanc trough 2/7 @ 1730, hold 1800 dose if trough >22, , Does not apply, Once, Sonya Anderson [facility]  •  acetaminophen (TYLENOL) tablet 650 mg, 650 mg, Oral, Q4H PRN, Roxanna Mehta MD  •  cholecalciferol (VITAMIN D3) tablet 2,000 Units, 2,000 Units, Oral, Daily, Roxanna Mehta MD, 2,000 Units at 02/05/19 0832  •  heparin (porcine) 5000 UNIT/ML injection 5,000 Units, 5,000 Units, Subcutaneous, Q8H, Roxanna Mehta MD, 5,000 Units at 02/05/19 0530  •  ketotifen (ZADITOR) 0.025 % ophthalmic solution 1 drop, 1 drop, Both Eyes, BID, Roxanna Mehta MD, 1 drop at 02/05/19 0833  •  Magnesium Sulfate 2 gram Bolus, followed by 8 gram infusion (total Mg dose 10 grams)- Mg less than or equal to 1mg/dL, 2 g, Intravenous, PRN **OR** Magnesium Sulfate 2 gram / 50mL Infusion (GIVE X 3 BAGS TO EQUAL 6GM TOTAL DOSE) - Mg 1.1 - 1.5 mg/dl, 2 g, Intravenous, PRN **OR** Magnesium Sulfate 4 gram infusion- Mg 1.6-1.9 mg/dL, 4 g, Intravenous, PRN, Roxanna Mehta MD  •  multivitamin with minerals 1 tablet, 1 tablet, Oral, Daily, Roxanna Mehta MD, 1 tablet at 02/05/19 0833  •  ondansetron (ZOFRAN) tablet 4 mg, 4 mg, Oral, Q6H PRN **OR** ondansetron (ZOFRAN) injection 4 mg, 4 mg, Intravenous, Q6H PRN, Roxanna Mehta MD  •  oxybutynin (DITROPAN) tablet 5 mg, 5 mg, Oral, TID, Roxanna Mehta MD, 5 mg at 02/05/19 0832  •  [DISCONTINUED] vancomycin 1750 mg/500 mL 0.9% NS IVPB ([facility]), 20 mg/kg, Intravenous, Once **AND** Pharmacy to dose vancomycin, , Does not apply, Continuous PRN, Roxanna Mehta MD  •  piperacillin-tazobactam (ZOSYN) 3.375 g in iso-osmotic dextrose 50 ml (premix), 3.375 g, Intravenous, Q8H, Roxanna Mehta MD, 3.375 g at 02/05/19 0530  •  potassium chloride (MICRO-K) CR capsule 40 mEq, 40 mEq, Oral, PRN **OR** potassium chloride (KLOR-CON) packet 40 mEq, 40 mEq, Oral, PRN **OR** potassium chloride 10 mEq in 100 mL IVPB, 10 mEq,  Intravenous, Q1H PRN, Roxanna Mehta MD  •  sennosides-docusate sodium (SENOKOT-S) 8.6-50 MG tablet 2 tablet, 2 tablet, Oral, Nightly, Roxanna Mehta MD, 2 tablet at 02/02/19 2039  •  [COMPLETED] Insert peripheral IV, , , Once **AND** sodium chloride 0.9 % flush 10 mL, 10 mL, Intravenous, PRN, Leydi Swift APRN  •  sodium chloride 0.9 % flush 10 mL, 10 mL, Intravenous, Q12H, Ivonne Tomlinson PA, 10 mL at 02/05/19 0834  •  sodium chloride 0.9 % flush 10 mL, 10 mL, Intravenous, PRN, Ivonne Tomlinson PA  •  sodium chloride 0.9 % flush 3 mL, 3 mL, Intravenous, Q12H, Roxanna Mehta MD, 3 mL at 02/04/19 2122  •  sodium chloride 0.9 % flush 3-10 mL, 3-10 mL, Intravenous, PRN, Roxanna Mehta MD  •  sodium chloride 0.9 % infusion, 100 mL/hr, Intravenous, Continuous, Roxanna Mehta MD, Last Rate: 100 mL/hr at 02/03/19 0802, 100 mL/hr at 02/03/19 0802  •  sodium hypochlorite (DAKIN'S) 0.025 % topical solution solution, , Topical, Q12H, Danilo Freitas MD  •  vancomycin 1500 mg/500 mL 0.9% NS IVPB ([facility]), 1,500 mg, Intravenous, Q24H, Sonya Anderson, [facility]  •  vitamin B-12 (CYANOCOBALAMIN) tablet 1,000 mcg, 1,000 mcg, Oral, Daily, Roxanna Mehta MD, 1,000 mcg at 02/05/19 0832      Assessment/Plan   Assessment / Plan     Active Hospital Problems    Diagnosis Date Noted   • Decubitus ulcer of sacral area [L89.159] 02/02/2019   • Cellulitis [L03.90] 02/02/2019   • Hyponatremia [E87.1] 02/02/2019   • Dysautonomia (CMS/HCC) [G90.1] 02/02/2019   • Cellulitis and abscess of buttock [L02.31, L03.317] 02/02/2019   • JUANA (acute kidney injury) (CMS/[facility]) [N17.9] 05/06/2017   • Paraplegia (CMS/[facility]) [G82.20] 05/31/2016          Brief Hospital Course to date:  Baudilio Toledo is a 58 y.o. male with paraplegia since 1983 presents with sacral ulcer infection, ARF, and hyponatremia.      Infected Sacral Ulcer with Cellulitis and probable osteomyelitis  - appreciate ID eval.  Continue Vanc/zosyn for now and await any  cultures.  - MRI suggests osteo at sacral-coggygeal angle and R ischial tuberosity  - may ultimately need transfer to  for debridment  - PICC placement  - await opinion o fPlastics.  May need second MRI for delineation of ischial involvement.      JUANA  - improving after hydration, renal following     Hyponatremia  -stable  on IVF  - renal following.  Continue saline.       UTI  - Patient reports that he develops dysautonomia when he has a UTI. Urine culture growing E. Coli. Susceptible to zosyn as above.  Per ID      Paraplegia with dysautonomia  - Complicates care. Will need air mattress to avoid additional ulcers. Reports that he developed an ulcer overnight while in hospital last.   - Requires self caths.        DVT Prophylaxis:  Sq heparin    Disposition: I expect the patient to be transferred to  when bed available.  Accepted to  by Dr. King (hospitalist) on 2/5    CODE STATUS:   Code Status and Medical Interventions:   Ordered at: 02/02/19 1351     Level Of Support Discussed With:    Patient     Code Status:    CPR     Medical Interventions (Level of Support Prior to Arrest):    Full         Electronically signed by Jael Beltrán MD, 02/05/19, 8:50 AM.

## 2019-02-05 NOTE — PROGRESS NOTES
"   LOS: 3 days    Patient Care Team:  Kevin Garcia DO as PCP - General (Family Medicine)        Subjective     Interval History:     No acute events overnight. No new complaints.   Review of Systems:   No CP or SOA    Objective     Vital Sign Min/Max for last 24 hours  Temp  Min: 97.9 °F (36.6 °C)  Max: 98.6 °F (37 °C)   BP  Min: 142/77  Max: 164/79   Pulse  Min: 59  Max: 84   Resp  Min: 16  Max: 18   SpO2  Min: 98 %  Max: 100 %   No Data Recorded   No Data Recorded     Flowsheet Rows      First Filed Value   Admission Height  180.3 cm (71\") Documented at 02/02/2019 1049   Admission Weight  81.6 kg (180 lb) Documented at 02/02/2019 1049          No intake/output data recorded.  I/O last 3 completed shifts:  In: 1010 [P.O.:960; IV Piggyback:50]  Out: 4350 [Urine:4350]    Physical Exam:     General Appearance:    Alert, cooperative, in no acute distress   Head:    Normocephalic, without obvious abnormality, atraumatic               Neck:   No adenopathy, supple, trachea midline, no thyromegaly, no   carotid bruit, no JVD       Lungs:     Clear to auscultation,respirations regular, even and                  unlabored    Heart:    Regular rhythm and normal rate, normal S1 and S2, no            murmur, no gallop, no rub, no click       Abdomen:     Normal bowel sounds, no masses, no organomegaly, soft        non-tender, non-distended, no guarding, no rebound                tenderness       Extremities:   no edema, no cyanosis, no redness               Neurologic:   Cranial nerves 2 - 12 grossly intact. Known quadraplegia.       WBC WBC   Date Value Ref Range Status   02/04/2019 7.18 3.50 - 10.80 10*3/mm3 Final   02/03/2019 9.35 3.50 - 10.80 10*3/mm3 Final   02/02/2019 15.86 (H) 3.50 - 10.80 10*3/mm3 Final      HGB Hemoglobin   Date Value Ref Range Status   02/04/2019 10.0 (L) 13.1 - 17.5 g/dL Final   02/03/2019 9.7 (L) 13.1 - 17.5 g/dL Final   02/02/2019 10.4 (L) 13.1 - 17.5 g/dL Final      HCT Hematocrit   Date " Value Ref Range Status   02/04/2019 29.9 (L) 38.9 - 50.9 % Final   02/03/2019 28.5 (L) 38.9 - 50.9 % Final   02/02/2019 30.2 (L) 38.9 - 50.9 % Final      Platlets No results found for: LABPLAT   MCV MCV   Date Value Ref Range Status   02/04/2019 94.6 80.0 - 99.0 fL Final   02/03/2019 95.0 80.0 - 99.0 fL Final   02/02/2019 94.1 80.0 - 99.0 fL Final          Sodium Sodium   Date Value Ref Range Status   02/05/2019 130 (L) 132 - 146 mmol/L Final   02/04/2019 125 (L) 132 - 146 mmol/L Final   02/03/2019 126 (L) 132 - 146 mmol/L Final   02/03/2019 124 (L) 132 - 146 mmol/L Final   02/03/2019 122 (L) 132 - 146 mmol/L Final   02/02/2019 122 (L) 132 - 146 mmol/L Final   02/02/2019 121 (L) 132 - 146 mmol/L Final      Potassium Potassium   Date Value Ref Range Status   02/05/2019 4.0 3.5 - 5.5 mmol/L Final   02/04/2019 3.7 3.5 - 5.5 mmol/L Final   02/03/2019 3.9 3.5 - 5.5 mmol/L Final   02/02/2019 3.9 3.5 - 5.5 mmol/L Final      Chloride Chloride   Date Value Ref Range Status   02/05/2019 99 99 - 109 mmol/L Final   02/04/2019 95 (L) 99 - 109 mmol/L Final   02/03/2019 95 (L) 99 - 109 mmol/L Final   02/02/2019 90 (L) 99 - 109 mmol/L Final      CO2 CO2   Date Value Ref Range Status   02/05/2019 24.0 20.0 - 31.0 mmol/L Final   02/04/2019 23.0 20.0 - 31.0 mmol/L Final   02/03/2019 23.0 20.0 - 31.0 mmol/L Final   02/02/2019 22.0 20.0 - 31.0 mmol/L Final      BUN BUN   Date Value Ref Range Status   02/05/2019 17 9 - 23 mg/dL Final   02/04/2019 22 9 - 23 mg/dL Final   02/03/2019 29 (H) 9 - 23 mg/dL Final   02/02/2019 38 (H) 9 - 23 mg/dL Final      Creatinine Creatinine   Date Value Ref Range Status   02/05/2019 0.80 0.60 - 1.30 mg/dL Final   02/04/2019 1.04 0.60 - 1.30 mg/dL Final   02/03/2019 1.19 0.60 - 1.30 mg/dL Final   02/02/2019 1.39 (H) 0.60 - 1.30 mg/dL Final      Calcium Calcium   Date Value Ref Range Status   02/05/2019 8.1 (L) 8.7 - 10.4 mg/dL Final   02/04/2019 8.6 (L) 8.7 - 10.4 mg/dL Final   02/03/2019 8.1 (L) 8.7 - 10.4  mg/dL Final   02/02/2019 8.6 (L) 8.7 - 10.4 mg/dL Final      PO4 No results found for: CAPO4   Albumin Albumin   Date Value Ref Range Status   02/04/2019 3.37 3.20 - 4.80 g/dL Final   02/03/2019 3.16 (L) 3.20 - 4.80 g/dL Final   02/02/2019 3.50 3.20 - 4.80 g/dL Final      Magnesium No results found for: MG   Uric Acid Uric Acid   Date Value Ref Range Status   02/03/2019 5.4 3.7 - 9.2 mg/dL Final     Comment:     Falsely depressed results may occur on samples drawn from patients receiving N-Acetylcysteine (NAC) or Metamizole.           Results Review:     I reviewed the patient's new clinical results.      [START ON 2/7/2019] Pharmacy Consult  Does not apply Once   cholecalciferol 2,000 Units Oral Daily   heparin (porcine) 5,000 Units Subcutaneous Q8H   ketotifen 1 drop Both Eyes BID   multivitamin with minerals 1 tablet Oral Daily   oxybutynin 5 mg Oral TID   piperacillin-tazobactam 3.375 g Intravenous Q8H   sennosides-docusate sodium 2 tablet Oral Nightly   sodium chloride 10 mL Intravenous Q12H   sodium chloride 3 mL Intravenous Q12H   sodium hypochlorite  Topical Q12H   vancomycin 1,500 mg Intravenous Q24H   vitamin B-12 1,000 mcg Oral Daily       Pharmacy to dose vancomycin     sodium chloride 100 mL/hr Last Rate: 100 mL/hr (02/03/19 0802)       Medication Review:     Assessment/Plan       Paraplegia (CMS/[facility])    JUANA (acute kidney injury) (CMS/[facility])    Decubitus ulcer of sacral area    Cellulitis    Hyponatremia    Dysautonomia (CMS/[facility])    Cellulitis and abscess of buttock      1- Hyponatremia - 130 -  improved.   2- JUANA - Pre-renal azotemia -  resolved.      Plan:-  - IV fluids.   - Adjust meds per renal function   - Avoid nephrotoxic agents.         I discussed the patients findings and my recommendations with patient and nursing staff    Micah Tipton MD  02/05/19  9:06 AM
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
## 2019-02-05 NOTE — PROGRESS NOTES
Continued Stay Note  [facility]     Patient Name: Baudilio Toledo  MRN: [id number]  Today's Date: 2/5/2019    Admit Date: 2/2/2019    Discharge Plan     Row Name 02/05/19 8729       Plan    Plan  [facility]    Patient/Family in Agreement with Plan  yes    Plan Comments  Per Dr. Beltrán in rounds and per MD note, patient has been accepted by Dr. King at [facility] for surgery and could have a bed tomorrow. Spoke with  Bed Placement, ph. [phone], and faxed facesheet to [phone]. Placed ambulance on will call with [facility] for tomorrow, 2/6/19 (ref #[id number]). PCS is on the chart. CM will update [facility] daily and keep ambulance on will call until patient has a bed and can transfer. CM will continue to follow.                                Row Name 02/05/19 2819       Plan    Plan  [facility]    Final Discharge Disposition Code  02 - short term hospital for  care        Discharge Codes    No documentation.       Expected Discharge Date and Time     Expected Discharge Date Expected Discharge Time    Feb 8, 2019             Jocelyn Tadeo
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
## 2019-02-05 NOTE — DISCHARGE PLACEMENT REQUEST
"Baudilio Toledo (58 y.o. Male)     From [place],   [phone]      Date of Birth Social Security Number Address Home Phone MRN    1960  [id number] [facility] [address] [phone] [id number]    [employer] Mar[employer] Status          None Single       Admission Date Admission Type Admitting Provider Attending Provider Department, Room/Bed    2/2/19 Emergency Jael Beltrán MD Mini, Jocelyn, MD [facility] 3E, S346/1    Discharge Date Discharge Disposition Discharge Destination                       Attending Provider:  Jael Beltrán MD    Allergies:  Sulfa Antibiotics    Isolation:  None   Infection:  None   Code Status:  CPR    Ht:  180.3 cm (71\")   Wt:  81.6 kg (180 lb)    Admission Cmt:  None   Principal Problem:  None                Active Insurance as of 2/2/2019     Primary Coverage     Payor Plan Insurance Group Employer/Plan Group    MEDICARE MEDICARE A & B      Payor Plan Address Payor Plan Phone Number Payor Plan Fax Number Effective Dates    [address] [phone]  11/1/1984 - None Entered    [facility] [address]       Subscriber Name Subscriber Birth Date Member ID       BAUDILIO TOLEDO 1960 [id number]           Secondary Coverage     Payor Plan Insurance Group Employer/Plan Group    ANTHEM BLUE CROSS ANTHEM BC  SUPP [id number]     Payor Plan Address Payor Plan Phone Number Payor Plan Fax Number Effective Dates    [address]   6/1/2003 - None Entered    [facility] [address]       Subscriber Name Subscriber Birth Date Member ID       BAUDILIO TOLEDO 1960 [id number]                 Emergency Contacts      (Rel.) Home Phone Work Phone Mobile Phone    EileenZora (Sister) [phone] -- --    Veronika Arellano (Relative) -- -- [phone]            Insurance Information                MEDICARE/MEDICARE A & B Phone: [phone]    Subscriber: Baudilio Toledo Subscriber#: [id number]    Group#:  Precert#:         GLORIA [employer] " SUPP Phone:     Subscriber: Baudilio Toledo Subscriber#: [id number]    Group#: [id number] Precert#:

## 2019-02-05 NOTE — PROGRESS NOTES
Discharge Planning Assessment  [facility]     Patient Name: Baudilio Toledo  MRN: [id number]  Today's Date: 2/5/2019    Admit Date: 2/2/2019    Discharge Needs Assessment    No documentation.       Discharge Plan     Row Name 02/05/19 4212       Plan    Plan  update    Plan Comments  Called [facility] [phone] to set up transport for pt to . According to Serene LOMAS, pt has a bed there and Dr. Beltrán wants to send him tonight.  set-up for  at 2015. Called Serene Castañeda back to relay this information and she will update floor charge nurse with new info. CM is unaware of room number over at .    Row Name 02/05/19 1523       Plan    Plan  [facility]    Patient/Family in Agreement with Plan  yes    Plan Comments  Per Dr. Beltrán in rounds and per MD note, patient has been accepted by Dr. King at [facility] for surgery and could have a bed tomorrow. Spoke with  Bed Placement, ph. [phone], and faxed facesheet to [phone]. Placed ambulance on will call with [facility] for tomorrow, 2/6/19 (ref #[id number]). PCS is on the chart. CM will update [facility] daily and keep ambulance on will call until patient has a bed and can transfer. CM will continue to follow.     Row Name 02/05/19 1527       Plan    Plan  [facility]    Patient/Family in Agreement with Plan  yes    Plan Comments  Spoke with  bed placement and faxed facesheet to [phone].  will notify the floor when a bed is available. CM will continue to follow.     Row Name 02/05/19 0310       Plan    Plan  [facility]    Final Discharge Disposition Code  02 - short term hospital for  care        Destination - Selection Complete      Service Provider Request Status Selected Services Address Phone Number Fax Number    [facility] [address] [facility] [address] [phone] --      Durable Medical Equipment      No service coordination in this encounter.      Dialysis/Infusion      No service coordination in this encounter.      Home Medical  Care      No service coordination in this encounter.      Community Resources      No service coordination in this encounter.        Expected Discharge Date and Time     Expected Discharge Date Expected Discharge Time    Feb 8, 2019         Demographic Summary    No documentation.       Functional Status    No documentation.       Psychosocial    No documentation.       Abuse/Neglect    No documentation.       Legal    No documentation.       Substance Abuse    No documentation.       Patient Forms    No documentation.           Kristin Schneider

## 2019-02-07 LAB
BACTERIA SPEC AEROBE CULT: NORMAL
BACTERIA SPEC AEROBE CULT: NORMAL

## 2019-02-19 ENCOUNTER — TRANSCRIBE ORDERS (OUTPATIENT)
Dept: LAB | Facility: HOSPITAL | Age: 59
End: 2019-02-19

## 2019-02-19 ENCOUNTER — LAB (OUTPATIENT)
Dept: LAB | Facility: HOSPITAL | Age: 59
End: 2019-02-19

## 2019-02-19 DIAGNOSIS — L03.317 CELLULITIS AND ABSCESS OF BUTTOCK: ICD-10-CM

## 2019-02-19 DIAGNOSIS — L02.31 CELLULITIS AND ABSCESS OF BUTTOCK: ICD-10-CM

## 2019-02-19 DIAGNOSIS — L02.31 CELLULITIS AND ABSCESS OF BUTTOCK: Primary | ICD-10-CM

## 2019-02-19 DIAGNOSIS — L03.317 CELLULITIS AND ABSCESS OF BUTTOCK: Primary | ICD-10-CM

## 2019-02-19 LAB
ALBUMIN SERPL-MCNC: 3.57 G/DL (ref 3.2–4.8)
ALBUMIN/GLOB SERPL: 1.6 G/DL (ref 1.5–2.5)
ALP SERPL-CCNC: 46 U/L (ref 25–100)
ALT SERPL W P-5'-P-CCNC: 12 U/L (ref 7–40)
ANION GAP SERPL CALCULATED.3IONS-SCNC: 6 MMOL/L (ref 3–11)
AST SERPL-CCNC: 15 U/L (ref 0–33)
BASOPHILS # BLD AUTO: 0.09 10*3/MM3 (ref 0–0.2)
BASOPHILS NFR BLD AUTO: 1.1 % (ref 0–1)
BILIRUB SERPL-MCNC: 0.2 MG/DL (ref 0.3–1.2)
BUN BLD-MCNC: 12 MG/DL (ref 9–23)
BUN/CREAT SERPL: 23.5 (ref 7–25)
CALCIUM SPEC-SCNC: 9.2 MG/DL (ref 8.7–10.4)
CHLORIDE SERPL-SCNC: 101 MMOL/L (ref 99–109)
CO2 SERPL-SCNC: 28 MMOL/L (ref 20–31)
CREAT BLD-MCNC: 0.51 MG/DL (ref 0.6–1.3)
CRP SERPL-MCNC: 2.61 MG/DL (ref 0–1)
DEPRECATED RDW RBC AUTO: 49.8 FL (ref 37–54)
EOSINOPHIL # BLD AUTO: 0.36 10*3/MM3 (ref 0–0.3)
EOSINOPHIL NFR BLD AUTO: 4.5 % (ref 0–3)
ERYTHROCYTE [DISTWIDTH] IN BLOOD BY AUTOMATED COUNT: 14 % (ref 11.3–14.5)
ERYTHROCYTE [SEDIMENTATION RATE] IN BLOOD: 78 MM/HR (ref 0–20)
GFR SERPL CREATININE-BSD FRML MDRD: >150 ML/MIN/1.73
GLOBULIN UR ELPH-MCNC: 2.2 GM/DL
GLUCOSE BLD-MCNC: 109 MG/DL (ref 70–100)
HCT VFR BLD AUTO: 32.1 % (ref 38.9–50.9)
HGB BLD-MCNC: 10.6 G/DL (ref 13.1–17.5)
IMM GRANULOCYTES # BLD AUTO: 0.01 10*3/MM3 (ref 0–0.05)
IMM GRANULOCYTES NFR BLD AUTO: 0.1 % (ref 0–0.6)
LYMPHOCYTES # BLD AUTO: 1.42 10*3/MM3 (ref 0.6–4.8)
LYMPHOCYTES NFR BLD AUTO: 17.9 % (ref 24–44)
MCH RBC QN AUTO: 32 PG (ref 27–31)
MCHC RBC AUTO-ENTMCNC: 33 G/DL (ref 32–36)
MCV RBC AUTO: 97 FL (ref 80–99)
MONOCYTES # BLD AUTO: 0.69 10*3/MM3 (ref 0–1)
MONOCYTES NFR BLD AUTO: 8.7 % (ref 0–12)
NEUTROPHILS # BLD AUTO: 5.38 10*3/MM3 (ref 1.5–8.3)
NEUTROPHILS NFR BLD AUTO: 67.7 % (ref 41–71)
PLATELET # BLD AUTO: 397 10*3/MM3 (ref 150–450)
PMV BLD AUTO: 8.6 FL (ref 6–12)
POTASSIUM BLD-SCNC: 4.2 MMOL/L (ref 3.5–5.5)
PROT SERPL-MCNC: 5.8 G/DL (ref 5.7–8.2)
RBC # BLD AUTO: 3.31 10*6/MM3 (ref 4.2–5.76)
SODIUM BLD-SCNC: 135 MMOL/L (ref 132–146)
WBC NRBC COR # BLD: 7.95 10*3/MM3 (ref 3.5–10.8)

## 2019-02-19 PROCEDURE — 86140 C-REACTIVE PROTEIN: CPT

## 2019-02-19 PROCEDURE — 85652 RBC SED RATE AUTOMATED: CPT

## 2019-02-19 PROCEDURE — 80053 COMPREHEN METABOLIC PANEL: CPT

## 2019-02-19 PROCEDURE — 85025 COMPLETE CBC W/AUTO DIFF WBC: CPT

## 2019-02-19 PROCEDURE — 36415 COLL VENOUS BLD VENIPUNCTURE: CPT

## 2019-02-26 ENCOUNTER — TELEPHONE (OUTPATIENT)
Dept: FAMILY MEDICINE CLINIC | Facility: CLINIC | Age: 59
End: 2019-02-26

## 2019-02-26 ENCOUNTER — LAB REQUISITION (OUTPATIENT)
Dept: LAB | Facility: HOSPITAL | Age: 59
End: 2019-02-26

## 2019-02-26 DIAGNOSIS — M86.9 OSTEOMYELITIS (HCC): ICD-10-CM

## 2019-02-26 LAB
ALBUMIN SERPL-MCNC: 3.73 G/DL (ref 3.2–4.8)
ALBUMIN/GLOB SERPL: 1.6 G/DL (ref 1.5–2.5)
ALP SERPL-CCNC: 37 U/L (ref 25–100)
ALT SERPL W P-5'-P-CCNC: 13 U/L (ref 7–40)
ANION GAP SERPL CALCULATED.3IONS-SCNC: 10 MMOL/L (ref 3–11)
AST SERPL-CCNC: 20 U/L (ref 0–33)
BASOPHILS # BLD AUTO: 0.05 10*3/MM3 (ref 0–0.2)
BASOPHILS NFR BLD AUTO: 1.1 % (ref 0–1)
BILIRUB SERPL-MCNC: 0.2 MG/DL (ref 0.3–1.2)
BUN BLD-MCNC: 10 MG/DL (ref 9–23)
BUN/CREAT SERPL: 20.4 (ref 7–25)
CALCIUM SPEC-SCNC: 9.1 MG/DL (ref 8.7–10.4)
CHLORIDE SERPL-SCNC: 98 MMOL/L (ref 99–109)
CO2 SERPL-SCNC: 23 MMOL/L (ref 20–31)
CREAT BLD-MCNC: 0.49 MG/DL (ref 0.6–1.3)
CRP SERPL-MCNC: 1 MG/DL (ref 0–1)
DEPRECATED RDW RBC AUTO: 49.6 FL (ref 37–54)
EOSINOPHIL # BLD AUTO: 0.22 10*3/MM3 (ref 0–0.3)
EOSINOPHIL NFR BLD AUTO: 4.9 % (ref 0–3)
ERYTHROCYTE [DISTWIDTH] IN BLOOD BY AUTOMATED COUNT: 13.8 % (ref 11.3–14.5)
ERYTHROCYTE [SEDIMENTATION RATE] IN BLOOD: 55 MM/HR (ref 0–20)
GFR SERPL CREATININE-BSD FRML MDRD: >150 ML/MIN/1.73
GLOBULIN UR ELPH-MCNC: 2.4 GM/DL
GLUCOSE BLD-MCNC: 94 MG/DL (ref 70–100)
HCT VFR BLD AUTO: 29.3 % (ref 38.9–50.9)
HGB BLD-MCNC: 9.5 G/DL (ref 13.1–17.5)
IMM GRANULOCYTES # BLD AUTO: 0 10*3/MM3 (ref 0–0.05)
IMM GRANULOCYTES NFR BLD AUTO: 0 % (ref 0–0.6)
LYMPHOCYTES # BLD AUTO: 1.05 10*3/MM3 (ref 0.6–4.8)
LYMPHOCYTES NFR BLD AUTO: 23.3 % (ref 24–44)
MCH RBC QN AUTO: 31.6 PG (ref 27–31)
MCHC RBC AUTO-ENTMCNC: 32.4 G/DL (ref 32–36)
MCV RBC AUTO: 97.3 FL (ref 80–99)
MONOCYTES # BLD AUTO: 0.47 10*3/MM3 (ref 0–1)
MONOCYTES NFR BLD AUTO: 10.4 % (ref 0–12)
NEUTROPHILS # BLD AUTO: 2.71 10*3/MM3 (ref 1.5–8.3)
NEUTROPHILS NFR BLD AUTO: 60.3 % (ref 41–71)
PLATELET # BLD AUTO: 217 10*3/MM3 (ref 150–450)
PMV BLD AUTO: 9.5 FL (ref 6–12)
POTASSIUM BLD-SCNC: 3.8 MMOL/L (ref 3.5–5.5)
PROT SERPL-MCNC: 6.1 G/DL (ref 5.7–8.2)
RBC # BLD AUTO: 3.01 10*6/MM3 (ref 4.2–5.76)
SODIUM BLD-SCNC: 131 MMOL/L (ref 132–146)
WBC NRBC COR # BLD: 4.5 10*3/MM3 (ref 3.5–10.8)

## 2019-02-26 PROCEDURE — 86140 C-REACTIVE PROTEIN: CPT | Performed by: INTERNAL MEDICINE

## 2019-02-26 PROCEDURE — 80053 COMPREHEN METABOLIC PANEL: CPT | Performed by: INTERNAL MEDICINE

## 2019-02-26 PROCEDURE — 85025 COMPLETE CBC W/AUTO DIFF WBC: CPT | Performed by: INTERNAL MEDICINE

## 2019-03-05 ENCOUNTER — TRANSCRIBE ORDERS (OUTPATIENT)
Dept: LAB | Facility: HOSPITAL | Age: 59
End: 2019-03-05

## 2019-03-05 ENCOUNTER — LAB (OUTPATIENT)
Dept: LAB | Facility: HOSPITAL | Age: 59
End: 2019-03-05

## 2019-03-05 DIAGNOSIS — L02.31 CELLULITIS AND ABSCESS OF BUTTOCK: Primary | ICD-10-CM

## 2019-03-05 DIAGNOSIS — L02.31 CELLULITIS AND ABSCESS OF BUTTOCK: ICD-10-CM

## 2019-03-05 DIAGNOSIS — L03.317 CELLULITIS AND ABSCESS OF BUTTOCK: Primary | ICD-10-CM

## 2019-03-05 DIAGNOSIS — L03.317 CELLULITIS AND ABSCESS OF BUTTOCK: ICD-10-CM

## 2019-03-05 LAB
ALBUMIN SERPL-MCNC: 3.85 G/DL (ref 3.2–4.8)
ALBUMIN/GLOB SERPL: 1.6 G/DL (ref 1.5–2.5)
ALP SERPL-CCNC: 52 U/L (ref 25–100)
ALT SERPL W P-5'-P-CCNC: 17 U/L (ref 7–40)
ANION GAP SERPL CALCULATED.3IONS-SCNC: 9 MMOL/L (ref 3–11)
AST SERPL-CCNC: 25 U/L (ref 0–33)
BASOPHILS # BLD AUTO: 0.06 10*3/MM3 (ref 0–0.2)
BASOPHILS NFR BLD AUTO: 0.8 % (ref 0–1)
BILIRUB SERPL-MCNC: 0.2 MG/DL (ref 0.3–1.2)
BUN BLD-MCNC: 13 MG/DL (ref 9–23)
BUN/CREAT SERPL: 26 (ref 7–25)
CALCIUM SPEC-SCNC: 8.8 MG/DL (ref 8.7–10.4)
CHLORIDE SERPL-SCNC: 101 MMOL/L (ref 99–109)
CO2 SERPL-SCNC: 24 MMOL/L (ref 20–31)
CREAT BLD-MCNC: 0.5 MG/DL (ref 0.6–1.3)
CRP SERPL-MCNC: 0.61 MG/DL (ref 0–1)
DEPRECATED RDW RBC AUTO: 50.8 FL (ref 37–54)
EOSINOPHIL # BLD AUTO: 0.26 10*3/MM3 (ref 0–0.3)
EOSINOPHIL NFR BLD AUTO: 3.6 % (ref 0–3)
ERYTHROCYTE [DISTWIDTH] IN BLOOD BY AUTOMATED COUNT: 14.4 % (ref 11.3–14.5)
ERYTHROCYTE [SEDIMENTATION RATE] IN BLOOD: 42 MM/HR (ref 0–20)
GFR SERPL CREATININE-BSD FRML MDRD: >150 ML/MIN/1.73
GLOBULIN UR ELPH-MCNC: 2.4 GM/DL
GLUCOSE BLD-MCNC: 135 MG/DL (ref 70–100)
HCT VFR BLD AUTO: 30.2 % (ref 38.9–50.9)
HGB BLD-MCNC: 9.7 G/DL (ref 13.1–17.5)
IMM GRANULOCYTES # BLD AUTO: 0.02 10*3/MM3 (ref 0–0.05)
IMM GRANULOCYTES NFR BLD AUTO: 0.3 % (ref 0–0.6)
LYMPHOCYTES # BLD AUTO: 1.44 10*3/MM3 (ref 0.6–4.8)
LYMPHOCYTES NFR BLD AUTO: 19.7 % (ref 24–44)
MCH RBC QN AUTO: 31.2 PG (ref 27–31)
MCHC RBC AUTO-ENTMCNC: 32.1 G/DL (ref 32–36)
MCV RBC AUTO: 97.1 FL (ref 80–99)
MONOCYTES # BLD AUTO: 0.94 10*3/MM3 (ref 0–1)
MONOCYTES NFR BLD AUTO: 12.8 % (ref 0–12)
NEUTROPHILS # BLD AUTO: 4.62 10*3/MM3 (ref 1.5–8.3)
NEUTROPHILS NFR BLD AUTO: 63.1 % (ref 41–71)
NRBC BLD AUTO-RTO: 0 /100 WBC (ref 0–0)
PLATELET # BLD AUTO: 200 10*3/MM3 (ref 150–450)
PMV BLD AUTO: 10.6 FL (ref 6–12)
POTASSIUM BLD-SCNC: 4.1 MMOL/L (ref 3.5–5.5)
PROT SERPL-MCNC: 6.2 G/DL (ref 5.7–8.2)
RBC # BLD AUTO: 3.11 10*6/MM3 (ref 4.2–5.76)
SODIUM BLD-SCNC: 134 MMOL/L (ref 132–146)
WBC NRBC COR # BLD: 7.32 10*3/MM3 (ref 3.5–10.8)

## 2019-03-05 PROCEDURE — 36415 COLL VENOUS BLD VENIPUNCTURE: CPT | Performed by: INTERNAL MEDICINE

## 2019-03-05 PROCEDURE — 85652 RBC SED RATE AUTOMATED: CPT

## 2019-03-05 PROCEDURE — 86140 C-REACTIVE PROTEIN: CPT | Performed by: INTERNAL MEDICINE

## 2019-03-05 PROCEDURE — 80053 COMPREHEN METABOLIC PANEL: CPT

## 2019-03-05 PROCEDURE — 85025 COMPLETE CBC W/AUTO DIFF WBC: CPT

## 2019-03-12 ENCOUNTER — LAB REQUISITION (OUTPATIENT)
Dept: URGENT CARE | Facility: CLINIC | Age: 59
End: 2019-03-12

## 2019-03-12 DIAGNOSIS — M86.9 OSTEOMYELITIS (HCC): ICD-10-CM

## 2019-03-12 LAB
ALBUMIN SERPL-MCNC: 3.59 G/DL (ref 3.2–4.8)
ALBUMIN/GLOB SERPL: 1.6 G/DL (ref 1.5–2.5)
ALP SERPL-CCNC: 42 U/L (ref 25–100)
ALT SERPL W P-5'-P-CCNC: 12 U/L (ref 7–40)
ANION GAP SERPL CALCULATED.3IONS-SCNC: 7 MMOL/L (ref 3–11)
AST SERPL-CCNC: 16 U/L (ref 0–33)
BASOPHILS # BLD AUTO: 0.02 10*3/MM3 (ref 0–0.2)
BASOPHILS NFR BLD AUTO: 0.3 % (ref 0–1)
BILIRUB SERPL-MCNC: 0.2 MG/DL (ref 0.3–1.2)
BUN BLD-MCNC: 13 MG/DL (ref 9–23)
BUN/CREAT SERPL: 28.3 (ref 7–25)
CALCIUM SPEC-SCNC: 8.8 MG/DL (ref 8.7–10.4)
CHLORIDE SERPL-SCNC: 101 MMOL/L (ref 99–109)
CO2 SERPL-SCNC: 24 MMOL/L (ref 20–31)
CREAT BLD-MCNC: 0.46 MG/DL (ref 0.6–1.3)
CRP SERPL-MCNC: 0.88 MG/DL (ref 0–1)
DEPRECATED RDW RBC AUTO: 49 FL (ref 37–54)
EOSINOPHIL # BLD AUTO: 0.15 10*3/MM3 (ref 0–0.3)
EOSINOPHIL NFR BLD AUTO: 2.5 % (ref 0–3)
ERYTHROCYTE [DISTWIDTH] IN BLOOD BY AUTOMATED COUNT: 14.2 % (ref 11.3–14.5)
ERYTHROCYTE [SEDIMENTATION RATE] IN BLOOD: 24 MM/HR (ref 0–20)
GFR SERPL CREATININE-BSD FRML MDRD: >150 ML/MIN/1.73
GLOBULIN UR ELPH-MCNC: 2.3 GM/DL
GLUCOSE BLD-MCNC: 115 MG/DL (ref 70–100)
HCT VFR BLD AUTO: 24.1 % (ref 38.9–50.9)
HGB BLD-MCNC: 8.1 G/DL (ref 13.1–17.5)
IMM GRANULOCYTES # BLD AUTO: 0.02 10*3/MM3 (ref 0–0.05)
IMM GRANULOCYTES NFR BLD AUTO: 0.3 % (ref 0–0.6)
LYMPHOCYTES # BLD AUTO: 0.86 10*3/MM3 (ref 0.6–4.8)
LYMPHOCYTES NFR BLD AUTO: 14.3 % (ref 24–44)
MCH RBC QN AUTO: 31.6 PG (ref 27–31)
MCHC RBC AUTO-ENTMCNC: 33.6 G/DL (ref 32–36)
MCV RBC AUTO: 94.1 FL (ref 80–99)
MONOCYTES # BLD AUTO: 0.78 10*3/MM3 (ref 0–1)
MONOCYTES NFR BLD AUTO: 13 % (ref 0–12)
NEUTROPHILS # BLD AUTO: 4.18 10*3/MM3 (ref 1.5–8.3)
NEUTROPHILS NFR BLD AUTO: 69.6 % (ref 41–71)
PLATELET # BLD AUTO: 180 10*3/MM3 (ref 150–450)
PMV BLD AUTO: 9.8 FL (ref 6–12)
POTASSIUM BLD-SCNC: 4 MMOL/L (ref 3.5–5.5)
PROT SERPL-MCNC: 5.9 G/DL (ref 5.7–8.2)
RBC # BLD AUTO: 2.56 10*6/MM3 (ref 4.2–5.76)
SODIUM BLD-SCNC: 132 MMOL/L (ref 132–146)
WBC NRBC COR # BLD: 6.01 10*3/MM3 (ref 3.5–10.8)

## 2019-03-12 PROCEDURE — 85652 RBC SED RATE AUTOMATED: CPT | Performed by: INTERNAL MEDICINE

## 2019-03-12 PROCEDURE — 80053 COMPREHEN METABOLIC PANEL: CPT | Performed by: INTERNAL MEDICINE

## 2019-03-12 PROCEDURE — 86140 C-REACTIVE PROTEIN: CPT | Performed by: INTERNAL MEDICINE

## 2019-03-12 PROCEDURE — 85025 COMPLETE CBC W/AUTO DIFF WBC: CPT | Performed by: INTERNAL MEDICINE

## 2019-03-19 ENCOUNTER — LAB (OUTPATIENT)
Dept: LAB | Facility: HOSPITAL | Age: 59
End: 2019-03-19

## 2019-03-19 ENCOUNTER — TRANSCRIBE ORDERS (OUTPATIENT)
Dept: LAB | Facility: HOSPITAL | Age: 59
End: 2019-03-19

## 2019-03-19 DIAGNOSIS — L02.31 CELLULITIS AND ABSCESS OF BUTTOCK: ICD-10-CM

## 2019-03-19 DIAGNOSIS — L02.31 CELLULITIS AND ABSCESS OF BUTTOCK: Primary | ICD-10-CM

## 2019-03-19 DIAGNOSIS — L03.317 CELLULITIS AND ABSCESS OF BUTTOCK: Primary | ICD-10-CM

## 2019-03-19 DIAGNOSIS — L03.317 CELLULITIS AND ABSCESS OF BUTTOCK: ICD-10-CM

## 2019-03-19 LAB
ALBUMIN SERPL-MCNC: 3.81 G/DL (ref 3.2–4.8)
ALBUMIN/GLOB SERPL: 1.7 G/DL (ref 1.5–2.5)
ALP SERPL-CCNC: 45 U/L (ref 25–100)
ALT SERPL W P-5'-P-CCNC: 14 U/L (ref 7–40)
ANION GAP SERPL CALCULATED.3IONS-SCNC: 11 MMOL/L (ref 3–11)
AST SERPL-CCNC: 18 U/L (ref 0–33)
BASOPHILS # BLD AUTO: 0.03 10*3/MM3 (ref 0–0.2)
BASOPHILS NFR BLD AUTO: 0.6 % (ref 0–1)
BILIRUB SERPL-MCNC: 0.4 MG/DL (ref 0.3–1.2)
BUN BLD-MCNC: 11 MG/DL (ref 9–23)
BUN/CREAT SERPL: 22 (ref 7–25)
CALCIUM SPEC-SCNC: 8.8 MG/DL (ref 8.7–10.4)
CHLORIDE SERPL-SCNC: 99 MMOL/L (ref 99–109)
CO2 SERPL-SCNC: 21 MMOL/L (ref 20–31)
CREAT BLD-MCNC: 0.5 MG/DL (ref 0.6–1.3)
CRP SERPL-MCNC: 0.46 MG/DL (ref 0–1)
DEPRECATED RDW RBC AUTO: 47.8 FL (ref 37–54)
EOSINOPHIL # BLD AUTO: 0.2 10*3/MM3 (ref 0–0.3)
EOSINOPHIL NFR BLD AUTO: 3.8 % (ref 0–3)
ERYTHROCYTE [DISTWIDTH] IN BLOOD BY AUTOMATED COUNT: 14.2 % (ref 11.3–14.5)
ERYTHROCYTE [SEDIMENTATION RATE] IN BLOOD: 33 MM/HR (ref 0–20)
GFR SERPL CREATININE-BSD FRML MDRD: >150 ML/MIN/1.73
GLOBULIN UR ELPH-MCNC: 2.2 GM/DL
GLUCOSE BLD-MCNC: 131 MG/DL (ref 70–100)
HCT VFR BLD AUTO: 23.8 % (ref 38.9–50.9)
HGB BLD-MCNC: 7.9 G/DL (ref 13.1–17.5)
IMM GRANULOCYTES # BLD AUTO: 0 10*3/MM3 (ref 0–0.05)
IMM GRANULOCYTES NFR BLD AUTO: 0 % (ref 0–0.6)
LYMPHOCYTES # BLD AUTO: 0.91 10*3/MM3 (ref 0.6–4.8)
LYMPHOCYTES NFR BLD AUTO: 17.4 % (ref 24–44)
MCH RBC QN AUTO: 30.7 PG (ref 27–31)
MCHC RBC AUTO-ENTMCNC: 33.2 G/DL (ref 32–36)
MCV RBC AUTO: 92.6 FL (ref 80–99)
MONOCYTES # BLD AUTO: 0.41 10*3/MM3 (ref 0–1)
MONOCYTES NFR BLD AUTO: 7.8 % (ref 0–12)
NEUTROPHILS # BLD AUTO: 3.69 10*3/MM3 (ref 1.5–8.3)
NEUTROPHILS NFR BLD AUTO: 70.4 % (ref 41–71)
PLATELET # BLD AUTO: 212 10*3/MM3 (ref 150–450)
PMV BLD AUTO: 9.4 FL (ref 6–12)
POTASSIUM BLD-SCNC: 4.2 MMOL/L (ref 3.5–5.5)
PROT SERPL-MCNC: 6 G/DL (ref 5.7–8.2)
RBC # BLD AUTO: 2.57 10*6/MM3 (ref 4.2–5.76)
SODIUM BLD-SCNC: 131 MMOL/L (ref 132–146)
WBC NRBC COR # BLD: 5.24 10*3/MM3 (ref 3.5–10.8)

## 2019-03-19 PROCEDURE — 86140 C-REACTIVE PROTEIN: CPT | Performed by: INTERNAL MEDICINE

## 2019-03-19 PROCEDURE — 80053 COMPREHEN METABOLIC PANEL: CPT

## 2019-03-19 PROCEDURE — 36415 COLL VENOUS BLD VENIPUNCTURE: CPT | Performed by: INTERNAL MEDICINE

## 2019-03-19 PROCEDURE — 85025 COMPLETE CBC W/AUTO DIFF WBC: CPT

## 2019-03-19 PROCEDURE — 85652 RBC SED RATE AUTOMATED: CPT

## 2019-03-22 ENCOUNTER — LAB (OUTPATIENT)
Dept: LAB | Facility: HOSPITAL | Age: 59
End: 2019-03-22

## 2019-03-22 ENCOUNTER — TRANSCRIBE ORDERS (OUTPATIENT)
Dept: LAB | Facility: HOSPITAL | Age: 59
End: 2019-03-22

## 2019-03-22 DIAGNOSIS — L70.9 SAPHO SYNDROME (HCC): ICD-10-CM

## 2019-03-22 DIAGNOSIS — K83.09 BACTERIAL CHOLANGITIS: ICD-10-CM

## 2019-03-22 DIAGNOSIS — M65.9 SAPHO SYNDROME (HCC): ICD-10-CM

## 2019-03-22 DIAGNOSIS — N39.0 ESCHERICHIA COLI URINARY TRACT INFECTION: Primary | ICD-10-CM

## 2019-03-22 DIAGNOSIS — L89.313 STAGE III PRESSURE ULCER OF RIGHT BUTTOCK (HCC): ICD-10-CM

## 2019-03-22 DIAGNOSIS — M86.9 SAPHO SYNDROME (HCC): ICD-10-CM

## 2019-03-22 DIAGNOSIS — B96.89 BACTERIAL CHOLANGITIS: ICD-10-CM

## 2019-03-22 DIAGNOSIS — L40.3 SAPHO SYNDROME (HCC): ICD-10-CM

## 2019-03-22 DIAGNOSIS — B96.20 ESCHERICHIA COLI URINARY TRACT INFECTION: ICD-10-CM

## 2019-03-22 DIAGNOSIS — M85.80 SAPHO SYNDROME (HCC): ICD-10-CM

## 2019-03-22 DIAGNOSIS — N39.0 ESCHERICHIA COLI URINARY TRACT INFECTION: ICD-10-CM

## 2019-03-22 DIAGNOSIS — B96.20 ESCHERICHIA COLI URINARY TRACT INFECTION: Primary | ICD-10-CM

## 2019-03-22 LAB — C DIFF TOX GENS STL QL NAA+PROBE: NOT DETECTED

## 2019-03-22 PROCEDURE — 87493 C DIFF AMPLIFIED PROBE: CPT

## 2019-03-25 ENCOUNTER — TRANSCRIBE ORDERS (OUTPATIENT)
Dept: ADMINISTRATIVE | Facility: HOSPITAL | Age: 59
End: 2019-03-25

## 2019-03-25 DIAGNOSIS — N39.0 URINARY TRACT INFECTION WITHOUT HEMATURIA, SITE UNSPECIFIED: Primary | ICD-10-CM

## 2019-04-01 ENCOUNTER — APPOINTMENT (OUTPATIENT)
Dept: CT IMAGING | Facility: HOSPITAL | Age: 59
End: 2019-04-01

## 2019-04-02 ENCOUNTER — LAB REQUISITION (OUTPATIENT)
Dept: LAB | Facility: HOSPITAL | Age: 59
End: 2019-04-02

## 2019-04-02 DIAGNOSIS — L89.313 STAGE III PRESSURE ULCER OF RIGHT BUTTOCK (HCC): ICD-10-CM

## 2019-04-02 LAB
ALBUMIN SERPL-MCNC: 3.67 G/DL (ref 3.2–4.8)
ALBUMIN/GLOB SERPL: 1.7 G/DL (ref 1.5–2.5)
ALP SERPL-CCNC: 114 U/L (ref 25–100)
ALT SERPL W P-5'-P-CCNC: 21 U/L (ref 7–40)
ANION GAP SERPL CALCULATED.3IONS-SCNC: 11 MMOL/L (ref 3–11)
AST SERPL-CCNC: 16 U/L (ref 0–33)
BASOPHILS # BLD AUTO: 0.07 10*3/MM3 (ref 0–0.2)
BASOPHILS NFR BLD AUTO: 0.7 % (ref 0–1)
BILIRUB SERPL-MCNC: 0.5 MG/DL (ref 0.3–1.2)
BUN BLD-MCNC: 9 MG/DL (ref 9–23)
BUN/CREAT SERPL: 23.7 (ref 7–25)
CALCIUM SPEC-SCNC: 8.5 MG/DL (ref 8.7–10.4)
CHLORIDE SERPL-SCNC: 104 MMOL/L (ref 99–109)
CO2 SERPL-SCNC: 23 MMOL/L (ref 20–31)
CREAT BLD-MCNC: 0.38 MG/DL (ref 0.6–1.3)
CRP SERPL-MCNC: 1.43 MG/DL (ref 0–1)
DEPRECATED RDW RBC AUTO: 63.1 FL (ref 37–54)
EOSINOPHIL # BLD AUTO: 0.29 10*3/MM3 (ref 0–0.3)
EOSINOPHIL NFR BLD AUTO: 3.1 % (ref 0–3)
ERYTHROCYTE [DISTWIDTH] IN BLOOD BY AUTOMATED COUNT: 17.9 % (ref 11.3–14.5)
ERYTHROCYTE [SEDIMENTATION RATE] IN BLOOD: 27 MM/HR (ref 0–20)
GFR SERPL CREATININE-BSD FRML MDRD: >150 ML/MIN/1.73
GLOBULIN UR ELPH-MCNC: 2.1 GM/DL
GLUCOSE BLD-MCNC: 98 MG/DL (ref 70–100)
HCT VFR BLD AUTO: 27.3 % (ref 38.9–50.9)
HGB BLD-MCNC: 8.8 G/DL (ref 13.1–17.5)
IMM GRANULOCYTES # BLD AUTO: 0.01 10*3/MM3 (ref 0–0.05)
IMM GRANULOCYTES NFR BLD AUTO: 0.1 % (ref 0–0.6)
LYMPHOCYTES # BLD AUTO: 1.13 10*3/MM3 (ref 0.6–4.8)
LYMPHOCYTES NFR BLD AUTO: 12.1 % (ref 24–44)
MCH RBC QN AUTO: 31.2 PG (ref 27–31)
MCHC RBC AUTO-ENTMCNC: 32.2 G/DL (ref 32–36)
MCV RBC AUTO: 96.8 FL (ref 80–99)
MONOCYTES # BLD AUTO: 0.67 10*3/MM3 (ref 0–1)
MONOCYTES NFR BLD AUTO: 7.2 % (ref 0–12)
NEUTROPHILS # BLD AUTO: 7.17 10*3/MM3 (ref 1.5–8.3)
NEUTROPHILS NFR BLD AUTO: 76.8 % (ref 41–71)
PLATELET # BLD AUTO: 451 10*3/MM3 (ref 150–450)
PMV BLD AUTO: 8.8 FL (ref 6–12)
POTASSIUM BLD-SCNC: 3.8 MMOL/L (ref 3.5–5.5)
PROT SERPL-MCNC: 5.8 G/DL (ref 5.7–8.2)
RBC # BLD AUTO: 2.82 10*6/MM3 (ref 4.2–5.76)
SODIUM BLD-SCNC: 138 MMOL/L (ref 132–146)
WBC NRBC COR # BLD: 9.34 10*3/MM3 (ref 3.5–10.8)

## 2019-04-02 PROCEDURE — 80053 COMPREHEN METABOLIC PANEL: CPT | Performed by: INTERNAL MEDICINE

## 2019-04-02 PROCEDURE — 85025 COMPLETE CBC W/AUTO DIFF WBC: CPT | Performed by: INTERNAL MEDICINE

## 2019-04-02 PROCEDURE — 85652 RBC SED RATE AUTOMATED: CPT | Performed by: INTERNAL MEDICINE

## 2019-04-02 PROCEDURE — 86140 C-REACTIVE PROTEIN: CPT | Performed by: INTERNAL MEDICINE

## 2019-04-03 ENCOUNTER — APPOINTMENT (OUTPATIENT)
Dept: CT IMAGING | Facility: HOSPITAL | Age: 59
End: 2019-04-03

## 2019-04-09 ENCOUNTER — TRANSCRIBE ORDERS (OUTPATIENT)
Dept: LAB | Facility: HOSPITAL | Age: 59
End: 2019-04-09

## 2019-04-09 ENCOUNTER — LAB (OUTPATIENT)
Dept: LAB | Facility: HOSPITAL | Age: 59
End: 2019-04-09

## 2019-04-09 DIAGNOSIS — L03.319 CELLULITIS OF FLANK: ICD-10-CM

## 2019-04-09 DIAGNOSIS — M46.28 ABSCESS OF SACRUM (HCC): ICD-10-CM

## 2019-04-09 DIAGNOSIS — L02.31 CELLULITIS AND ABSCESS OF BUTTOCK: ICD-10-CM

## 2019-04-09 DIAGNOSIS — R31.9 URINARY TRACT INFECTION WITH HEMATURIA, SITE UNSPECIFIED: Primary | ICD-10-CM

## 2019-04-09 DIAGNOSIS — L03.317 CELLULITIS AND ABSCESS OF BUTTOCK: ICD-10-CM

## 2019-04-09 DIAGNOSIS — N39.0 URINARY TRACT INFECTION WITH HEMATURIA, SITE UNSPECIFIED: ICD-10-CM

## 2019-04-09 DIAGNOSIS — B95.4 BACTERIAL INFECTION DUE TO STREPTOCOCCUS, GROUP G: ICD-10-CM

## 2019-04-09 DIAGNOSIS — G82.50 QUADRIPLEGIA, UNSPECIFIED (HCC): ICD-10-CM

## 2019-04-09 DIAGNOSIS — N39.0 URINARY TRACT INFECTION WITH HEMATURIA, SITE UNSPECIFIED: Primary | ICD-10-CM

## 2019-04-09 DIAGNOSIS — R31.9 URINARY TRACT INFECTION WITH HEMATURIA, SITE UNSPECIFIED: ICD-10-CM

## 2019-04-09 LAB
ALBUMIN SERPL-MCNC: 3.6 G/DL (ref 3.5–5.2)
ALBUMIN/GLOB SERPL: 1.1 G/DL
ALP SERPL-CCNC: 74 U/L (ref 39–117)
ALT SERPL W P-5'-P-CCNC: 10 U/L (ref 1–41)
ANION GAP SERPL CALCULATED.3IONS-SCNC: 11 MMOL/L
AST SERPL-CCNC: 12 U/L (ref 1–40)
BASOPHILS # BLD AUTO: 0.09 10*3/MM3 (ref 0–0.2)
BASOPHILS NFR BLD AUTO: 1.5 % (ref 0–1.5)
BILIRUB SERPL-MCNC: 0.2 MG/DL (ref 0.2–1.2)
BUN BLD-MCNC: 7 MG/DL (ref 8–23)
BUN/CREAT SERPL: 16.7 (ref 7–25)
CALCIUM SPEC-SCNC: 8.7 MG/DL (ref 8.6–10.5)
CHLORIDE SERPL-SCNC: 100 MMOL/L (ref 98–107)
CO2 SERPL-SCNC: 22 MMOL/L (ref 22–29)
CREAT BLD-MCNC: 0.42 MG/DL (ref 0.76–1.27)
CRP SERPL-MCNC: 2.23 MG/DL (ref 0–0.5)
DEPRECATED RDW RBC AUTO: 58.1 FL (ref 37–54)
EOSINOPHIL # BLD AUTO: 0.3 10*3/MM3 (ref 0–0.4)
EOSINOPHIL NFR BLD AUTO: 5 % (ref 0.3–6.2)
ERYTHROCYTE [DISTWIDTH] IN BLOOD BY AUTOMATED COUNT: 16.5 % (ref 12.3–15.4)
ERYTHROCYTE [SEDIMENTATION RATE] IN BLOOD: 49 MM/HR (ref 0–20)
GFR SERPL CREATININE-BSD FRML MDRD: >150 ML/MIN/1.73
GLOBULIN UR ELPH-MCNC: 3.4 GM/DL
GLUCOSE BLD-MCNC: 107 MG/DL (ref 65–99)
HCT VFR BLD AUTO: 31.2 % (ref 37.5–51)
HGB BLD-MCNC: 10.1 G/DL (ref 13–17.7)
IMM GRANULOCYTES # BLD AUTO: 0.01 10*3/MM3 (ref 0–0.05)
IMM GRANULOCYTES NFR BLD AUTO: 0.2 % (ref 0–0.5)
LYMPHOCYTES # BLD AUTO: 1.47 10*3/MM3 (ref 0.7–3.1)
LYMPHOCYTES NFR BLD AUTO: 24.3 % (ref 19.6–45.3)
MCH RBC QN AUTO: 31 PG (ref 26.6–33)
MCHC RBC AUTO-ENTMCNC: 32.4 G/DL (ref 31.5–35.7)
MCV RBC AUTO: 95.7 FL (ref 79–97)
MONOCYTES # BLD AUTO: 0.5 10*3/MM3 (ref 0.1–0.9)
MONOCYTES NFR BLD AUTO: 8.3 % (ref 5–12)
NEUTROPHILS # BLD AUTO: 3.69 10*3/MM3 (ref 1.4–7)
NEUTROPHILS NFR BLD AUTO: 60.7 % (ref 42.7–76)
PLATELET # BLD AUTO: 425 10*3/MM3 (ref 140–450)
PMV BLD AUTO: 8.9 FL (ref 6–12)
POTASSIUM BLD-SCNC: 4.1 MMOL/L (ref 3.5–5.2)
PROT SERPL-MCNC: 7 G/DL (ref 6–8.5)
RBC # BLD AUTO: 3.26 10*6/MM3 (ref 4.14–5.8)
SODIUM BLD-SCNC: 133 MMOL/L (ref 136–145)
WBC NRBC COR # BLD: 6.06 10*3/MM3 (ref 3.4–10.8)

## 2019-04-09 PROCEDURE — 85025 COMPLETE CBC W/AUTO DIFF WBC: CPT

## 2019-04-09 PROCEDURE — 86140 C-REACTIVE PROTEIN: CPT

## 2019-04-09 PROCEDURE — 80053 COMPREHEN METABOLIC PANEL: CPT

## 2019-04-09 PROCEDURE — 85652 RBC SED RATE AUTOMATED: CPT

## 2019-04-09 PROCEDURE — 36415 COLL VENOUS BLD VENIPUNCTURE: CPT

## 2019-04-10 ENCOUNTER — APPOINTMENT (OUTPATIENT)
Dept: CT IMAGING | Facility: HOSPITAL | Age: 59
End: 2019-04-10

## 2019-04-16 ENCOUNTER — HOSPITAL ENCOUNTER (OUTPATIENT)
Dept: CT IMAGING | Facility: HOSPITAL | Age: 59
Discharge: HOME | End: 2019-04-16
Admitting: INTERNAL MEDICINE

## 2019-04-16 DIAGNOSIS — N39.0 URINARY TRACT INFECTION WITHOUT HEMATURIA, SITE UNSPECIFIED: ICD-10-CM

## 2019-04-16 PROCEDURE — 72192 CT PELVIS W/O DYE: CPT

## 2019-04-23 ENCOUNTER — LAB (OUTPATIENT)
Dept: LAB | Facility: HOSPITAL | Age: 59
End: 2019-04-23

## 2019-04-23 ENCOUNTER — TRANSCRIBE ORDERS (OUTPATIENT)
Dept: LAB | Facility: HOSPITAL | Age: 59
End: 2019-04-23

## 2019-04-23 DIAGNOSIS — L02.31 CELLULITIS AND ABSCESS OF BUTTOCK: ICD-10-CM

## 2019-04-23 DIAGNOSIS — L03.319 CELLULITIS OF FLANK: ICD-10-CM

## 2019-04-23 DIAGNOSIS — L03.317 CELLULITIS AND ABSCESS OF BUTTOCK: ICD-10-CM

## 2019-04-23 DIAGNOSIS — R19.7 DIARRHEA OF PRESUMED INFECTIOUS ORIGIN: ICD-10-CM

## 2019-04-23 DIAGNOSIS — M46.28 ABSCESS OF SACRUM (HCC): ICD-10-CM

## 2019-04-23 DIAGNOSIS — B96.29 NON-SHIGA TOXIN-PRODUCING E. COLI: ICD-10-CM

## 2019-04-23 DIAGNOSIS — R19.7 DIARRHEA OF PRESUMED INFECTIOUS ORIGIN: Primary | ICD-10-CM

## 2019-04-23 LAB — C DIFF TOX GENS STL QL NAA+PROBE: NOT DETECTED

## 2019-04-24 ENCOUNTER — TRANSCRIBE ORDERS (OUTPATIENT)
Dept: LAB | Facility: HOSPITAL | Age: 59
End: 2019-04-24

## 2019-04-24 ENCOUNTER — LAB (OUTPATIENT)
Dept: LAB | Facility: HOSPITAL | Age: 59
End: 2019-04-24

## 2019-04-24 DIAGNOSIS — M46.28 ABSCESS OF SACRUM (HCC): ICD-10-CM

## 2019-04-24 DIAGNOSIS — R19.7 DIARRHEA OF PRESUMED INFECTIOUS ORIGIN: Primary | ICD-10-CM

## 2019-04-24 DIAGNOSIS — B96.29 NON-SHIGA TOXIN-PRODUCING E. COLI: ICD-10-CM

## 2019-04-24 DIAGNOSIS — R19.7 DIARRHEA OF PRESUMED INFECTIOUS ORIGIN: ICD-10-CM

## 2019-04-24 LAB
ALBUMIN SERPL-MCNC: 3.7 G/DL (ref 3.5–5.2)
ALBUMIN/GLOB SERPL: 1.1 G/DL
ALP SERPL-CCNC: 64 U/L (ref 39–117)
ALT SERPL W P-5'-P-CCNC: 10 U/L (ref 1–41)
ANION GAP SERPL CALCULATED.3IONS-SCNC: 13 MMOL/L
AST SERPL-CCNC: 14 U/L (ref 1–40)
BASOPHILS # BLD AUTO: 0.09 10*3/MM3 (ref 0–0.2)
BASOPHILS NFR BLD AUTO: 1.2 % (ref 0–1.5)
BILIRUB SERPL-MCNC: 0.2 MG/DL (ref 0.2–1.2)
BUN BLD-MCNC: 3 MG/DL (ref 8–23)
BUN/CREAT SERPL: 10.3 (ref 7–25)
CALCIUM SPEC-SCNC: 9.3 MG/DL (ref 8.6–10.5)
CHLORIDE SERPL-SCNC: 99 MMOL/L (ref 98–107)
CO2 SERPL-SCNC: 23 MMOL/L (ref 22–29)
CREAT BLD-MCNC: 0.29 MG/DL (ref 0.76–1.27)
CRP SERPL-MCNC: 2.07 MG/DL (ref 0–0.5)
DEPRECATED RDW RBC AUTO: 55 FL (ref 37–54)
EOSINOPHIL # BLD AUTO: 0.47 10*3/MM3 (ref 0–0.4)
EOSINOPHIL NFR BLD AUTO: 6.2 % (ref 0.3–6.2)
ERYTHROCYTE [DISTWIDTH] IN BLOOD BY AUTOMATED COUNT: 15.7 % (ref 12.3–15.4)
ERYTHROCYTE [SEDIMENTATION RATE] IN BLOOD: 55 MM/HR (ref 0–20)
GFR SERPL CREATININE-BSD FRML MDRD: >150 ML/MIN/1.73
GLOBULIN UR ELPH-MCNC: 3.5 GM/DL
GLUCOSE BLD-MCNC: 109 MG/DL (ref 65–99)
HCT VFR BLD AUTO: 33.9 % (ref 37.5–51)
HGB BLD-MCNC: 10.8 G/DL (ref 13–17.7)
IMM GRANULOCYTES # BLD AUTO: 0.01 10*3/MM3 (ref 0–0.05)
IMM GRANULOCYTES NFR BLD AUTO: 0.1 % (ref 0–0.5)
LYMPHOCYTES # BLD AUTO: 1.43 10*3/MM3 (ref 0.7–3.1)
LYMPHOCYTES NFR BLD AUTO: 18.9 % (ref 19.6–45.3)
MCH RBC QN AUTO: 30.2 PG (ref 26.6–33)
MCHC RBC AUTO-ENTMCNC: 31.9 G/DL (ref 31.5–35.7)
MCV RBC AUTO: 94.7 FL (ref 79–97)
MONOCYTES # BLD AUTO: 0.63 10*3/MM3 (ref 0.1–0.9)
MONOCYTES NFR BLD AUTO: 8.3 % (ref 5–12)
NEUTROPHILS # BLD AUTO: 4.92 10*3/MM3 (ref 1.7–7)
NEUTROPHILS NFR BLD AUTO: 65.3 % (ref 42.7–76)
PLATELET # BLD AUTO: 389 10*3/MM3 (ref 140–450)
PMV BLD AUTO: 8.8 FL (ref 6–12)
POTASSIUM BLD-SCNC: 4.4 MMOL/L (ref 3.5–5.2)
PROT SERPL-MCNC: 7.2 G/DL (ref 6–8.5)
RBC # BLD AUTO: 3.58 10*6/MM3 (ref 4.14–5.8)
SODIUM BLD-SCNC: 135 MMOL/L (ref 136–145)
WBC NRBC COR # BLD: 7.55 10*3/MM3 (ref 3.4–10.8)

## 2019-04-24 PROCEDURE — 85025 COMPLETE CBC W/AUTO DIFF WBC: CPT

## 2019-04-24 PROCEDURE — 36415 COLL VENOUS BLD VENIPUNCTURE: CPT

## 2019-04-24 PROCEDURE — 86140 C-REACTIVE PROTEIN: CPT

## 2019-04-24 PROCEDURE — 80053 COMPREHEN METABOLIC PANEL: CPT

## 2019-05-14 ENCOUNTER — LAB (OUTPATIENT)
Dept: LAB | Facility: HOSPITAL | Age: 59
End: 2019-05-14

## 2019-05-14 ENCOUNTER — TRANSCRIBE ORDERS (OUTPATIENT)
Dept: LAB | Facility: HOSPITAL | Age: 59
End: 2019-05-14

## 2019-05-14 DIAGNOSIS — R19.7 DIARRHEA OF PRESUMED INFECTIOUS ORIGIN: Primary | ICD-10-CM

## 2019-05-14 DIAGNOSIS — N39.0 URINARY TRACT INFECTION WITHOUT HEMATURIA, SITE UNSPECIFIED: ICD-10-CM

## 2019-05-14 DIAGNOSIS — M46.28 CHRONIC OSTEOMYELITIS OF COCCYX (HCC): ICD-10-CM

## 2019-05-14 DIAGNOSIS — R19.7 DIARRHEA, UNSPECIFIED TYPE: Primary | ICD-10-CM

## 2019-05-14 DIAGNOSIS — L03.317 CELLULITIS AND ABSCESS OF BUTTOCK: ICD-10-CM

## 2019-05-14 DIAGNOSIS — B96.29 NON-SHIGA TOXIN-PRODUCING E. COLI: ICD-10-CM

## 2019-05-14 DIAGNOSIS — L02.31 CELLULITIS AND ABSCESS OF BUTTOCK: ICD-10-CM

## 2019-05-14 DIAGNOSIS — M46.28 ABSCESS OF SACRUM (HCC): ICD-10-CM

## 2019-05-14 LAB
ALBUMIN SERPL-MCNC: 3.7 G/DL (ref 3.5–5.2)
ALBUMIN/GLOB SERPL: 1.1 G/DL
ALP SERPL-CCNC: 48 U/L (ref 39–117)
ALT SERPL W P-5'-P-CCNC: 8 U/L (ref 1–41)
ANION GAP SERPL CALCULATED.3IONS-SCNC: 11 MMOL/L
AST SERPL-CCNC: 13 U/L (ref 1–40)
BASOPHILS # BLD AUTO: 0.1 10*3/MM3 (ref 0–0.2)
BASOPHILS NFR BLD AUTO: 1.6 % (ref 0–1.5)
BILIRUB SERPL-MCNC: 0.3 MG/DL (ref 0.2–1.2)
BUN BLD-MCNC: 7 MG/DL (ref 8–23)
BUN/CREAT SERPL: 20.6 (ref 7–25)
CALCIUM SPEC-SCNC: 8.8 MG/DL (ref 8.6–10.5)
CHLORIDE SERPL-SCNC: 99 MMOL/L (ref 98–107)
CO2 SERPL-SCNC: 23 MMOL/L (ref 22–29)
CREAT BLD-MCNC: 0.34 MG/DL (ref 0.76–1.27)
CRP SERPL-MCNC: 0.85 MG/DL (ref 0–0.5)
DEPRECATED RDW RBC AUTO: 54 FL (ref 37–54)
EOSINOPHIL # BLD AUTO: 0.28 10*3/MM3 (ref 0–0.4)
EOSINOPHIL NFR BLD AUTO: 4.4 % (ref 0.3–6.2)
ERYTHROCYTE [DISTWIDTH] IN BLOOD BY AUTOMATED COUNT: 15.8 % (ref 12.3–15.4)
ERYTHROCYTE [SEDIMENTATION RATE] IN BLOOD: 29 MM/HR (ref 0–20)
GFR SERPL CREATININE-BSD FRML MDRD: >150 ML/MIN/1.73
GLOBULIN UR ELPH-MCNC: 3.3 GM/DL
GLUCOSE BLD-MCNC: 123 MG/DL (ref 65–99)
HCT VFR BLD AUTO: 34.5 % (ref 37.5–51)
HGB BLD-MCNC: 11.3 G/DL (ref 13–17.7)
IMM GRANULOCYTES # BLD AUTO: 0.01 10*3/MM3 (ref 0–0.05)
IMM GRANULOCYTES NFR BLD AUTO: 0.2 % (ref 0–0.5)
LYMPHOCYTES # BLD AUTO: 1.8 10*3/MM3 (ref 0.7–3.1)
LYMPHOCYTES NFR BLD AUTO: 28.4 % (ref 19.6–45.3)
MCH RBC QN AUTO: 30.3 PG (ref 26.6–33)
MCHC RBC AUTO-ENTMCNC: 32.8 G/DL (ref 31.5–35.7)
MCV RBC AUTO: 92.5 FL (ref 79–97)
MONOCYTES # BLD AUTO: 0.5 10*3/MM3 (ref 0.1–0.9)
MONOCYTES NFR BLD AUTO: 7.9 % (ref 5–12)
NEUTROPHILS # BLD AUTO: 3.64 10*3/MM3 (ref 1.7–7)
NEUTROPHILS NFR BLD AUTO: 57.5 % (ref 42.7–76)
PLATELET # BLD AUTO: 327 10*3/MM3 (ref 140–450)
PMV BLD AUTO: 8.7 FL (ref 6–12)
POTASSIUM BLD-SCNC: 4 MMOL/L (ref 3.5–5.2)
PROT SERPL-MCNC: 7 G/DL (ref 6–8.5)
RBC # BLD AUTO: 3.73 10*6/MM3 (ref 4.14–5.8)
SODIUM BLD-SCNC: 133 MMOL/L (ref 136–145)
WBC NRBC COR # BLD: 6.33 10*3/MM3 (ref 3.4–10.8)

## 2019-05-14 PROCEDURE — 86140 C-REACTIVE PROTEIN: CPT

## 2019-05-14 PROCEDURE — 36415 COLL VENOUS BLD VENIPUNCTURE: CPT

## 2019-05-14 PROCEDURE — 85025 COMPLETE CBC W/AUTO DIFF WBC: CPT

## 2019-05-14 PROCEDURE — 80053 COMPREHEN METABOLIC PANEL: CPT

## 2019-05-14 PROCEDURE — 85652 RBC SED RATE AUTOMATED: CPT

## 2019-05-29 ENCOUNTER — OFFICE VISIT (OUTPATIENT)
Dept: GASTROENTEROLOGY | Facility: CLINIC | Age: 59
End: 2019-05-29

## 2019-05-29 VITALS
DIASTOLIC BLOOD PRESSURE: 65 MMHG | BODY MASS INDEX: 25.1 KG/M2 | WEIGHT: 180 LBS | HEART RATE: 70 BPM | SYSTOLIC BLOOD PRESSURE: 90 MMHG

## 2019-05-29 DIAGNOSIS — Z78.9 AT AVERAGE RISK FOR COLON CANCER: ICD-10-CM

## 2019-05-29 DIAGNOSIS — R19.7 DIARRHEA, UNSPECIFIED TYPE: Primary | ICD-10-CM

## 2019-05-29 PROCEDURE — 99214 OFFICE O/P EST MOD 30 MIN: CPT | Performed by: NURSE PRACTITIONER

## 2019-05-29 RX ORDER — NITROFURANTOIN MACROCRYSTALS 50 MG/1
CAPSULE ORAL
Refills: 11 | COMMUNITY
Start: 2019-03-18
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
## 2020-02-04 ENCOUNTER — APPOINTMENT (OUTPATIENT)
Dept: LAB | Facility: HOSPITAL | Age: 60
End: 2020-02-04

## 2020-02-04 ENCOUNTER — OFFICE VISIT (OUTPATIENT)
Dept: FAMILY MEDICINE CLINIC | Facility: CLINIC | Age: 60
End: 2020-02-04

## 2020-02-04 VITALS
BODY MASS INDEX: 25.12 KG/M2 | OXYGEN SATURATION: 99 % | HEIGHT: 71 IN | HEART RATE: 67 BPM | DIASTOLIC BLOOD PRESSURE: 80 MMHG | SYSTOLIC BLOOD PRESSURE: 150 MMHG

## 2020-02-04 DIAGNOSIS — L03.317 CELLULITIS AND ABSCESS OF BUTTOCK: ICD-10-CM

## 2020-02-04 DIAGNOSIS — Z99.3: ICD-10-CM

## 2020-02-04 DIAGNOSIS — L02.31 CELLULITIS AND ABSCESS OF BUTTOCK: ICD-10-CM

## 2020-02-04 DIAGNOSIS — Z23 NEED FOR VACCINATION WITH 13-POLYVALENT PNEUMOCOCCAL CONJUGATE VACCINE: ICD-10-CM

## 2020-02-04 DIAGNOSIS — Z13.29 SCREENING FOR ENDOCRINE DISORDER: ICD-10-CM

## 2020-02-04 DIAGNOSIS — E53.8 COBALAMIN DEFICIENCY: ICD-10-CM

## 2020-02-04 DIAGNOSIS — N31.8 HYPERTONICITY OF BLADDER: ICD-10-CM

## 2020-02-04 DIAGNOSIS — Z12.5 SCREENING PSA (PROSTATE SPECIFIC ANTIGEN): ICD-10-CM

## 2020-02-04 DIAGNOSIS — Z00.00 PREVENTATIVE HEALTH CARE: Primary | ICD-10-CM

## 2020-02-04 DIAGNOSIS — F51.01 PRIMARY INSOMNIA: ICD-10-CM

## 2020-02-04 DIAGNOSIS — Z13.220 SCREENING FOR HYPERLIPIDEMIA: ICD-10-CM

## 2020-02-04 DIAGNOSIS — G82.20 PARAPLEGIA (HCC): ICD-10-CM

## 2020-02-04 DIAGNOSIS — E55.9 VITAMIN D DEFICIENCY: ICD-10-CM

## 2020-02-04 LAB
25(OH)D3 SERPL-MCNC: 21 NG/ML (ref 30–100)
ALBUMIN SERPL-MCNC: 4.1 G/DL (ref 3.5–5.2)
ALBUMIN/GLOB SERPL: 1.6 G/DL
ALP SERPL-CCNC: 51 U/L (ref 39–117)
ALT SERPL W P-5'-P-CCNC: 7 U/L (ref 1–41)
ANION GAP SERPL CALCULATED.3IONS-SCNC: 15.1 MMOL/L (ref 5–15)
AST SERPL-CCNC: 19 U/L (ref 1–40)
BASOPHILS # BLD AUTO: 0.07 10*3/MM3 (ref 0–0.2)
BASOPHILS NFR BLD AUTO: 1.3 % (ref 0–1.5)
BILIRUB SERPL-MCNC: 0.9 MG/DL (ref 0.2–1.2)
BUN BLD-MCNC: 12 MG/DL (ref 8–23)
BUN/CREAT SERPL: 35.3 (ref 7–25)
CALCIUM SPEC-SCNC: 9.1 MG/DL (ref 8.6–10.5)
CHLORIDE SERPL-SCNC: 95 MMOL/L (ref 98–107)
CO2 SERPL-SCNC: 21.9 MMOL/L (ref 22–29)
CREAT BLD-MCNC: 0.34 MG/DL (ref 0.76–1.27)
CRP SERPL-MCNC: 0.45 MG/DL (ref 0–0.5)
DEPRECATED RDW RBC AUTO: 42.5 FL (ref 37–54)
EOSINOPHIL # BLD AUTO: 0.2 10*3/MM3 (ref 0–0.4)
EOSINOPHIL NFR BLD AUTO: 3.8 % (ref 0.3–6.2)
ERYTHROCYTE [DISTWIDTH] IN BLOOD BY AUTOMATED COUNT: 12.3 % (ref 12.3–15.4)
GFR SERPL CREATININE-BSD FRML MDRD: >150 ML/MIN/1.73
GLOBULIN UR ELPH-MCNC: 2.5 GM/DL
GLUCOSE BLD-MCNC: 72 MG/DL (ref 65–99)
HCT VFR BLD AUTO: 36.2 % (ref 37.5–51)
HGB BLD-MCNC: 13 G/DL (ref 13–17.7)
IMM GRANULOCYTES # BLD AUTO: 0.01 10*3/MM3 (ref 0–0.05)
IMM GRANULOCYTES NFR BLD AUTO: 0.2 % (ref 0–0.5)
LYMPHOCYTES # BLD AUTO: 1.33 10*3/MM3 (ref 0.7–3.1)
LYMPHOCYTES NFR BLD AUTO: 25.4 % (ref 19.6–45.3)
MCH RBC QN AUTO: 33.9 PG (ref 26.6–33)
MCHC RBC AUTO-ENTMCNC: 35.9 G/DL (ref 31.5–35.7)
MCV RBC AUTO: 94.3 FL (ref 79–97)
MONOCYTES # BLD AUTO: 0.53 10*3/MM3 (ref 0.1–0.9)
MONOCYTES NFR BLD AUTO: 10.1 % (ref 5–12)
NEUTROPHILS # BLD AUTO: 3.09 10*3/MM3 (ref 1.7–7)
NEUTROPHILS NFR BLD AUTO: 59.2 % (ref 42.7–76)
NRBC BLD AUTO-RTO: 0 /100 WBC (ref 0–0.2)
PLATELET # BLD AUTO: 187 10*3/MM3 (ref 140–450)
PMV BLD AUTO: 10.4 FL (ref 6–12)
POTASSIUM BLD-SCNC: 4 MMOL/L (ref 3.5–5.2)
PROT SERPL-MCNC: 6.6 G/DL (ref 6–8.5)
RBC # BLD AUTO: 3.84 10*6/MM3 (ref 4.14–5.8)
SODIUM BLD-SCNC: 132 MMOL/L (ref 136–145)
WBC NRBC COR # BLD: 5.23 10*3/MM3 (ref 3.4–10.8)

## 2020-02-04 PROCEDURE — 85025 COMPLETE CBC W/AUTO DIFF WBC: CPT | Performed by: FAMILY MEDICINE

## 2020-02-04 PROCEDURE — 86140 C-REACTIVE PROTEIN: CPT | Performed by: FAMILY MEDICINE

## 2020-02-04 PROCEDURE — 82306 VITAMIN D 25 HYDROXY: CPT | Performed by: FAMILY MEDICINE

## 2020-02-04 PROCEDURE — 80053 COMPREHEN METABOLIC PANEL: CPT | Performed by: FAMILY MEDICINE

## 2020-02-04 PROCEDURE — G0009 ADMIN PNEUMOCOCCAL VACCINE: HCPCS | Performed by: FAMILY MEDICINE

## 2020-02-04 PROCEDURE — 99396 PREV VISIT EST AGE 40-64: CPT | Performed by: FAMILY MEDICINE

## 2020-02-04 PROCEDURE — 90670 PCV13 VACCINE IM: CPT | Performed by: FAMILY MEDICINE

## 2020-02-04 RX ORDER — TRAZODONE HYDROCHLORIDE 50 MG/1
50 TABLET ORAL NIGHTLY
Qty: 30 TABLET | Refills: 2 | Status: SHIPPED | OUTPATIENT
Start: 2020-02-04 | End: 2020-12-11

## 2020-02-04 RX ORDER — CUSHION
1 EACH MISCELLANEOUS CONTINUOUS
Qty: 1 EACH | Refills: 1 | Status: SHIPPED | OUTPATIENT
Start: 2020-02-04

## 2020-02-04 NOTE — PATIENT INSTRUCTIONS
1.  Continue medications and supplements - as listed.    2.  Continue well-balanced diet - low in calories and low in added sugar.  -Plant-based diets have the best evidence for decreasing inflammation and chronic pain, as well as reducing the risk of heart disease and dementia.    3.  Maintain a routine exercise program - 30 minutes at least 3 days every week.  This is important even if you are active at your job.    4.  A letter will be sent with your test results or they will be made available via [facility].  If you have not heard about your results within 10 days for chronic and routine labs, or 48 hours for acute labs, please call the office.    5.  If you would like to have your labs completed prior to your next visit to be discussed at your appointment, please call the office 1 to 2 weeks before your scheduled visit to request that lab orders be placed.
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
## 2020-02-20 ENCOUNTER — LAB (OUTPATIENT)
Dept: LAB | Facility: HOSPITAL | Age: 60
End: 2020-02-20

## 2020-02-20 DIAGNOSIS — Z00.00 PREVENTATIVE HEALTH CARE: ICD-10-CM

## 2020-02-20 LAB
ALBUMIN UR-MCNC: <1.2 MG/DL
BACTERIA UR QL AUTO: NORMAL /HPF
BILIRUB UR QL STRIP: NEGATIVE
CLARITY UR: CLEAR
COLOR UR: YELLOW
CREAT UR-MCNC: 8.5 MG/DL
GLUCOSE UR STRIP-MCNC: NEGATIVE MG/DL
HGB UR QL STRIP.AUTO: NEGATIVE
HYALINE CASTS UR QL AUTO: NORMAL /LPF
KETONES UR QL STRIP: NEGATIVE
LEUKOCYTE ESTERASE UR QL STRIP.AUTO: ABNORMAL
MICROALBUMIN/CREAT UR: NORMAL MG/G{CREAT}
NITRITE UR QL STRIP: NEGATIVE
PH UR STRIP.AUTO: 7 [PH] (ref 5–8)
PROT UR QL STRIP: NEGATIVE
RBC # UR: NORMAL /HPF
REF LAB TEST METHOD: NORMAL
SP GR UR STRIP: 1.01 (ref 1–1.03)
SQUAMOUS #/AREA URNS HPF: NORMAL /HPF
UROBILINOGEN UR QL STRIP: ABNORMAL
WBC UR QL AUTO: NORMAL /HPF

## 2020-02-20 PROCEDURE — 82043 UR ALBUMIN QUANTITATIVE: CPT

## 2020-02-20 PROCEDURE — 82570 ASSAY OF URINE CREATININE: CPT

## 2020-02-20 PROCEDURE — 81001 URINALYSIS AUTO W/SCOPE: CPT

## 2020-03-11 ENCOUNTER — TELEPHONE (OUTPATIENT)
Dept: FAMILY MEDICINE CLINIC | Facility: CLINIC | Age: 60
End: 2020-03-11

## 2020-03-11 ENCOUNTER — LAB (OUTPATIENT)
Dept: LAB | Facility: HOSPITAL | Age: 60
End: 2020-03-11

## 2020-03-11 DIAGNOSIS — L89.150 PRESSURE INJURY OF SACRAL REGION, UNSTAGEABLE (HCC): ICD-10-CM

## 2020-03-11 DIAGNOSIS — G82.20 PARAPLEGIA (HCC): Primary | ICD-10-CM

## 2020-03-11 DIAGNOSIS — G82.20 PARAPLEGIA (HCC): ICD-10-CM

## 2020-03-11 PROCEDURE — 87147 CULTURE TYPE IMMUNOLOGIC: CPT

## 2020-03-11 PROCEDURE — 87070 CULTURE OTHR SPECIMN AEROBIC: CPT

## 2020-03-11 PROCEDURE — 87186 SC STD MICRODIL/AGAR DIL: CPT

## 2020-03-11 PROCEDURE — 87205 SMEAR GRAM STAIN: CPT

## 2020-03-11 NOTE — TELEPHONE ENCOUNTER
Pt needs an order for a wound culture placed asap. He completed the lab and they have 40 minutes until the culture is .  Spoke with Dr. Garcia he said he will place order. ZACHARY

## 2020-03-12 ENCOUNTER — TELEPHONE (OUTPATIENT)
Dept: FAMILY MEDICINE CLINIC | Facility: CLINIC | Age: 60
End: 2020-03-12

## 2020-03-12 ENCOUNTER — LAB (OUTPATIENT)
Dept: LAB | Facility: HOSPITAL | Age: 60
End: 2020-03-12

## 2020-03-12 DIAGNOSIS — L02.31 CELLULITIS AND ABSCESS OF BUTTOCK: Primary | ICD-10-CM

## 2020-03-12 DIAGNOSIS — L03.317 CELLULITIS AND ABSCESS OF BUTTOCK: Primary | ICD-10-CM

## 2020-03-12 DIAGNOSIS — L02.31 CELLULITIS AND ABSCESS OF BUTTOCK: ICD-10-CM

## 2020-03-12 DIAGNOSIS — L03.317 CELLULITIS AND ABSCESS OF BUTTOCK: ICD-10-CM

## 2020-03-12 LAB
ALBUMIN SERPL-MCNC: 3.8 G/DL (ref 3.5–5.2)
ALBUMIN/GLOB SERPL: 1.4 G/DL
ALP SERPL-CCNC: 50 U/L (ref 39–117)
ALT SERPL W P-5'-P-CCNC: 8 U/L (ref 1–41)
ANION GAP SERPL CALCULATED.3IONS-SCNC: 13 MMOL/L (ref 5–15)
AST SERPL-CCNC: 13 U/L (ref 1–40)
BASOPHILS # BLD AUTO: 0.06 10*3/MM3 (ref 0–0.2)
BASOPHILS NFR BLD AUTO: 0.8 % (ref 0–1.5)
BILIRUB SERPL-MCNC: 0.6 MG/DL (ref 0.2–1.2)
BUN BLD-MCNC: 13 MG/DL (ref 8–23)
BUN/CREAT SERPL: 31 (ref 7–25)
CALCIUM SPEC-SCNC: 8.6 MG/DL (ref 8.6–10.5)
CHLORIDE SERPL-SCNC: 97 MMOL/L (ref 98–107)
CO2 SERPL-SCNC: 23 MMOL/L (ref 22–29)
CREAT BLD-MCNC: 0.42 MG/DL (ref 0.76–1.27)
CRP SERPL-MCNC: 4.72 MG/DL (ref 0–0.5)
DEPRECATED RDW RBC AUTO: 40.2 FL (ref 37–54)
EOSINOPHIL # BLD AUTO: 0.1 10*3/MM3 (ref 0–0.4)
EOSINOPHIL NFR BLD AUTO: 1.3 % (ref 0.3–6.2)
ERYTHROCYTE [DISTWIDTH] IN BLOOD BY AUTOMATED COUNT: 11.7 % (ref 12.3–15.4)
ERYTHROCYTE [SEDIMENTATION RATE] IN BLOOD: 32 MM/HR (ref 0–20)
GFR SERPL CREATININE-BSD FRML MDRD: >150 ML/MIN/1.73
GLOBULIN UR ELPH-MCNC: 2.7 GM/DL
GLUCOSE BLD-MCNC: 111 MG/DL (ref 65–99)
HCT VFR BLD AUTO: 34.6 % (ref 37.5–51)
HGB BLD-MCNC: 11.9 G/DL (ref 13–17.7)
IMM GRANULOCYTES # BLD AUTO: 0.03 10*3/MM3 (ref 0–0.05)
IMM GRANULOCYTES NFR BLD AUTO: 0.4 % (ref 0–0.5)
LYMPHOCYTES # BLD AUTO: 1.31 10*3/MM3 (ref 0.7–3.1)
LYMPHOCYTES NFR BLD AUTO: 17.4 % (ref 19.6–45.3)
MCH RBC QN AUTO: 32.6 PG (ref 26.6–33)
MCHC RBC AUTO-ENTMCNC: 34.4 G/DL (ref 31.5–35.7)
MCV RBC AUTO: 94.8 FL (ref 79–97)
MONOCYTES # BLD AUTO: 1.01 10*3/MM3 (ref 0.1–0.9)
MONOCYTES NFR BLD AUTO: 13.4 % (ref 5–12)
NEUTROPHILS # BLD AUTO: 5.01 10*3/MM3 (ref 1.7–7)
NEUTROPHILS NFR BLD AUTO: 66.7 % (ref 42.7–76)
NRBC BLD AUTO-RTO: 0 /100 WBC (ref 0–0.2)
PLATELET # BLD AUTO: 247 10*3/MM3 (ref 140–450)
PMV BLD AUTO: 10.6 FL (ref 6–12)
POTASSIUM BLD-SCNC: 4.5 MMOL/L (ref 3.5–5.2)
PROT SERPL-MCNC: 6.5 G/DL (ref 6–8.5)
RBC # BLD AUTO: 3.65 10*6/MM3 (ref 4.14–5.8)
SODIUM BLD-SCNC: 133 MMOL/L (ref 136–145)
WBC NRBC COR # BLD: 7.52 10*3/MM3 (ref 3.4–10.8)

## 2020-03-12 PROCEDURE — 87186 SC STD MICRODIL/AGAR DIL: CPT

## 2020-03-12 PROCEDURE — 85652 RBC SED RATE AUTOMATED: CPT

## 2020-03-12 PROCEDURE — 86140 C-REACTIVE PROTEIN: CPT

## 2020-03-12 PROCEDURE — 85025 COMPLETE CBC W/AUTO DIFF WBC: CPT

## 2020-03-12 PROCEDURE — 87147 CULTURE TYPE IMMUNOLOGIC: CPT

## 2020-03-12 PROCEDURE — 87040 BLOOD CULTURE FOR BACTERIA: CPT

## 2020-03-12 PROCEDURE — 87150 DNA/RNA AMPLIFIED PROBE: CPT

## 2020-03-12 PROCEDURE — 80053 COMPREHEN METABOLIC PANEL: CPT

## 2020-03-12 NOTE — TELEPHONE ENCOUNTER
Called and spoke with pt, he stated he is needing to know if he has a blood infection. Pt stated he was on IV abx for about 12 weeks last year so he is just needing to make sure this is not happening again. Pt stated the orders should be in the computer from when this happened last year. Dr. Andry Szymanski with ID is who ordered this before. Please advise.

## 2020-03-12 NOTE — TELEPHONE ENCOUNTER
PT CALLED TO REQUEST AN ORDER FOR LABS TO BE TAKEN THIS AFTERNOON.    PT CONTACT [phone]     PLEASE ADVISE

## 2020-03-13 ENCOUNTER — TELEPHONE (OUTPATIENT)
Dept: FAMILY MEDICINE CLINIC | Facility: CLINIC | Age: 60
End: 2020-03-13

## 2020-03-13 LAB
BACTERIA BLD CULT: ABNORMAL
BACTERIA SPEC AEROBE CULT: ABNORMAL
BACTERIA SPEC AEROBE CULT: ABNORMAL
BOTTLE TYPE: ABNORMAL
GRAM STN SPEC: ABNORMAL
GRAM STN SPEC: ABNORMAL
STREP GROUPING: ABNORMAL

## 2020-03-13 NOTE — TELEPHONE ENCOUNTER
Thank you. Called and discussed with patient's ID physician Dr. Szymanski who will have his office arrange further steps with the patient.

## 2020-03-15 LAB
BACTERIA SPEC AEROBE CULT: ABNORMAL
GRAM STN SPEC: ABNORMAL
ISOLATED FROM: ABNORMAL

## 2020-03-16 ENCOUNTER — HOSPITAL ENCOUNTER (OUTPATIENT)
Dept: INFUSION THERAPY | Facility: HOSPITAL | Age: 60
Discharge: HOME | End: 2020-03-16
Admitting: INTERNAL MEDICINE

## 2020-03-16 ENCOUNTER — LAB (OUTPATIENT)
Dept: LAB | Facility: HOSPITAL | Age: 60
End: 2020-03-16

## 2020-03-16 ENCOUNTER — TRANSCRIBE ORDERS (OUTPATIENT)
Dept: ADMINISTRATIVE | Facility: HOSPITAL | Age: 60
End: 2020-03-16

## 2020-03-16 VITALS
OXYGEN SATURATION: 98 % | HEART RATE: 59 BPM | RESPIRATION RATE: 16 BRPM | SYSTOLIC BLOOD PRESSURE: 91 MMHG | TEMPERATURE: 98.1 F | DIASTOLIC BLOOD PRESSURE: 68 MMHG

## 2020-03-16 DIAGNOSIS — L89.314 STAGE IV PRESSURE ULCER OF RIGHT BUTTOCK (HCC): ICD-10-CM

## 2020-03-16 DIAGNOSIS — B95.61 STAPHYLOCOCCUS AUREUS SUPERFICIAL FOLLICULITIS: ICD-10-CM

## 2020-03-16 DIAGNOSIS — B95.1 GBBS (GROUP B BETA HEMOLYTIC STREPTOCOCCUS) PHARYNGITIS: ICD-10-CM

## 2020-03-16 DIAGNOSIS — L73.9 STAPHYLOCOCCUS AUREUS SUPERFICIAL FOLLICULITIS: ICD-10-CM

## 2020-03-16 DIAGNOSIS — L02.31 CELLULITIS AND ABSCESS OF BUTTOCK: ICD-10-CM

## 2020-03-16 DIAGNOSIS — B95.4 BACTERIAL INFECTION DUE TO STREPTOCOCCUS, GROUP G: ICD-10-CM

## 2020-03-16 DIAGNOSIS — L89.314 PRESSURE ULCER OF RIGHT ISCHIUM, STAGE IV (HCC): ICD-10-CM

## 2020-03-16 DIAGNOSIS — L03.317 CELLULITIS AND ABSCESS OF BUTTOCK: ICD-10-CM

## 2020-03-16 DIAGNOSIS — G82.50 QUADRIPLEGIA, UNSPECIFIED (HCC): ICD-10-CM

## 2020-03-16 DIAGNOSIS — J02.0 GBBS (GROUP B BETA HEMOLYTIC STREPTOCOCCUS) PHARYNGITIS: ICD-10-CM

## 2020-03-16 DIAGNOSIS — L89.314 PRESSURE ULCER OF RIGHT ISCHIUM, STAGE IV (HCC): Primary | ICD-10-CM

## 2020-03-16 LAB
ALBUMIN SERPL-MCNC: 3.5 G/DL (ref 3.5–5.2)
ALBUMIN/GLOB SERPL: 1.1 G/DL
ALP SERPL-CCNC: 45 U/L (ref 39–117)
ALT SERPL W P-5'-P-CCNC: 8 U/L (ref 1–41)
ANION GAP SERPL CALCULATED.3IONS-SCNC: 12 MMOL/L (ref 5–15)
AST SERPL-CCNC: 12 U/L (ref 1–40)
BASOPHILS # BLD AUTO: 0.07 10*3/MM3 (ref 0–0.2)
BASOPHILS NFR BLD AUTO: 0.9 % (ref 0–1.5)
BILIRUB SERPL-MCNC: 0.2 MG/DL (ref 0.2–1.2)
BUN BLD-MCNC: 9 MG/DL (ref 8–23)
BUN/CREAT SERPL: 27.3 (ref 7–25)
CALCIUM SPEC-SCNC: 9.1 MG/DL (ref 8.6–10.5)
CHLORIDE SERPL-SCNC: 97 MMOL/L (ref 98–107)
CO2 SERPL-SCNC: 24 MMOL/L (ref 22–29)
CREAT BLD-MCNC: 0.33 MG/DL (ref 0.76–1.27)
CRP SERPL-MCNC: 2.88 MG/DL (ref 0–0.5)
DEPRECATED RDW RBC AUTO: 48.1 FL (ref 37–54)
EOSINOPHIL # BLD AUTO: 0.23 10*3/MM3 (ref 0–0.4)
EOSINOPHIL NFR BLD AUTO: 2.8 % (ref 0.3–6.2)
ERYTHROCYTE [DISTWIDTH] IN BLOOD BY AUTOMATED COUNT: 12.8 % (ref 12.3–15.4)
ERYTHROCYTE [SEDIMENTATION RATE] IN BLOOD: 33 MM/HR (ref 0–20)
GFR SERPL CREATININE-BSD FRML MDRD: >150 ML/MIN/1.73
GLOBULIN UR ELPH-MCNC: 3.1 GM/DL
GLUCOSE BLD-MCNC: 89 MG/DL (ref 65–99)
HCT VFR BLD AUTO: 35 % (ref 37.5–51)
HGB BLD-MCNC: 11.8 G/DL (ref 13–17.7)
IMM GRANULOCYTES # BLD AUTO: 0.02 10*3/MM3 (ref 0–0.05)
IMM GRANULOCYTES NFR BLD AUTO: 0.2 % (ref 0–0.5)
LYMPHOCYTES # BLD AUTO: 1.7 10*3/MM3 (ref 0.7–3.1)
LYMPHOCYTES NFR BLD AUTO: 21 % (ref 19.6–45.3)
MCH RBC QN AUTO: 34.4 PG (ref 26.6–33)
MCHC RBC AUTO-ENTMCNC: 33.7 G/DL (ref 31.5–35.7)
MCV RBC AUTO: 102 FL (ref 79–97)
MONOCYTES # BLD AUTO: 0.83 10*3/MM3 (ref 0.1–0.9)
MONOCYTES NFR BLD AUTO: 10.3 % (ref 5–12)
NEUTROPHILS # BLD AUTO: 5.23 10*3/MM3 (ref 1.7–7)
NEUTROPHILS NFR BLD AUTO: 64.8 % (ref 42.7–76)
NRBC BLD AUTO-RTO: 0 /100 WBC (ref 0–0.2)
PLATELET # BLD AUTO: 308 10*3/MM3 (ref 140–450)
PMV BLD AUTO: 9.3 FL (ref 6–12)
POTASSIUM BLD-SCNC: 4.4 MMOL/L (ref 3.5–5.2)
PROT SERPL-MCNC: 6.6 G/DL (ref 6–8.5)
RBC # BLD AUTO: 3.43 10*6/MM3 (ref 4.14–5.8)
SODIUM BLD-SCNC: 133 MMOL/L (ref 136–145)
WBC NRBC COR # BLD: 8.08 10*3/MM3 (ref 3.4–10.8)

## 2020-03-16 PROCEDURE — 85652 RBC SED RATE AUTOMATED: CPT

## 2020-03-16 PROCEDURE — 86140 C-REACTIVE PROTEIN: CPT

## 2020-03-16 PROCEDURE — 36415 COLL VENOUS BLD VENIPUNCTURE: CPT

## 2020-03-16 PROCEDURE — 85025 COMPLETE CBC W/AUTO DIFF WBC: CPT

## 2020-03-16 PROCEDURE — C1751 CATH, INF, PER/CENT/MIDLINE: HCPCS

## 2020-03-16 PROCEDURE — C1894 INTRO/SHEATH, NON-LASER: HCPCS

## 2020-03-16 PROCEDURE — 80053 COMPREHEN METABOLIC PANEL: CPT

## 2020-03-16 RX ORDER — SODIUM CHLORIDE 0.9 % (FLUSH) 0.9 %
10 SYRINGE (ML) INJECTION AS NEEDED
Status: DISCONTINUED | OUTPATIENT
Start: 2020-03-16 | End: 2020-03-18 | Stop reason: HOSPADM

## 2020-03-16 RX ORDER — SODIUM CHLORIDE 0.9 % (FLUSH) 0.9 %
10 SYRINGE (ML) INJECTION EVERY 12 HOURS SCHEDULED
Status: DISCONTINUED | OUTPATIENT
Start: 2020-03-16 | End: 2020-03-18 | Stop reason: HOSPADM

## 2020-03-16 NOTE — NURSING NOTE
Pt came for PICC line placement today. Pt is receiving antibiotics for pressure ulcer. Pt tolerated well, and discharged with his wife to infectious disease to receive infusion. Pt has no questions at this time.

## 2020-03-16 NOTE — ADDENDUM NOTE
Encounter addended by: Vikki Dominguez CNA on: 3/16/2020 2:30 PM   Actions taken: Flowsheet accepted

## 2020-03-17 LAB — BACTERIA SPEC AEROBE CULT: NORMAL

## 2020-03-26 ENCOUNTER — TRANSCRIBE ORDERS (OUTPATIENT)
Dept: LAB | Facility: HOSPITAL | Age: 60
End: 2020-03-26

## 2020-03-26 ENCOUNTER — APPOINTMENT (OUTPATIENT)
Dept: LAB | Facility: HOSPITAL | Age: 60
End: 2020-03-26

## 2020-03-26 DIAGNOSIS — L03.317 CELLULITIS AND ABSCESS OF BUTTOCK: ICD-10-CM

## 2020-03-26 DIAGNOSIS — B95.61 STAPHYLOCOCCUS AUREUS SUPERFICIAL FOLLICULITIS: ICD-10-CM

## 2020-03-26 DIAGNOSIS — L02.31 CELLULITIS AND ABSCESS OF BUTTOCK: ICD-10-CM

## 2020-03-26 DIAGNOSIS — G82.50 QUADRIPLEGIA, UNSPECIFIED (HCC): ICD-10-CM

## 2020-03-26 DIAGNOSIS — L73.9 STAPHYLOCOCCUS AUREUS SUPERFICIAL FOLLICULITIS: ICD-10-CM

## 2020-03-26 DIAGNOSIS — B95.1 GBBS (GROUP B BETA HEMOLYTIC STREPTOCOCCUS) PHARYNGITIS: ICD-10-CM

## 2020-03-26 DIAGNOSIS — J02.0 GBBS (GROUP B BETA HEMOLYTIC STREPTOCOCCUS) PHARYNGITIS: ICD-10-CM

## 2020-03-26 DIAGNOSIS — R50.9 HYPERTHERMIA-INDUCED DEFECT: Primary | ICD-10-CM

## 2020-03-26 LAB
ALBUMIN SERPL-MCNC: 4 G/DL (ref 3.5–5.2)
ALBUMIN/GLOB SERPL: 1.1 G/DL
ALP SERPL-CCNC: 51 U/L (ref 39–117)
ALT SERPL W P-5'-P-CCNC: 9 U/L (ref 1–41)
ANION GAP SERPL CALCULATED.3IONS-SCNC: 13 MMOL/L (ref 5–15)
AST SERPL-CCNC: 15 U/L (ref 1–40)
BASOPHILS # BLD AUTO: 0.07 10*3/MM3 (ref 0–0.2)
BASOPHILS NFR BLD AUTO: 0.9 % (ref 0–1.5)
BILIRUB SERPL-MCNC: <0.2 MG/DL (ref 0.2–1.2)
BUN BLD-MCNC: 10 MG/DL (ref 8–23)
BUN/CREAT SERPL: 27 (ref 7–25)
CALCIUM SPEC-SCNC: 9.7 MG/DL (ref 8.6–10.5)
CHLORIDE SERPL-SCNC: 97 MMOL/L (ref 98–107)
CO2 SERPL-SCNC: 25 MMOL/L (ref 22–29)
CREAT BLD-MCNC: 0.37 MG/DL (ref 0.76–1.27)
CRP SERPL-MCNC: 5.31 MG/DL (ref 0–0.5)
DEPRECATED RDW RBC AUTO: 45.9 FL (ref 37–54)
EOSINOPHIL # BLD AUTO: 0.21 10*3/MM3 (ref 0–0.4)
EOSINOPHIL NFR BLD AUTO: 2.6 % (ref 0.3–6.2)
ERYTHROCYTE [DISTWIDTH] IN BLOOD BY AUTOMATED COUNT: 12.4 % (ref 12.3–15.4)
ERYTHROCYTE [SEDIMENTATION RATE] IN BLOOD: 84 MM/HR (ref 0–20)
GFR SERPL CREATININE-BSD FRML MDRD: >150 ML/MIN/1.73
GLOBULIN UR ELPH-MCNC: 3.7 GM/DL
GLUCOSE BLD-MCNC: 96 MG/DL (ref 65–99)
HCT VFR BLD AUTO: 39.3 % (ref 37.5–51)
HGB BLD-MCNC: 12.9 G/DL (ref 13–17.7)
IMM GRANULOCYTES # BLD AUTO: 0.03 10*3/MM3 (ref 0–0.05)
IMM GRANULOCYTES NFR BLD AUTO: 0.4 % (ref 0–0.5)
LYMPHOCYTES # BLD AUTO: 2.08 10*3/MM3 (ref 0.7–3.1)
LYMPHOCYTES NFR BLD AUTO: 25.9 % (ref 19.6–45.3)
MCH RBC QN AUTO: 32.9 PG (ref 26.6–33)
MCHC RBC AUTO-ENTMCNC: 32.8 G/DL (ref 31.5–35.7)
MCV RBC AUTO: 100.3 FL (ref 79–97)
MONOCYTES # BLD AUTO: 0.86 10*3/MM3 (ref 0.1–0.9)
MONOCYTES NFR BLD AUTO: 10.7 % (ref 5–12)
NEUTROPHILS # BLD AUTO: 4.79 10*3/MM3 (ref 1.7–7)
NEUTROPHILS NFR BLD AUTO: 59.5 % (ref 42.7–76)
NRBC BLD AUTO-RTO: 0 /100 WBC (ref 0–0.2)
PLATELET # BLD AUTO: 394 10*3/MM3 (ref 140–450)
PMV BLD AUTO: 9.4 FL (ref 6–12)
POTASSIUM BLD-SCNC: 4.5 MMOL/L (ref 3.5–5.2)
PROT SERPL-MCNC: 7.7 G/DL (ref 6–8.5)
RBC # BLD AUTO: 3.92 10*6/MM3 (ref 4.14–5.8)
SODIUM BLD-SCNC: 135 MMOL/L (ref 136–145)
WBC NRBC COR # BLD: 8.04 10*3/MM3 (ref 3.4–10.8)

## 2020-03-26 PROCEDURE — 85652 RBC SED RATE AUTOMATED: CPT | Performed by: INTERNAL MEDICINE

## 2020-03-26 PROCEDURE — 86140 C-REACTIVE PROTEIN: CPT | Performed by: INTERNAL MEDICINE

## 2020-03-26 PROCEDURE — 80053 COMPREHEN METABOLIC PANEL: CPT | Performed by: INTERNAL MEDICINE

## 2020-03-26 PROCEDURE — 85025 COMPLETE CBC W/AUTO DIFF WBC: CPT | Performed by: INTERNAL MEDICINE

## 2020-03-30 ENCOUNTER — TRANSCRIBE ORDERS (OUTPATIENT)
Dept: LAB | Facility: HOSPITAL | Age: 60
End: 2020-03-30

## 2020-03-30 ENCOUNTER — LAB (OUTPATIENT)
Dept: LAB | Facility: HOSPITAL | Age: 60
End: 2020-03-30

## 2020-03-30 DIAGNOSIS — R50.9 HYPERTHERMIA-INDUCED DEFECT: Primary | ICD-10-CM

## 2020-03-30 DIAGNOSIS — B95.1 GBBS (GROUP B BETA HEMOLYTIC STREPTOCOCCUS) PHARYNGITIS: ICD-10-CM

## 2020-03-30 DIAGNOSIS — L73.9 STAPHYLOCOCCUS AUREUS SUPERFICIAL FOLLICULITIS: ICD-10-CM

## 2020-03-30 DIAGNOSIS — R19.7 DIARRHEA, UNSPECIFIED TYPE: ICD-10-CM

## 2020-03-30 DIAGNOSIS — R19.7 DIARRHEA OF PRESUMED INFECTIOUS ORIGIN: ICD-10-CM

## 2020-03-30 DIAGNOSIS — N39.0 URINARY TRACT INFECTION WITHOUT HEMATURIA, SITE UNSPECIFIED: ICD-10-CM

## 2020-03-30 DIAGNOSIS — B95.61 STAPHYLOCOCCUS AUREUS SUPERFICIAL FOLLICULITIS: ICD-10-CM

## 2020-03-30 DIAGNOSIS — J02.0 GBBS (GROUP B BETA HEMOLYTIC STREPTOCOCCUS) PHARYNGITIS: ICD-10-CM

## 2020-03-30 LAB
ALBUMIN SERPL-MCNC: 3.7 G/DL (ref 3.5–5.2)
ALBUMIN/GLOB SERPL: 1.1 G/DL
ALP SERPL-CCNC: 53 U/L (ref 39–117)
ALT SERPL W P-5'-P-CCNC: 7 U/L (ref 1–41)
ANION GAP SERPL CALCULATED.3IONS-SCNC: 15 MMOL/L (ref 5–15)
AST SERPL-CCNC: 12 U/L (ref 1–40)
BASOPHILS # BLD AUTO: 0.05 10*3/MM3 (ref 0–0.2)
BASOPHILS NFR BLD AUTO: 0.4 % (ref 0–1.5)
BILIRUB SERPL-MCNC: 0.3 MG/DL (ref 0.2–1.2)
BUN BLD-MCNC: 10 MG/DL (ref 8–23)
BUN/CREAT SERPL: 26.3 (ref 7–25)
CALCIUM SPEC-SCNC: 9.5 MG/DL (ref 8.6–10.5)
CHLORIDE SERPL-SCNC: 95 MMOL/L (ref 98–107)
CO2 SERPL-SCNC: 22 MMOL/L (ref 22–29)
CREAT BLD-MCNC: 0.38 MG/DL (ref 0.76–1.27)
CRP SERPL-MCNC: 5.4 MG/DL (ref 0–0.5)
DEPRECATED RDW RBC AUTO: 46.1 FL (ref 37–54)
EOSINOPHIL # BLD AUTO: 0.24 10*3/MM3 (ref 0–0.4)
EOSINOPHIL NFR BLD AUTO: 1.7 % (ref 0.3–6.2)
ERYTHROCYTE [DISTWIDTH] IN BLOOD BY AUTOMATED COUNT: 12.5 % (ref 12.3–15.4)
ERYTHROCYTE [SEDIMENTATION RATE] IN BLOOD: 60 MM/HR (ref 0–20)
GFR SERPL CREATININE-BSD FRML MDRD: >150 ML/MIN/1.73
GLOBULIN UR ELPH-MCNC: 3.5 GM/DL
GLUCOSE BLD-MCNC: 106 MG/DL (ref 65–99)
HCT VFR BLD AUTO: 37.1 % (ref 37.5–51)
HGB BLD-MCNC: 12.2 G/DL (ref 13–17.7)
IMM GRANULOCYTES # BLD AUTO: 0.05 10*3/MM3 (ref 0–0.05)
IMM GRANULOCYTES NFR BLD AUTO: 0.4 % (ref 0–0.5)
LYMPHOCYTES # BLD AUTO: 1.15 10*3/MM3 (ref 0.7–3.1)
LYMPHOCYTES NFR BLD AUTO: 8.2 % (ref 19.6–45.3)
MCH RBC QN AUTO: 33 PG (ref 26.6–33)
MCHC RBC AUTO-ENTMCNC: 32.9 G/DL (ref 31.5–35.7)
MCV RBC AUTO: 100.3 FL (ref 79–97)
MONOCYTES # BLD AUTO: 0.84 10*3/MM3 (ref 0.1–0.9)
MONOCYTES NFR BLD AUTO: 6 % (ref 5–12)
NEUTROPHILS # BLD AUTO: 11.73 10*3/MM3 (ref 1.7–7)
NEUTROPHILS NFR BLD AUTO: 83.3 % (ref 42.7–76)
NRBC BLD AUTO-RTO: 0 /100 WBC (ref 0–0.2)
PLATELET # BLD AUTO: 358 10*3/MM3 (ref 140–450)
PMV BLD AUTO: 8.6 FL (ref 6–12)
POTASSIUM BLD-SCNC: 3.8 MMOL/L (ref 3.5–5.2)
PROT SERPL-MCNC: 7.2 G/DL (ref 6–8.5)
RBC # BLD AUTO: 3.7 10*6/MM3 (ref 4.14–5.8)
SODIUM BLD-SCNC: 132 MMOL/L (ref 136–145)
WBC NRBC COR # BLD: 14.06 10*3/MM3 (ref 3.4–10.8)

## 2020-03-30 PROCEDURE — 85652 RBC SED RATE AUTOMATED: CPT

## 2020-03-30 PROCEDURE — 85025 COMPLETE CBC W/AUTO DIFF WBC: CPT

## 2020-03-30 PROCEDURE — 86140 C-REACTIVE PROTEIN: CPT

## 2020-03-30 PROCEDURE — 80053 COMPREHEN METABOLIC PANEL: CPT

## 2020-03-30 PROCEDURE — 36415 COLL VENOUS BLD VENIPUNCTURE: CPT

## 2020-03-31 ENCOUNTER — APPOINTMENT (OUTPATIENT)
Dept: LAB | Facility: HOSPITAL | Age: 60
End: 2020-03-31

## 2020-03-31 LAB — C DIFF TOX GENS STL QL NAA+PROBE: DETECTED

## 2020-03-31 PROCEDURE — 87493 C DIFF AMPLIFIED PROBE: CPT | Performed by: INTERNAL MEDICINE

## 2020-04-06 ENCOUNTER — TRANSCRIBE ORDERS (OUTPATIENT)
Dept: ADMINISTRATIVE | Facility: HOSPITAL | Age: 60
End: 2020-04-06

## 2020-04-06 ENCOUNTER — LAB (OUTPATIENT)
Dept: LAB | Facility: HOSPITAL | Age: 60
End: 2020-04-06

## 2020-04-06 DIAGNOSIS — R19.7 DIARRHEA OF PRESUMED INFECTIOUS ORIGIN: ICD-10-CM

## 2020-04-06 DIAGNOSIS — J02.0 GBBS (GROUP B BETA HEMOLYTIC STREPTOCOCCUS) PHARYNGITIS: ICD-10-CM

## 2020-04-06 DIAGNOSIS — R50.9 HYPERTHERMIA-INDUCED DEFECT: Primary | ICD-10-CM

## 2020-04-06 DIAGNOSIS — B95.1 GBBS (GROUP B BETA HEMOLYTIC STREPTOCOCCUS) PHARYNGITIS: ICD-10-CM

## 2020-04-06 DIAGNOSIS — N39.0 URINARY TRACT INFECTION WITHOUT HEMATURIA, SITE UNSPECIFIED: ICD-10-CM

## 2020-04-06 DIAGNOSIS — M46.28 OSTEOMYELITIS OF SACROILIAC REGION (HCC): Primary | ICD-10-CM

## 2020-04-06 LAB
ALBUMIN SERPL-MCNC: 3.9 G/DL (ref 3.5–5.2)
ALBUMIN/GLOB SERPL: 1.4 G/DL
ALP SERPL-CCNC: 42 U/L (ref 39–117)
ALT SERPL W P-5'-P-CCNC: 9 U/L (ref 1–41)
ANION GAP SERPL CALCULATED.3IONS-SCNC: 13.5 MMOL/L (ref 5–15)
AST SERPL-CCNC: 11 U/L (ref 1–40)
BASOPHILS # BLD AUTO: 0.1 10*3/MM3 (ref 0–0.2)
BASOPHILS NFR BLD AUTO: 1.3 % (ref 0–1.5)
BILIRUB SERPL-MCNC: 0.2 MG/DL (ref 0.2–1.2)
BUN BLD-MCNC: 8 MG/DL (ref 8–23)
BUN/CREAT SERPL: 27.6 (ref 7–25)
CALCIUM SPEC-SCNC: 9.3 MG/DL (ref 8.6–10.5)
CHLORIDE SERPL-SCNC: 96 MMOL/L (ref 98–107)
CO2 SERPL-SCNC: 22.5 MMOL/L (ref 22–29)
CREAT BLD-MCNC: 0.29 MG/DL (ref 0.76–1.27)
CRP SERPL-MCNC: 2.48 MG/DL (ref 0–0.5)
DEPRECATED RDW RBC AUTO: 45.7 FL (ref 37–54)
EOSINOPHIL # BLD AUTO: 0.23 10*3/MM3 (ref 0–0.4)
EOSINOPHIL NFR BLD AUTO: 3.1 % (ref 0.3–6.2)
ERYTHROCYTE [DISTWIDTH] IN BLOOD BY AUTOMATED COUNT: 12.6 % (ref 12.3–15.4)
ERYTHROCYTE [SEDIMENTATION RATE] IN BLOOD: 52 MM/HR (ref 0–20)
GFR SERPL CREATININE-BSD FRML MDRD: >150 ML/MIN/1.73
GLOBULIN UR ELPH-MCNC: 2.8 GM/DL
GLUCOSE BLD-MCNC: 100 MG/DL (ref 65–99)
HCT VFR BLD AUTO: 38.3 % (ref 37.5–51)
HGB BLD-MCNC: 12.5 G/DL (ref 13–17.7)
IMM GRANULOCYTES # BLD AUTO: 0.12 10*3/MM3 (ref 0–0.05)
IMM GRANULOCYTES NFR BLD AUTO: 1.6 % (ref 0–0.5)
LYMPHOCYTES # BLD AUTO: 1.57 10*3/MM3 (ref 0.7–3.1)
LYMPHOCYTES NFR BLD AUTO: 21 % (ref 19.6–45.3)
MCH RBC QN AUTO: 32.1 PG (ref 26.6–33)
MCHC RBC AUTO-ENTMCNC: 32.6 G/DL (ref 31.5–35.7)
MCV RBC AUTO: 98.2 FL (ref 79–97)
MONOCYTES # BLD AUTO: 0.86 10*3/MM3 (ref 0.1–0.9)
MONOCYTES NFR BLD AUTO: 11.5 % (ref 5–12)
NEUTROPHILS # BLD AUTO: 4.58 10*3/MM3 (ref 1.7–7)
NEUTROPHILS NFR BLD AUTO: 61.5 % (ref 42.7–76)
NRBC BLD AUTO-RTO: 0 /100 WBC (ref 0–0.2)
PLATELET # BLD AUTO: 423 10*3/MM3 (ref 140–450)
PMV BLD AUTO: 9.5 FL (ref 6–12)
POTASSIUM BLD-SCNC: 4.5 MMOL/L (ref 3.5–5.2)
PROT SERPL-MCNC: 6.7 G/DL (ref 6–8.5)
RBC # BLD AUTO: 3.9 10*6/MM3 (ref 4.14–5.8)
SODIUM BLD-SCNC: 132 MMOL/L (ref 136–145)
WBC NRBC COR # BLD: 7.46 10*3/MM3 (ref 3.4–10.8)

## 2020-04-06 PROCEDURE — 85652 RBC SED RATE AUTOMATED: CPT

## 2020-04-06 PROCEDURE — 80053 COMPREHEN METABOLIC PANEL: CPT

## 2020-04-06 PROCEDURE — 86140 C-REACTIVE PROTEIN: CPT

## 2020-04-06 PROCEDURE — 36415 COLL VENOUS BLD VENIPUNCTURE: CPT

## 2020-04-06 PROCEDURE — 85025 COMPLETE CBC W/AUTO DIFF WBC: CPT

## 2020-04-07 ENCOUNTER — HOSPITAL ENCOUNTER (OUTPATIENT)
Dept: CT IMAGING | Facility: HOSPITAL | Age: 60
Discharge: HOME | End: 2020-04-07
Admitting: INTERNAL MEDICINE

## 2020-04-07 DIAGNOSIS — M46.28 OSTEOMYELITIS OF SACROILIAC REGION (HCC): ICD-10-CM

## 2020-04-07 PROCEDURE — 72192 CT PELVIS W/O DYE: CPT

## 2020-04-13 ENCOUNTER — LAB (OUTPATIENT)
Dept: LAB | Facility: HOSPITAL | Age: 60
End: 2020-04-13

## 2020-04-13 ENCOUNTER — TRANSCRIBE ORDERS (OUTPATIENT)
Dept: LAB | Facility: HOSPITAL | Age: 60
End: 2020-04-13

## 2020-04-13 DIAGNOSIS — B95.61 STAPHYLOCOCCUS AUREUS SUPERFICIAL FOLLICULITIS: ICD-10-CM

## 2020-04-13 DIAGNOSIS — N39.0 URINARY TRACT INFECTION WITHOUT HEMATURIA, SITE UNSPECIFIED: ICD-10-CM

## 2020-04-13 DIAGNOSIS — L73.9 STAPHYLOCOCCUS AUREUS SUPERFICIAL FOLLICULITIS: ICD-10-CM

## 2020-04-13 DIAGNOSIS — B95.1 GBBS (GROUP B BETA HEMOLYTIC STREPTOCOCCUS) PHARYNGITIS: ICD-10-CM

## 2020-04-13 DIAGNOSIS — R50.9 HYPERTHERMIA-INDUCED DEFECT: Primary | ICD-10-CM

## 2020-04-13 DIAGNOSIS — J02.0 GBBS (GROUP B BETA HEMOLYTIC STREPTOCOCCUS) PHARYNGITIS: ICD-10-CM

## 2020-04-13 DIAGNOSIS — R19.7 DIARRHEA OF PRESUMED INFECTIOUS ORIGIN: ICD-10-CM

## 2020-04-13 DIAGNOSIS — R50.9 HYPERTHERMIA-INDUCED DEFECT: ICD-10-CM

## 2020-04-13 LAB
ALBUMIN SERPL-MCNC: 4.1 G/DL (ref 3.5–5.2)
ALBUMIN/GLOB SERPL: 1.1 G/DL
ALP SERPL-CCNC: 49 U/L (ref 39–117)
ALT SERPL W P-5'-P-CCNC: 10 U/L (ref 1–41)
ANION GAP SERPL CALCULATED.3IONS-SCNC: 15 MMOL/L (ref 5–15)
AST SERPL-CCNC: 16 U/L (ref 1–40)
BASOPHILS # BLD AUTO: 0.1 10*3/MM3 (ref 0–0.2)
BASOPHILS NFR BLD AUTO: 1.1 % (ref 0–1.5)
BILIRUB SERPL-MCNC: 0.2 MG/DL (ref 0.2–1.2)
BUN BLD-MCNC: 17 MG/DL (ref 8–23)
BUN/CREAT SERPL: 40.5 (ref 7–25)
CALCIUM SPEC-SCNC: 9.7 MG/DL (ref 8.6–10.5)
CHLORIDE SERPL-SCNC: 98 MMOL/L (ref 98–107)
CO2 SERPL-SCNC: 23 MMOL/L (ref 22–29)
CREAT BLD-MCNC: 0.42 MG/DL (ref 0.76–1.27)
CRP SERPL-MCNC: 1.72 MG/DL (ref 0–0.5)
DEPRECATED RDW RBC AUTO: 45.6 FL (ref 37–54)
EOSINOPHIL # BLD AUTO: 0.3 10*3/MM3 (ref 0–0.4)
EOSINOPHIL NFR BLD AUTO: 3.4 % (ref 0.3–6.2)
ERYTHROCYTE [DISTWIDTH] IN BLOOD BY AUTOMATED COUNT: 12.8 % (ref 12.3–15.4)
ERYTHROCYTE [SEDIMENTATION RATE] IN BLOOD: 61 MM/HR (ref 0–20)
GFR SERPL CREATININE-BSD FRML MDRD: >150 ML/MIN/1.73
GLOBULIN UR ELPH-MCNC: 3.6 GM/DL
GLUCOSE BLD-MCNC: 109 MG/DL (ref 65–99)
HCT VFR BLD AUTO: 38.5 % (ref 37.5–51)
HGB BLD-MCNC: 12.4 G/DL (ref 13–17.7)
IMM GRANULOCYTES # BLD AUTO: 0.02 10*3/MM3 (ref 0–0.05)
IMM GRANULOCYTES NFR BLD AUTO: 0.2 % (ref 0–0.5)
LYMPHOCYTES # BLD AUTO: 1.46 10*3/MM3 (ref 0.7–3.1)
LYMPHOCYTES NFR BLD AUTO: 16.3 % (ref 19.6–45.3)
MCH RBC QN AUTO: 31.8 PG (ref 26.6–33)
MCHC RBC AUTO-ENTMCNC: 32.2 G/DL (ref 31.5–35.7)
MCV RBC AUTO: 98.7 FL (ref 79–97)
MONOCYTES # BLD AUTO: 0.56 10*3/MM3 (ref 0.1–0.9)
MONOCYTES NFR BLD AUTO: 6.3 % (ref 5–12)
NEUTROPHILS # BLD AUTO: 6.49 10*3/MM3 (ref 1.7–7)
NEUTROPHILS NFR BLD AUTO: 72.7 % (ref 42.7–76)
NRBC BLD AUTO-RTO: 0 /100 WBC (ref 0–0.2)
PLATELET # BLD AUTO: 401 10*3/MM3 (ref 140–450)
PMV BLD AUTO: 8.5 FL (ref 6–12)
POTASSIUM BLD-SCNC: 4.2 MMOL/L (ref 3.5–5.2)
PROT SERPL-MCNC: 7.7 G/DL (ref 6–8.5)
RBC # BLD AUTO: 3.9 10*6/MM3 (ref 4.14–5.8)
SODIUM BLD-SCNC: 136 MMOL/L (ref 136–145)
WBC NRBC COR # BLD: 8.93 10*3/MM3 (ref 3.4–10.8)

## 2020-04-13 PROCEDURE — 80053 COMPREHEN METABOLIC PANEL: CPT

## 2020-04-13 PROCEDURE — 36415 COLL VENOUS BLD VENIPUNCTURE: CPT

## 2020-04-13 PROCEDURE — 86140 C-REACTIVE PROTEIN: CPT

## 2020-04-13 PROCEDURE — 85025 COMPLETE CBC W/AUTO DIFF WBC: CPT

## 2020-04-13 PROCEDURE — 85652 RBC SED RATE AUTOMATED: CPT

## 2020-04-20 ENCOUNTER — TRANSCRIBE ORDERS (OUTPATIENT)
Dept: LAB | Facility: HOSPITAL | Age: 60
End: 2020-04-20

## 2020-04-20 ENCOUNTER — LAB (OUTPATIENT)
Dept: LAB | Facility: HOSPITAL | Age: 60
End: 2020-04-20

## 2020-04-20 DIAGNOSIS — L03.317 CELLULITIS AND ABSCESS OF BUTTOCK: ICD-10-CM

## 2020-04-20 DIAGNOSIS — A04.72 INTESTINAL INFECTION DUE TO CLOSTRIDIUM DIFFICILE: ICD-10-CM

## 2020-04-20 DIAGNOSIS — A04.72 INTESTINAL INFECTION DUE TO CLOSTRIDIUM DIFFICILE: Primary | ICD-10-CM

## 2020-04-20 DIAGNOSIS — L02.31 CELLULITIS AND ABSCESS OF BUTTOCK: ICD-10-CM

## 2020-04-20 LAB
ALBUMIN SERPL-MCNC: 4 G/DL (ref 3.5–5.2)
ALBUMIN/GLOB SERPL: 1.2 G/DL
ALP SERPL-CCNC: 44 U/L (ref 39–117)
ALT SERPL W P-5'-P-CCNC: 11 U/L (ref 1–41)
ANION GAP SERPL CALCULATED.3IONS-SCNC: 13.4 MMOL/L (ref 5–15)
AST SERPL-CCNC: 14 U/L (ref 1–40)
BASOPHILS # BLD AUTO: 0.13 10*3/MM3 (ref 0–0.2)
BASOPHILS NFR BLD AUTO: 1.9 % (ref 0–1.5)
BILIRUB SERPL-MCNC: 0.5 MG/DL (ref 0.2–1.2)
BUN BLD-MCNC: 13 MG/DL (ref 8–23)
BUN/CREAT SERPL: 33.3 (ref 7–25)
CALCIUM SPEC-SCNC: 9.5 MG/DL (ref 8.6–10.5)
CHLORIDE SERPL-SCNC: 95 MMOL/L (ref 98–107)
CO2 SERPL-SCNC: 21.6 MMOL/L (ref 22–29)
CREAT BLD-MCNC: 0.39 MG/DL (ref 0.76–1.27)
CRP SERPL-MCNC: 2.9 MG/DL (ref 0–0.5)
DEPRECATED RDW RBC AUTO: 39.5 FL (ref 37–54)
EOSINOPHIL # BLD AUTO: 0.43 10*3/MM3 (ref 0–0.4)
EOSINOPHIL NFR BLD AUTO: 6.2 % (ref 0.3–6.2)
ERYTHROCYTE [DISTWIDTH] IN BLOOD BY AUTOMATED COUNT: 11.6 % (ref 12.3–15.4)
ERYTHROCYTE [SEDIMENTATION RATE] IN BLOOD: 65 MM/HR (ref 0–20)
GFR SERPL CREATININE-BSD FRML MDRD: >150 ML/MIN/1.73
GLOBULIN UR ELPH-MCNC: 3.4 GM/DL
GLUCOSE BLD-MCNC: 94 MG/DL (ref 65–99)
HCT VFR BLD AUTO: 34 % (ref 37.5–51)
HGB BLD-MCNC: 12 G/DL (ref 13–17.7)
IMM GRANULOCYTES # BLD AUTO: 0.02 10*3/MM3 (ref 0–0.05)
IMM GRANULOCYTES NFR BLD AUTO: 0.3 % (ref 0–0.5)
LYMPHOCYTES # BLD AUTO: 1.74 10*3/MM3 (ref 0.7–3.1)
LYMPHOCYTES NFR BLD AUTO: 25.1 % (ref 19.6–45.3)
MCH RBC QN AUTO: 32.8 PG (ref 26.6–33)
MCHC RBC AUTO-ENTMCNC: 35.3 G/DL (ref 31.5–35.7)
MCV RBC AUTO: 92.9 FL (ref 79–97)
MONOCYTES # BLD AUTO: 0.62 10*3/MM3 (ref 0.1–0.9)
MONOCYTES NFR BLD AUTO: 9 % (ref 5–12)
NEUTROPHILS # BLD AUTO: 3.98 10*3/MM3 (ref 1.7–7)
NEUTROPHILS NFR BLD AUTO: 57.5 % (ref 42.7–76)
NRBC BLD AUTO-RTO: 0 /100 WBC (ref 0–0.2)
PLATELET # BLD AUTO: 337 10*3/MM3 (ref 140–450)
PMV BLD AUTO: 9.4 FL (ref 6–12)
POTASSIUM BLD-SCNC: 4.1 MMOL/L (ref 3.5–5.2)
PROT SERPL-MCNC: 7.4 G/DL (ref 6–8.5)
RBC # BLD AUTO: 3.66 10*6/MM3 (ref 4.14–5.8)
SODIUM BLD-SCNC: 130 MMOL/L (ref 136–145)
WBC NRBC COR # BLD: 6.92 10*3/MM3 (ref 3.4–10.8)

## 2020-04-20 PROCEDURE — 85652 RBC SED RATE AUTOMATED: CPT

## 2020-04-20 PROCEDURE — 85025 COMPLETE CBC W/AUTO DIFF WBC: CPT

## 2020-04-20 PROCEDURE — 86140 C-REACTIVE PROTEIN: CPT

## 2020-04-20 PROCEDURE — 80053 COMPREHEN METABOLIC PANEL: CPT | Performed by: INTERNAL MEDICINE

## 2020-04-20 PROCEDURE — 36415 COLL VENOUS BLD VENIPUNCTURE: CPT

## 2020-04-27 ENCOUNTER — TRANSCRIBE ORDERS (OUTPATIENT)
Dept: LAB | Facility: HOSPITAL | Age: 60
End: 2020-04-27

## 2020-04-27 ENCOUNTER — LAB (OUTPATIENT)
Dept: LAB | Facility: HOSPITAL | Age: 60
End: 2020-04-27

## 2020-04-27 DIAGNOSIS — A04.72 INTESTINAL INFECTION DUE TO CLOSTRIDIUM DIFFICILE: ICD-10-CM

## 2020-04-27 DIAGNOSIS — A41.01 METHICILLIN SUSCEPTIBLE STAPHYLOCOCCUS AUREUS SEPTICEMIA (HCC): ICD-10-CM

## 2020-04-27 DIAGNOSIS — A04.72 INTESTINAL INFECTION DUE TO CLOSTRIDIUM DIFFICILE: Primary | ICD-10-CM

## 2020-04-27 LAB
ALBUMIN SERPL-MCNC: 3.6 G/DL (ref 3.5–5.2)
ALBUMIN/GLOB SERPL: 1.2 G/DL
ALP SERPL-CCNC: 47 U/L (ref 39–117)
ALT SERPL W P-5'-P-CCNC: 9 U/L (ref 1–41)
ANION GAP SERPL CALCULATED.3IONS-SCNC: 11.5 MMOL/L (ref 5–15)
AST SERPL-CCNC: 12 U/L (ref 1–40)
BASOPHILS # BLD AUTO: 0.07 10*3/MM3 (ref 0–0.2)
BASOPHILS NFR BLD AUTO: 0.6 % (ref 0–1.5)
BILIRUB SERPL-MCNC: 0.6 MG/DL (ref 0.2–1.2)
BUN BLD-MCNC: 9 MG/DL (ref 8–23)
BUN/CREAT SERPL: 29 (ref 7–25)
CALCIUM SPEC-SCNC: 8.8 MG/DL (ref 8.6–10.5)
CHLORIDE SERPL-SCNC: 93 MMOL/L (ref 98–107)
CO2 SERPL-SCNC: 23.5 MMOL/L (ref 22–29)
CREAT BLD-MCNC: 0.31 MG/DL (ref 0.76–1.27)
CRP SERPL-MCNC: 1.88 MG/DL (ref 0–0.5)
DEPRECATED RDW RBC AUTO: 39.3 FL (ref 37–54)
EOSINOPHIL # BLD AUTO: 0.44 10*3/MM3 (ref 0–0.4)
EOSINOPHIL NFR BLD AUTO: 3.6 % (ref 0.3–6.2)
ERYTHROCYTE [DISTWIDTH] IN BLOOD BY AUTOMATED COUNT: 11.8 % (ref 12.3–15.4)
ERYTHROCYTE [SEDIMENTATION RATE] IN BLOOD: 34 MM/HR (ref 0–20)
GFR SERPL CREATININE-BSD FRML MDRD: >150 ML/MIN/1.73
GLOBULIN UR ELPH-MCNC: 3.1 GM/DL
GLUCOSE BLD-MCNC: 101 MG/DL (ref 65–99)
HCT VFR BLD AUTO: 32.4 % (ref 37.5–51)
HGB BLD-MCNC: 10.9 G/DL (ref 13–17.7)
IMM GRANULOCYTES # BLD AUTO: 0.04 10*3/MM3 (ref 0–0.05)
IMM GRANULOCYTES NFR BLD AUTO: 0.3 % (ref 0–0.5)
LYMPHOCYTES # BLD AUTO: 1.08 10*3/MM3 (ref 0.7–3.1)
LYMPHOCYTES NFR BLD AUTO: 8.7 % (ref 19.6–45.3)
MCH RBC QN AUTO: 31.4 PG (ref 26.6–33)
MCHC RBC AUTO-ENTMCNC: 33.6 G/DL (ref 31.5–35.7)
MCV RBC AUTO: 93.4 FL (ref 79–97)
MONOCYTES # BLD AUTO: 1.01 10*3/MM3 (ref 0.1–0.9)
MONOCYTES NFR BLD AUTO: 8.2 % (ref 5–12)
NEUTROPHILS # BLD AUTO: 9.75 10*3/MM3 (ref 1.7–7)
NEUTROPHILS NFR BLD AUTO: 78.6 % (ref 42.7–76)
NRBC BLD AUTO-RTO: 0 /100 WBC (ref 0–0.2)
PLATELET # BLD AUTO: 306 10*3/MM3 (ref 140–450)
PMV BLD AUTO: 9.7 FL (ref 6–12)
POTASSIUM BLD-SCNC: 4 MMOL/L (ref 3.5–5.2)
PROT SERPL-MCNC: 6.7 G/DL (ref 6–8.5)
RBC # BLD AUTO: 3.47 10*6/MM3 (ref 4.14–5.8)
SODIUM BLD-SCNC: 128 MMOL/L (ref 136–145)
WBC NRBC COR # BLD: 12.39 10*3/MM3 (ref 3.4–10.8)

## 2020-04-27 PROCEDURE — 86140 C-REACTIVE PROTEIN: CPT

## 2020-04-27 PROCEDURE — 85025 COMPLETE CBC W/AUTO DIFF WBC: CPT

## 2020-04-27 PROCEDURE — 85652 RBC SED RATE AUTOMATED: CPT

## 2020-04-27 PROCEDURE — 80053 COMPREHEN METABOLIC PANEL: CPT | Performed by: INTERNAL MEDICINE

## 2020-04-27 PROCEDURE — 36415 COLL VENOUS BLD VENIPUNCTURE: CPT

## 2020-04-29 DIAGNOSIS — L03.317 CELLULITIS AND ABSCESS OF BUTTOCK: ICD-10-CM

## 2020-04-29 DIAGNOSIS — A04.72 C. DIFFICILE DIARRHEA: Primary | ICD-10-CM

## 2020-04-29 DIAGNOSIS — L02.31 CELLULITIS AND ABSCESS OF BUTTOCK: ICD-10-CM

## 2020-05-04 ENCOUNTER — TRANSCRIBE ORDERS (OUTPATIENT)
Dept: LAB | Facility: HOSPITAL | Age: 60
End: 2020-05-04

## 2020-05-04 ENCOUNTER — LAB (OUTPATIENT)
Dept: LAB | Facility: HOSPITAL | Age: 60
End: 2020-05-04

## 2020-05-04 DIAGNOSIS — L02.31 CELLULITIS AND ABSCESS OF BUTTOCK: ICD-10-CM

## 2020-05-04 DIAGNOSIS — A04.72 INTESTINAL INFECTION DUE TO CLOSTRIDIUM DIFFICILE: Primary | ICD-10-CM

## 2020-05-04 DIAGNOSIS — M46.28 ABSCESS OF SACRUM (HCC): ICD-10-CM

## 2020-05-04 DIAGNOSIS — L89.152 STAGE II PRESSURE ULCER OF SACRAL REGION (HCC): ICD-10-CM

## 2020-05-04 DIAGNOSIS — R50.9 HYPERTHERMIA-INDUCED DEFECT: ICD-10-CM

## 2020-05-04 DIAGNOSIS — L03.317 CELLULITIS AND ABSCESS OF BUTTOCK: ICD-10-CM

## 2020-05-04 DIAGNOSIS — A41.01 METHICILLIN SUSCEPTIBLE STAPHYLOCOCCUS AUREUS SEPTICEMIA (HCC): ICD-10-CM

## 2020-05-04 DIAGNOSIS — A04.72 INTESTINAL INFECTION DUE TO CLOSTRIDIUM DIFFICILE: ICD-10-CM

## 2020-05-04 DIAGNOSIS — R19.7 DIARRHEA OF PRESUMED INFECTIOUS ORIGIN: ICD-10-CM

## 2020-05-04 LAB
ALBUMIN SERPL-MCNC: 4 G/DL (ref 3.5–5.2)
ALBUMIN/GLOB SERPL: 1.2 G/DL
ALP SERPL-CCNC: 50 U/L (ref 39–117)
ALT SERPL W P-5'-P-CCNC: 8 U/L (ref 1–41)
ANION GAP SERPL CALCULATED.3IONS-SCNC: 12 MMOL/L (ref 5–15)
AST SERPL-CCNC: 14 U/L (ref 1–40)
BASOPHILS # BLD AUTO: 0.1 10*3/MM3 (ref 0–0.2)
BASOPHILS NFR BLD AUTO: 1.3 % (ref 0–1.5)
BILIRUB SERPL-MCNC: 0.5 MG/DL (ref 0.2–1.2)
BUN BLD-MCNC: 7 MG/DL (ref 8–23)
BUN/CREAT SERPL: 19.4 (ref 7–25)
CALCIUM SPEC-SCNC: 8.7 MG/DL (ref 8.6–10.5)
CHLORIDE SERPL-SCNC: 97 MMOL/L (ref 98–107)
CO2 SERPL-SCNC: 23 MMOL/L (ref 22–29)
CREAT BLD-MCNC: 0.36 MG/DL (ref 0.76–1.27)
CRP SERPL-MCNC: 4.17 MG/DL (ref 0–0.5)
DEPRECATED RDW RBC AUTO: 46.7 FL (ref 37–54)
EOSINOPHIL # BLD AUTO: 0.37 10*3/MM3 (ref 0–0.4)
EOSINOPHIL NFR BLD AUTO: 4.7 % (ref 0.3–6.2)
ERYTHROCYTE [DISTWIDTH] IN BLOOD BY AUTOMATED COUNT: 13.1 % (ref 12.3–15.4)
ERYTHROCYTE [SEDIMENTATION RATE] IN BLOOD: 49 MM/HR (ref 0–20)
GFR SERPL CREATININE-BSD FRML MDRD: >150 ML/MIN/1.73
GLOBULIN UR ELPH-MCNC: 3.3 GM/DL
GLUCOSE BLD-MCNC: 98 MG/DL (ref 65–99)
HCT VFR BLD AUTO: 36.4 % (ref 37.5–51)
HGB BLD-MCNC: 12 G/DL (ref 13–17.7)
IMM GRANULOCYTES # BLD AUTO: 0.03 10*3/MM3 (ref 0–0.05)
IMM GRANULOCYTES NFR BLD AUTO: 0.4 % (ref 0–0.5)
LYMPHOCYTES # BLD AUTO: 1.77 10*3/MM3 (ref 0.7–3.1)
LYMPHOCYTES NFR BLD AUTO: 22.3 % (ref 19.6–45.3)
MCH RBC QN AUTO: 32 PG (ref 26.6–33)
MCHC RBC AUTO-ENTMCNC: 33 G/DL (ref 31.5–35.7)
MCV RBC AUTO: 97.1 FL (ref 79–97)
MONOCYTES # BLD AUTO: 0.92 10*3/MM3 (ref 0.1–0.9)
MONOCYTES NFR BLD AUTO: 11.6 % (ref 5–12)
NEUTROPHILS # BLD AUTO: 4.76 10*3/MM3 (ref 1.7–7)
NEUTROPHILS NFR BLD AUTO: 59.7 % (ref 42.7–76)
NRBC BLD AUTO-RTO: 0 /100 WBC (ref 0–0.2)
PLATELET # BLD AUTO: 276 10*3/MM3 (ref 140–450)
PMV BLD AUTO: 8.8 FL (ref 6–12)
POTASSIUM BLD-SCNC: 4.2 MMOL/L (ref 3.5–5.2)
PROT SERPL-MCNC: 7.3 G/DL (ref 6–8.5)
RBC # BLD AUTO: 3.75 10*6/MM3 (ref 4.14–5.8)
SODIUM BLD-SCNC: 132 MMOL/L (ref 136–145)
WBC NRBC COR # BLD: 7.95 10*3/MM3 (ref 3.4–10.8)

## 2020-05-04 PROCEDURE — 80053 COMPREHEN METABOLIC PANEL: CPT

## 2020-05-04 PROCEDURE — 85652 RBC SED RATE AUTOMATED: CPT

## 2020-05-04 PROCEDURE — 86140 C-REACTIVE PROTEIN: CPT

## 2020-05-04 PROCEDURE — 36415 COLL VENOUS BLD VENIPUNCTURE: CPT

## 2020-05-04 PROCEDURE — 85025 COMPLETE CBC W/AUTO DIFF WBC: CPT

## 2020-05-05 ENCOUNTER — LAB (OUTPATIENT)
Dept: LAB | Facility: HOSPITAL | Age: 60
End: 2020-05-05

## 2020-05-05 DIAGNOSIS — B95.1 GBBS (GROUP B BETA HEMOLYTIC STREPTOCOCCUS) PHARYNGITIS: ICD-10-CM

## 2020-05-05 DIAGNOSIS — J02.0 GBBS (GROUP B BETA HEMOLYTIC STREPTOCOCCUS) PHARYNGITIS: ICD-10-CM

## 2020-05-05 DIAGNOSIS — A41.01 METHICILLIN SUSCEPTIBLE STAPHYLOCOCCUS AUREUS SEPTICEMIA (HCC): ICD-10-CM

## 2020-05-05 DIAGNOSIS — L03.317 CELLULITIS AND ABSCESS OF BUTTOCK: ICD-10-CM

## 2020-05-05 DIAGNOSIS — L02.31 CELLULITIS AND ABSCESS OF BUTTOCK: ICD-10-CM

## 2020-05-05 DIAGNOSIS — L89.152 STAGE II PRESSURE ULCER OF SACRAL REGION (HCC): ICD-10-CM

## 2020-05-05 DIAGNOSIS — A04.72 INTESTINAL INFECTION DUE TO CLOSTRIDIUM DIFFICILE: Primary | ICD-10-CM

## 2020-05-05 PROCEDURE — 87205 SMEAR GRAM STAIN: CPT

## 2020-05-05 PROCEDURE — 87070 CULTURE OTHR SPECIMN AEROBIC: CPT

## 2020-05-08 ENCOUNTER — LAB (OUTPATIENT)
Dept: LAB | Facility: HOSPITAL | Age: 60
End: 2020-05-08

## 2020-05-08 ENCOUNTER — TRANSCRIBE ORDERS (OUTPATIENT)
Dept: LAB | Facility: HOSPITAL | Age: 60
End: 2020-05-08

## 2020-05-08 DIAGNOSIS — A41.01 METHICILLIN SUSCEPTIBLE STAPHYLOCOCCUS AUREUS SEPTICEMIA (HCC): ICD-10-CM

## 2020-05-08 DIAGNOSIS — A41.01 METHICILLIN SUSCEPTIBLE STAPHYLOCOCCUS AUREUS SEPTICEMIA (HCC): Primary | ICD-10-CM

## 2020-05-08 LAB
ALBUMIN SERPL-MCNC: 3.6 G/DL (ref 3.5–5.2)
ALBUMIN/GLOB SERPL: 1.1 G/DL
ALP SERPL-CCNC: 40 U/L (ref 39–117)
ALT SERPL W P-5'-P-CCNC: 9 U/L (ref 1–41)
ANION GAP SERPL CALCULATED.3IONS-SCNC: 12.6 MMOL/L (ref 5–15)
AST SERPL-CCNC: 14 U/L (ref 1–40)
BASOPHILS # BLD AUTO: 0.09 10*3/MM3 (ref 0–0.2)
BASOPHILS NFR BLD AUTO: 1.3 % (ref 0–1.5)
BILIRUB SERPL-MCNC: 0.5 MG/DL (ref 0.2–1.2)
BUN BLD-MCNC: 8 MG/DL (ref 8–23)
BUN/CREAT SERPL: 19.5 (ref 7–25)
CALCIUM SPEC-SCNC: 9.2 MG/DL (ref 8.6–10.5)
CHLORIDE SERPL-SCNC: 99 MMOL/L (ref 98–107)
CO2 SERPL-SCNC: 21.4 MMOL/L (ref 22–29)
CREAT BLD-MCNC: 0.41 MG/DL (ref 0.76–1.27)
CRP SERPL-MCNC: 4.97 MG/DL (ref 0–0.5)
DEPRECATED RDW RBC AUTO: 38.8 FL (ref 37–54)
EOSINOPHIL # BLD AUTO: 0.41 10*3/MM3 (ref 0–0.4)
EOSINOPHIL NFR BLD AUTO: 5.8 % (ref 0.3–6.2)
ERYTHROCYTE [DISTWIDTH] IN BLOOD BY AUTOMATED COUNT: 11.5 % (ref 12.3–15.4)
ERYTHROCYTE [SEDIMENTATION RATE] IN BLOOD: 35 MM/HR (ref 0–20)
GFR SERPL CREATININE-BSD FRML MDRD: >150 ML/MIN/1.73
GLOBULIN UR ELPH-MCNC: 3.2 GM/DL
GLUCOSE BLD-MCNC: 108 MG/DL (ref 65–99)
HCT VFR BLD AUTO: 33.4 % (ref 37.5–51)
HGB BLD-MCNC: 11.1 G/DL (ref 13–17.7)
IMM GRANULOCYTES # BLD AUTO: 0.03 10*3/MM3 (ref 0–0.05)
IMM GRANULOCYTES NFR BLD AUTO: 0.4 % (ref 0–0.5)
LYMPHOCYTES # BLD AUTO: 1.52 10*3/MM3 (ref 0.7–3.1)
LYMPHOCYTES NFR BLD AUTO: 21.4 % (ref 19.6–45.3)
MCH RBC QN AUTO: 30.8 PG (ref 26.6–33)
MCHC RBC AUTO-ENTMCNC: 33.2 G/DL (ref 31.5–35.7)
MCV RBC AUTO: 92.8 FL (ref 79–97)
MONOCYTES # BLD AUTO: 0.73 10*3/MM3 (ref 0.1–0.9)
MONOCYTES NFR BLD AUTO: 10.3 % (ref 5–12)
NEUTROPHILS # BLD AUTO: 4.32 10*3/MM3 (ref 1.7–7)
NEUTROPHILS NFR BLD AUTO: 60.8 % (ref 42.7–76)
NRBC BLD AUTO-RTO: 0 /100 WBC (ref 0–0.2)
PLATELET # BLD AUTO: 269 10*3/MM3 (ref 140–450)
PMV BLD AUTO: 9.7 FL (ref 6–12)
POTASSIUM BLD-SCNC: 4.1 MMOL/L (ref 3.5–5.2)
PROT SERPL-MCNC: 6.8 G/DL (ref 6–8.5)
RBC # BLD AUTO: 3.6 10*6/MM3 (ref 4.14–5.8)
SODIUM BLD-SCNC: 133 MMOL/L (ref 136–145)
WBC NRBC COR # BLD: 7.1 10*3/MM3 (ref 3.4–10.8)

## 2020-05-08 PROCEDURE — 85652 RBC SED RATE AUTOMATED: CPT

## 2020-05-08 PROCEDURE — 86140 C-REACTIVE PROTEIN: CPT

## 2020-05-08 PROCEDURE — 36415 COLL VENOUS BLD VENIPUNCTURE: CPT

## 2020-05-08 PROCEDURE — 80053 COMPREHEN METABOLIC PANEL: CPT | Performed by: INTERNAL MEDICINE

## 2020-05-08 PROCEDURE — 85025 COMPLETE CBC W/AUTO DIFF WBC: CPT

## 2020-05-09 LAB
BACTERIA SPEC AEROBE CULT: NORMAL
GRAM STN SPEC: NORMAL
GRAM STN SPEC: NORMAL

## 2020-05-12 ENCOUNTER — TRANSCRIBE ORDERS (OUTPATIENT)
Dept: ADMINISTRATIVE | Facility: HOSPITAL | Age: 60
End: 2020-05-12

## 2020-05-12 DIAGNOSIS — M46.28 ABSCESS OF SACRUM (HCC): Primary | ICD-10-CM

## 2020-05-21 ENCOUNTER — HOSPITAL ENCOUNTER (OUTPATIENT)
Dept: MRI IMAGING | Facility: HOSPITAL | Age: 60
Discharge: HOME | End: 2020-05-21
Admitting: INTERNAL MEDICINE

## 2020-05-21 ENCOUNTER — LAB (OUTPATIENT)
Dept: LAB | Facility: HOSPITAL | Age: 60
End: 2020-05-21

## 2020-05-21 DIAGNOSIS — L03.317 CELLULITIS AND ABSCESS OF BUTTOCK: ICD-10-CM

## 2020-05-21 DIAGNOSIS — L02.31 CELLULITIS AND ABSCESS OF BUTTOCK: ICD-10-CM

## 2020-05-21 DIAGNOSIS — M46.28 ABSCESS OF SACRUM (HCC): ICD-10-CM

## 2020-05-21 DIAGNOSIS — A04.72 C. DIFFICILE DIARRHEA: ICD-10-CM

## 2020-05-21 LAB
ANION GAP SERPL CALCULATED.3IONS-SCNC: 11.5 MMOL/L (ref 5–15)
BASOPHILS # BLD AUTO: 0.07 10*3/MM3 (ref 0–0.2)
BASOPHILS NFR BLD AUTO: 0.8 % (ref 0–1.5)
BUN BLD-MCNC: 8 MG/DL (ref 8–23)
BUN/CREAT SERPL: 22.9 (ref 7–25)
CALCIUM SPEC-SCNC: 9.5 MG/DL (ref 8.6–10.5)
CHLORIDE SERPL-SCNC: 99 MMOL/L (ref 98–107)
CO2 SERPL-SCNC: 23.5 MMOL/L (ref 22–29)
CREAT BLD-MCNC: 0.35 MG/DL (ref 0.76–1.27)
CRP SERPL-MCNC: 1.32 MG/DL (ref 0–0.5)
DEPRECATED RDW RBC AUTO: 39.6 FL (ref 37–54)
EOSINOPHIL # BLD AUTO: 0.32 10*3/MM3 (ref 0–0.4)
EOSINOPHIL NFR BLD AUTO: 3.8 % (ref 0.3–6.2)
ERYTHROCYTE [DISTWIDTH] IN BLOOD BY AUTOMATED COUNT: 11.9 % (ref 12.3–15.4)
ERYTHROCYTE [SEDIMENTATION RATE] IN BLOOD: 22 MM/HR (ref 0–20)
GFR SERPL CREATININE-BSD FRML MDRD: >150 ML/MIN/1.73
GLUCOSE BLD-MCNC: 96 MG/DL (ref 65–99)
HCT VFR BLD AUTO: 37.9 % (ref 37.5–51)
HGB BLD-MCNC: 12.7 G/DL (ref 13–17.7)
IMM GRANULOCYTES # BLD AUTO: 0.02 10*3/MM3 (ref 0–0.05)
IMM GRANULOCYTES NFR BLD AUTO: 0.2 % (ref 0–0.5)
LYMPHOCYTES # BLD AUTO: 1.38 10*3/MM3 (ref 0.7–3.1)
LYMPHOCYTES NFR BLD AUTO: 16.4 % (ref 19.6–45.3)
MCH RBC QN AUTO: 30.6 PG (ref 26.6–33)
MCHC RBC AUTO-ENTMCNC: 33.5 G/DL (ref 31.5–35.7)
MCV RBC AUTO: 91.3 FL (ref 79–97)
MONOCYTES # BLD AUTO: 0.62 10*3/MM3 (ref 0.1–0.9)
MONOCYTES NFR BLD AUTO: 7.4 % (ref 5–12)
NEUTROPHILS # BLD AUTO: 5.98 10*3/MM3 (ref 1.7–7)
NEUTROPHILS NFR BLD AUTO: 71.4 % (ref 42.7–76)
NRBC BLD AUTO-RTO: 0 /100 WBC (ref 0–0.2)
PLATELET # BLD AUTO: 301 10*3/MM3 (ref 140–450)
PMV BLD AUTO: 10.1 FL (ref 6–12)
POTASSIUM BLD-SCNC: 4.4 MMOL/L (ref 3.5–5.2)
RBC # BLD AUTO: 4.15 10*6/MM3 (ref 4.14–5.8)
SODIUM BLD-SCNC: 134 MMOL/L (ref 136–145)
WBC NRBC COR # BLD: 8.39 10*3/MM3 (ref 3.4–10.8)

## 2020-05-21 PROCEDURE — 80048 BASIC METABOLIC PNL TOTAL CA: CPT

## 2020-05-21 PROCEDURE — 36415 COLL VENOUS BLD VENIPUNCTURE: CPT

## 2020-05-21 PROCEDURE — 72197 MRI PELVIS W/O & W/DYE: CPT

## 2020-05-21 PROCEDURE — 85025 COMPLETE CBC W/AUTO DIFF WBC: CPT

## 2020-05-21 PROCEDURE — A9577 INJ MULTIHANCE: HCPCS | Performed by: INTERNAL MEDICINE

## 2020-05-21 PROCEDURE — 85652 RBC SED RATE AUTOMATED: CPT

## 2020-05-21 PROCEDURE — 86140 C-REACTIVE PROTEIN: CPT

## 2020-05-21 PROCEDURE — 0 GADOBENATE DIMEGLUMINE 529 MG/ML SOLUTION: Performed by: INTERNAL MEDICINE

## 2020-05-21 RX ADMIN — GADOBENATE DIMEGLUMINE 17 ML: 529 INJECTION, SOLUTION INTRAVENOUS at 10:10

## 2020-06-05 ENCOUNTER — LAB (OUTPATIENT)
Dept: LAB | Facility: HOSPITAL | Age: 60
End: 2020-06-05

## 2020-06-05 ENCOUNTER — TRANSCRIBE ORDERS (OUTPATIENT)
Dept: LAB | Facility: HOSPITAL | Age: 60
End: 2020-06-05

## 2020-06-05 DIAGNOSIS — A41.01 METHICILLIN SUSCEPTIBLE STAPHYLOCOCCUS AUREUS SEPTICEMIA (HCC): ICD-10-CM

## 2020-06-05 DIAGNOSIS — L02.31 CELLULITIS AND ABSCESS OF BUTTOCK: ICD-10-CM

## 2020-06-05 DIAGNOSIS — L89.152 STAGE II PRESSURE ULCER OF SACRAL REGION (HCC): ICD-10-CM

## 2020-06-05 DIAGNOSIS — J02.0 GBBS (GROUP B BETA HEMOLYTIC STREPTOCOCCUS) PHARYNGITIS: ICD-10-CM

## 2020-06-05 DIAGNOSIS — B95.1 GBBS (GROUP B BETA HEMOLYTIC STREPTOCOCCUS) PHARYNGITIS: ICD-10-CM

## 2020-06-05 DIAGNOSIS — A04.72 INTESTINAL INFECTION DUE TO CLOSTRIDIUM DIFFICILE: Primary | ICD-10-CM

## 2020-06-05 DIAGNOSIS — L03.317 CELLULITIS AND ABSCESS OF BUTTOCK: ICD-10-CM

## 2020-06-05 LAB
ALBUMIN SERPL-MCNC: 4.1 G/DL (ref 3.5–5.2)
ALBUMIN/GLOB SERPL: 1.4 G/DL
ALP SERPL-CCNC: 46 U/L (ref 39–117)
ALT SERPL W P-5'-P-CCNC: 11 U/L (ref 1–41)
ANION GAP SERPL CALCULATED.3IONS-SCNC: 9.4 MMOL/L (ref 5–15)
AST SERPL-CCNC: 16 U/L (ref 1–40)
BASOPHILS # BLD AUTO: 0.1 10*3/MM3 (ref 0–0.2)
BASOPHILS NFR BLD AUTO: 1.4 % (ref 0–1.5)
BILIRUB SERPL-MCNC: 0.4 MG/DL (ref 0.2–1.2)
BUN BLD-MCNC: 13 MG/DL (ref 8–23)
BUN/CREAT SERPL: 31.7 (ref 7–25)
CALCIUM SPEC-SCNC: 9.2 MG/DL (ref 8.6–10.5)
CHLORIDE SERPL-SCNC: 99 MMOL/L (ref 98–107)
CO2 SERPL-SCNC: 23.6 MMOL/L (ref 22–29)
CREAT BLD-MCNC: 0.41 MG/DL (ref 0.76–1.27)
CRP SERPL-MCNC: 0.94 MG/DL (ref 0–0.5)
DEPRECATED RDW RBC AUTO: 39.4 FL (ref 37–54)
EOSINOPHIL # BLD AUTO: 0.53 10*3/MM3 (ref 0–0.4)
EOSINOPHIL NFR BLD AUTO: 7.7 % (ref 0.3–6.2)
ERYTHROCYTE [DISTWIDTH] IN BLOOD BY AUTOMATED COUNT: 12 % (ref 12.3–15.4)
ERYTHROCYTE [SEDIMENTATION RATE] IN BLOOD: 10 MM/HR (ref 0–20)
GFR SERPL CREATININE-BSD FRML MDRD: >150 ML/MIN/1.73
GLOBULIN UR ELPH-MCNC: 3 GM/DL
GLUCOSE BLD-MCNC: 101 MG/DL (ref 65–99)
HCT VFR BLD AUTO: 39.9 % (ref 37.5–51)
HGB BLD-MCNC: 13.6 G/DL (ref 13–17.7)
IMM GRANULOCYTES # BLD AUTO: 0.02 10*3/MM3 (ref 0–0.05)
IMM GRANULOCYTES NFR BLD AUTO: 0.3 % (ref 0–0.5)
LYMPHOCYTES # BLD AUTO: 1.4 10*3/MM3 (ref 0.7–3.1)
LYMPHOCYTES NFR BLD AUTO: 20.2 % (ref 19.6–45.3)
MCH RBC QN AUTO: 30.8 PG (ref 26.6–33)
MCHC RBC AUTO-ENTMCNC: 34.1 G/DL (ref 31.5–35.7)
MCV RBC AUTO: 90.3 FL (ref 79–97)
MONOCYTES # BLD AUTO: 0.6 10*3/MM3 (ref 0.1–0.9)
MONOCYTES NFR BLD AUTO: 8.7 % (ref 5–12)
NEUTROPHILS # BLD AUTO: 4.27 10*3/MM3 (ref 1.7–7)
NEUTROPHILS NFR BLD AUTO: 61.7 % (ref 42.7–76)
NRBC BLD AUTO-RTO: 0 /100 WBC (ref 0–0.2)
PLATELET # BLD AUTO: 261 10*3/MM3 (ref 140–450)
PMV BLD AUTO: 10.9 FL (ref 6–12)
POTASSIUM BLD-SCNC: 4.4 MMOL/L (ref 3.5–5.2)
PROT SERPL-MCNC: 7.1 G/DL (ref 6–8.5)
RBC # BLD AUTO: 4.42 10*6/MM3 (ref 4.14–5.8)
SODIUM BLD-SCNC: 132 MMOL/L (ref 136–145)
WBC NRBC COR # BLD: 6.92 10*3/MM3 (ref 3.4–10.8)

## 2020-06-05 PROCEDURE — 85025 COMPLETE CBC W/AUTO DIFF WBC: CPT | Performed by: INTERNAL MEDICINE

## 2020-06-05 PROCEDURE — 86140 C-REACTIVE PROTEIN: CPT | Performed by: INTERNAL MEDICINE

## 2020-06-05 PROCEDURE — 80053 COMPREHEN METABOLIC PANEL: CPT | Performed by: INTERNAL MEDICINE

## 2020-06-05 PROCEDURE — 85652 RBC SED RATE AUTOMATED: CPT | Performed by: INTERNAL MEDICINE

## 2020-06-05 PROCEDURE — 36415 COLL VENOUS BLD VENIPUNCTURE: CPT | Performed by: INTERNAL MEDICINE

## 2020-06-18 ENCOUNTER — LAB (OUTPATIENT)
Dept: LAB | Facility: HOSPITAL | Age: 60
End: 2020-06-18

## 2020-06-18 DIAGNOSIS — L02.31 CELLULITIS AND ABSCESS OF BUTTOCK: Primary | ICD-10-CM

## 2020-06-18 DIAGNOSIS — L03.317 CELLULITIS AND ABSCESS OF BUTTOCK: Primary | ICD-10-CM

## 2020-06-18 LAB
ALBUMIN SERPL-MCNC: 4.1 G/DL (ref 3.5–5.2)
ALBUMIN/GLOB SERPL: 1.3 G/DL
ALP SERPL-CCNC: 47 U/L (ref 39–117)
ALT SERPL W P-5'-P-CCNC: 10 U/L (ref 1–41)
ANION GAP SERPL CALCULATED.3IONS-SCNC: 12 MMOL/L (ref 5–15)
AST SERPL-CCNC: 14 U/L (ref 1–40)
BASOPHILS # BLD AUTO: 0.08 10*3/MM3 (ref 0–0.2)
BASOPHILS NFR BLD AUTO: 1.4 % (ref 0–1.5)
BILIRUB SERPL-MCNC: 0.4 MG/DL (ref 0.2–1.2)
BUN BLD-MCNC: 9 MG/DL (ref 8–23)
BUN/CREAT SERPL: 22 (ref 7–25)
CALCIUM SPEC-SCNC: 9.8 MG/DL (ref 8.6–10.5)
CHLORIDE SERPL-SCNC: 97 MMOL/L (ref 98–107)
CO2 SERPL-SCNC: 24 MMOL/L (ref 22–29)
CREAT BLD-MCNC: 0.41 MG/DL (ref 0.76–1.27)
CRP SERPL-MCNC: 1.12 MG/DL (ref 0–0.5)
DEPRECATED RDW RBC AUTO: 47.7 FL (ref 37–54)
EOSINOPHIL # BLD AUTO: 0.39 10*3/MM3 (ref 0–0.4)
EOSINOPHIL NFR BLD AUTO: 6.7 % (ref 0.3–6.2)
ERYTHROCYTE [DISTWIDTH] IN BLOOD BY AUTOMATED COUNT: 13.9 % (ref 12.3–15.4)
ERYTHROCYTE [SEDIMENTATION RATE] IN BLOOD: 9 MM/HR (ref 0–20)
GFR SERPL CREATININE-BSD FRML MDRD: >150 ML/MIN/1.73
GLOBULIN UR ELPH-MCNC: 3.1 GM/DL
GLUCOSE BLD-MCNC: 110 MG/DL (ref 65–99)
HCT VFR BLD AUTO: 40.1 % (ref 37.5–51)
HGB BLD-MCNC: 12.7 G/DL (ref 13–17.7)
IMM GRANULOCYTES # BLD AUTO: 0.02 10*3/MM3 (ref 0–0.05)
IMM GRANULOCYTES NFR BLD AUTO: 0.3 % (ref 0–0.5)
LYMPHOCYTES # BLD AUTO: 1.15 10*3/MM3 (ref 0.7–3.1)
LYMPHOCYTES NFR BLD AUTO: 19.9 % (ref 19.6–45.3)
MCH RBC QN AUTO: 29.8 PG (ref 26.6–33)
MCHC RBC AUTO-ENTMCNC: 31.7 G/DL (ref 31.5–35.7)
MCV RBC AUTO: 94.1 FL (ref 79–97)
MONOCYTES # BLD AUTO: 0.59 10*3/MM3 (ref 0.1–0.9)
MONOCYTES NFR BLD AUTO: 10.2 % (ref 5–12)
NEUTROPHILS # BLD AUTO: 3.55 10*3/MM3 (ref 1.7–7)
NEUTROPHILS NFR BLD AUTO: 61.5 % (ref 42.7–76)
NRBC BLD AUTO-RTO: 0 /100 WBC (ref 0–0.2)
PLATELET # BLD AUTO: 278 10*3/MM3 (ref 140–450)
PMV BLD AUTO: 10.3 FL (ref 6–12)
POTASSIUM BLD-SCNC: 4.2 MMOL/L (ref 3.5–5.2)
PROT SERPL-MCNC: 7.2 G/DL (ref 6–8.5)
RBC # BLD AUTO: 4.26 10*6/MM3 (ref 4.14–5.8)
SODIUM BLD-SCNC: 133 MMOL/L (ref 136–145)
WBC NRBC COR # BLD: 5.78 10*3/MM3 (ref 3.4–10.8)

## 2020-06-18 PROCEDURE — 85652 RBC SED RATE AUTOMATED: CPT

## 2020-06-18 PROCEDURE — 80053 COMPREHEN METABOLIC PANEL: CPT

## 2020-06-18 PROCEDURE — 86140 C-REACTIVE PROTEIN: CPT

## 2020-06-18 PROCEDURE — 85025 COMPLETE CBC W/AUTO DIFF WBC: CPT

## 2020-06-18 PROCEDURE — 36415 COLL VENOUS BLD VENIPUNCTURE: CPT

## 2020-07-02 ENCOUNTER — LAB (OUTPATIENT)
Dept: LAB | Facility: HOSPITAL | Age: 60
End: 2020-07-02

## 2020-07-02 ENCOUNTER — TRANSCRIBE ORDERS (OUTPATIENT)
Dept: LAB | Facility: HOSPITAL | Age: 60
End: 2020-07-02

## 2020-07-02 DIAGNOSIS — L03.317 CELLULITIS AND ABSCESS OF BUTTOCK: ICD-10-CM

## 2020-07-02 DIAGNOSIS — L02.31 CELLULITIS AND ABSCESS OF BUTTOCK: ICD-10-CM

## 2020-07-02 DIAGNOSIS — A41.01 METHICILLIN SUSCEPTIBLE STAPHYLOCOCCUS AUREUS SEPTICEMIA (HCC): ICD-10-CM

## 2020-07-02 DIAGNOSIS — A04.72 INTESTINAL INFECTION DUE TO CLOSTRIDIUM DIFFICILE: ICD-10-CM

## 2020-07-02 DIAGNOSIS — B95.1 GBBS (GROUP B BETA HEMOLYTIC STREPTOCOCCUS) PHARYNGITIS: ICD-10-CM

## 2020-07-02 DIAGNOSIS — J02.0 GBBS (GROUP B BETA HEMOLYTIC STREPTOCOCCUS) PHARYNGITIS: ICD-10-CM

## 2020-07-02 DIAGNOSIS — L89.152 STAGE II PRESSURE ULCER OF SACRAL REGION (HCC): ICD-10-CM

## 2020-07-02 DIAGNOSIS — A04.72 INTESTINAL INFECTION DUE TO CLOSTRIDIUM DIFFICILE: Primary | ICD-10-CM

## 2020-07-02 LAB
ALBUMIN SERPL-MCNC: 3.9 G/DL (ref 3.5–5.2)
ALBUMIN/GLOB SERPL: 1.4 G/DL
ALP SERPL-CCNC: 48 U/L (ref 39–117)
ALT SERPL W P-5'-P-CCNC: 9 U/L (ref 1–41)
ANION GAP SERPL CALCULATED.3IONS-SCNC: 12 MMOL/L (ref 5–15)
AST SERPL-CCNC: 14 U/L (ref 1–40)
BASOPHILS # BLD AUTO: 0.08 10*3/MM3 (ref 0–0.2)
BASOPHILS NFR BLD AUTO: 1.1 % (ref 0–1.5)
BILIRUB SERPL-MCNC: 0.4 MG/DL (ref 0.2–1.2)
BUN SERPL-MCNC: 8 MG/DL (ref 8–23)
BUN/CREAT SERPL: 21.6 (ref 7–25)
CALCIUM SPEC-SCNC: 8.7 MG/DL (ref 8.6–10.5)
CHLORIDE SERPL-SCNC: 98 MMOL/L (ref 98–107)
CO2 SERPL-SCNC: 23 MMOL/L (ref 22–29)
CREAT SERPL-MCNC: 0.37 MG/DL (ref 0.76–1.27)
CRP SERPL-MCNC: 1.15 MG/DL (ref 0–0.5)
DEPRECATED RDW RBC AUTO: 48.2 FL (ref 37–54)
EOSINOPHIL # BLD AUTO: 0.44 10*3/MM3 (ref 0–0.4)
EOSINOPHIL NFR BLD AUTO: 6.3 % (ref 0.3–6.2)
ERYTHROCYTE [DISTWIDTH] IN BLOOD BY AUTOMATED COUNT: 13.9 % (ref 12.3–15.4)
ERYTHROCYTE [SEDIMENTATION RATE] IN BLOOD: 24 MM/HR (ref 0–20)
GFR SERPL CREATININE-BSD FRML MDRD: >150 ML/MIN/1.73
GLOBULIN UR ELPH-MCNC: 2.7 GM/DL
GLUCOSE SERPL-MCNC: 84 MG/DL (ref 65–99)
HCT VFR BLD AUTO: 38.7 % (ref 37.5–51)
HGB BLD-MCNC: 12.5 G/DL (ref 13–17.7)
IMM GRANULOCYTES # BLD AUTO: 0.01 10*3/MM3 (ref 0–0.05)
IMM GRANULOCYTES NFR BLD AUTO: 0.1 % (ref 0–0.5)
LYMPHOCYTES # BLD AUTO: 1.09 10*3/MM3 (ref 0.7–3.1)
LYMPHOCYTES NFR BLD AUTO: 15.7 % (ref 19.6–45.3)
MCH RBC QN AUTO: 30.6 PG (ref 26.6–33)
MCHC RBC AUTO-ENTMCNC: 32.3 G/DL (ref 31.5–35.7)
MCV RBC AUTO: 94.9 FL (ref 79–97)
MONOCYTES # BLD AUTO: 0.7 10*3/MM3 (ref 0.1–0.9)
MONOCYTES NFR BLD AUTO: 10.1 % (ref 5–12)
NEUTROPHILS NFR BLD AUTO: 4.64 10*3/MM3 (ref 1.7–7)
NEUTROPHILS NFR BLD AUTO: 66.7 % (ref 42.7–76)
NRBC BLD AUTO-RTO: 0 /100 WBC (ref 0–0.2)
PLATELET # BLD AUTO: 228 10*3/MM3 (ref 140–450)
PMV BLD AUTO: 10.3 FL (ref 6–12)
POTASSIUM SERPL-SCNC: 4.2 MMOL/L (ref 3.5–5.2)
PROT SERPL-MCNC: 6.6 G/DL (ref 6–8.5)
RBC # BLD AUTO: 4.08 10*6/MM3 (ref 4.14–5.8)
SODIUM SERPL-SCNC: 133 MMOL/L (ref 136–145)
WBC # BLD AUTO: 6.96 10*3/MM3 (ref 3.4–10.8)

## 2020-07-02 PROCEDURE — 80053 COMPREHEN METABOLIC PANEL: CPT

## 2020-07-02 PROCEDURE — 85025 COMPLETE CBC W/AUTO DIFF WBC: CPT

## 2020-07-02 PROCEDURE — 85652 RBC SED RATE AUTOMATED: CPT

## 2020-07-02 PROCEDURE — 86140 C-REACTIVE PROTEIN: CPT

## 2020-07-16 ENCOUNTER — LAB (OUTPATIENT)
Dept: LAB | Facility: HOSPITAL | Age: 60
End: 2020-07-16

## 2020-07-16 ENCOUNTER — TRANSCRIBE ORDERS (OUTPATIENT)
Dept: LAB | Facility: HOSPITAL | Age: 60
End: 2020-07-16

## 2020-07-16 DIAGNOSIS — L89.152 STAGE II PRESSURE ULCER OF SACRAL REGION (HCC): ICD-10-CM

## 2020-07-16 DIAGNOSIS — A04.72 INTESTINAL INFECTION DUE TO CLOSTRIDIUM DIFFICILE: ICD-10-CM

## 2020-07-16 DIAGNOSIS — L02.31 CELLULITIS AND ABSCESS OF BUTTOCK: ICD-10-CM

## 2020-07-16 DIAGNOSIS — L73.9 STAPHYLOCOCCUS AUREUS SUPERFICIAL FOLLICULITIS: ICD-10-CM

## 2020-07-16 DIAGNOSIS — M46.28 ABSCESS OF SACRUM (HCC): ICD-10-CM

## 2020-07-16 DIAGNOSIS — L03.317 CELLULITIS AND ABSCESS OF BUTTOCK: ICD-10-CM

## 2020-07-16 DIAGNOSIS — B95.61 STAPHYLOCOCCUS AUREUS SUPERFICIAL FOLLICULITIS: ICD-10-CM

## 2020-07-16 DIAGNOSIS — A04.72 INTESTINAL INFECTION DUE TO CLOSTRIDIUM DIFFICILE: Primary | ICD-10-CM

## 2020-07-16 LAB
ALBUMIN SERPL-MCNC: 4.1 G/DL (ref 3.5–5.2)
ALBUMIN/GLOB SERPL: 1.3 G/DL
ALP SERPL-CCNC: 51 U/L (ref 39–117)
ALT SERPL W P-5'-P-CCNC: 10 U/L (ref 1–41)
ANION GAP SERPL CALCULATED.3IONS-SCNC: 10.8 MMOL/L (ref 5–15)
AST SERPL-CCNC: 16 U/L (ref 1–40)
BASOPHILS # BLD AUTO: 0.08 10*3/MM3 (ref 0–0.2)
BASOPHILS NFR BLD AUTO: 1.1 % (ref 0–1.5)
BILIRUB SERPL-MCNC: 0.3 MG/DL (ref 0–1.2)
BUN SERPL-MCNC: 7 MG/DL (ref 8–23)
BUN/CREAT SERPL: 18.4 (ref 7–25)
CALCIUM SPEC-SCNC: 9.7 MG/DL (ref 8.6–10.5)
CHLORIDE SERPL-SCNC: 99 MMOL/L (ref 98–107)
CO2 SERPL-SCNC: 25.2 MMOL/L (ref 22–29)
CREAT SERPL-MCNC: 0.38 MG/DL (ref 0.76–1.27)
CRP SERPL-MCNC: 1.04 MG/DL (ref 0–0.5)
DEPRECATED RDW RBC AUTO: 40.7 FL (ref 37–54)
EOSINOPHIL # BLD AUTO: 0.37 10*3/MM3 (ref 0–0.4)
EOSINOPHIL NFR BLD AUTO: 5.2 % (ref 0.3–6.2)
ERYTHROCYTE [DISTWIDTH] IN BLOOD BY AUTOMATED COUNT: 12.7 % (ref 12.3–15.4)
ERYTHROCYTE [SEDIMENTATION RATE] IN BLOOD: 11 MM/HR (ref 0–20)
GFR SERPL CREATININE-BSD FRML MDRD: >150 ML/MIN/1.73
GLOBULIN UR ELPH-MCNC: 3.2 GM/DL
GLUCOSE SERPL-MCNC: 89 MG/DL (ref 65–99)
HCT VFR BLD AUTO: 40.5 % (ref 37.5–51)
HGB BLD-MCNC: 13.9 G/DL (ref 13–17.7)
IMM GRANULOCYTES # BLD AUTO: 0.02 10*3/MM3 (ref 0–0.05)
IMM GRANULOCYTES NFR BLD AUTO: 0.3 % (ref 0–0.5)
LYMPHOCYTES # BLD AUTO: 1.22 10*3/MM3 (ref 0.7–3.1)
LYMPHOCYTES NFR BLD AUTO: 17 % (ref 19.6–45.3)
MCH RBC QN AUTO: 30.3 PG (ref 26.6–33)
MCHC RBC AUTO-ENTMCNC: 34.3 G/DL (ref 31.5–35.7)
MCV RBC AUTO: 88.2 FL (ref 79–97)
MONOCYTES # BLD AUTO: 0.72 10*3/MM3 (ref 0.1–0.9)
MONOCYTES NFR BLD AUTO: 10 % (ref 5–12)
NEUTROPHILS NFR BLD AUTO: 4.76 10*3/MM3 (ref 1.7–7)
NEUTROPHILS NFR BLD AUTO: 66.4 % (ref 42.7–76)
NRBC BLD AUTO-RTO: 0 /100 WBC (ref 0–0.2)
PLATELET # BLD AUTO: 278 10*3/MM3 (ref 140–450)
PMV BLD AUTO: 10.5 FL (ref 6–12)
POTASSIUM SERPL-SCNC: 4.2 MMOL/L (ref 3.5–5.2)
PROT SERPL-MCNC: 7.3 G/DL (ref 6–8.5)
RBC # BLD AUTO: 4.59 10*6/MM3 (ref 4.14–5.8)
SODIUM SERPL-SCNC: 135 MMOL/L (ref 136–145)
WBC # BLD AUTO: 7.17 10*3/MM3 (ref 3.4–10.8)

## 2020-07-16 PROCEDURE — 36415 COLL VENOUS BLD VENIPUNCTURE: CPT

## 2020-07-16 PROCEDURE — 85652 RBC SED RATE AUTOMATED: CPT

## 2020-07-16 PROCEDURE — 80053 COMPREHEN METABOLIC PANEL: CPT

## 2020-07-16 PROCEDURE — 86140 C-REACTIVE PROTEIN: CPT

## 2020-07-16 PROCEDURE — 85025 COMPLETE CBC W/AUTO DIFF WBC: CPT

## 2020-07-30 ENCOUNTER — LAB (OUTPATIENT)
Dept: LAB | Facility: HOSPITAL | Age: 60
End: 2020-07-30

## 2020-07-30 ENCOUNTER — TRANSCRIBE ORDERS (OUTPATIENT)
Dept: LAB | Facility: HOSPITAL | Age: 60
End: 2020-07-30

## 2020-07-30 DIAGNOSIS — L03.317 CELLULITIS AND ABSCESS OF BUTTOCK: ICD-10-CM

## 2020-07-30 DIAGNOSIS — B95.61 STAPHYLOCOCCUS AUREUS SUPERFICIAL FOLLICULITIS: ICD-10-CM

## 2020-07-30 DIAGNOSIS — L89.152 STAGE II PRESSURE ULCER OF SACRAL REGION (HCC): ICD-10-CM

## 2020-07-30 DIAGNOSIS — A04.72 INTESTINAL INFECTION DUE TO CLOSTRIDIUM DIFFICILE: ICD-10-CM

## 2020-07-30 DIAGNOSIS — Z00.00 PREVENTATIVE HEALTH CARE: ICD-10-CM

## 2020-07-30 DIAGNOSIS — Z00.00 PREVENTATIVE HEALTH CARE: Primary | ICD-10-CM

## 2020-07-30 DIAGNOSIS — L73.9 STAPHYLOCOCCUS AUREUS SUPERFICIAL FOLLICULITIS: ICD-10-CM

## 2020-07-30 DIAGNOSIS — L02.31 CELLULITIS AND ABSCESS OF BUTTOCK: ICD-10-CM

## 2020-07-30 DIAGNOSIS — A04.72 INTESTINAL INFECTION DUE TO CLOSTRIDIUM DIFFICILE: Primary | ICD-10-CM

## 2020-07-30 DIAGNOSIS — M46.28 ABSCESS OF SACRUM (HCC): ICD-10-CM

## 2020-07-30 LAB
ABO GROUP BLD: NORMAL
ALBUMIN SERPL-MCNC: 3.9 G/DL (ref 3.5–5.2)
ALBUMIN/GLOB SERPL: 1.2 G/DL
ALP SERPL-CCNC: 46 U/L (ref 39–117)
ALT SERPL W P-5'-P-CCNC: 11 U/L (ref 1–41)
ANION GAP SERPL CALCULATED.3IONS-SCNC: 11.4 MMOL/L (ref 5–15)
AST SERPL-CCNC: 19 U/L (ref 1–40)
BASOPHILS # BLD AUTO: 0.08 10*3/MM3 (ref 0–0.2)
BASOPHILS NFR BLD AUTO: 1.3 % (ref 0–1.5)
BILIRUB SERPL-MCNC: 0.4 MG/DL (ref 0–1.2)
BUN SERPL-MCNC: 7 MG/DL (ref 8–23)
BUN/CREAT SERPL: 19.4 (ref 7–25)
CALCIUM SPEC-SCNC: 9.3 MG/DL (ref 8.6–10.5)
CHLORIDE SERPL-SCNC: 98 MMOL/L (ref 98–107)
CO2 SERPL-SCNC: 21.6 MMOL/L (ref 22–29)
CREAT SERPL-MCNC: 0.36 MG/DL (ref 0.76–1.27)
CRP SERPL-MCNC: 0.78 MG/DL (ref 0–0.5)
DEPRECATED RDW RBC AUTO: 41.7 FL (ref 37–54)
EOSINOPHIL # BLD AUTO: 0.47 10*3/MM3 (ref 0–0.4)
EOSINOPHIL NFR BLD AUTO: 7.4 % (ref 0.3–6.2)
ERYTHROCYTE [DISTWIDTH] IN BLOOD BY AUTOMATED COUNT: 12.9 % (ref 12.3–15.4)
ERYTHROCYTE [SEDIMENTATION RATE] IN BLOOD: 8 MM/HR (ref 0–20)
GFR SERPL CREATININE-BSD FRML MDRD: >150 ML/MIN/1.73
GLOBULIN UR ELPH-MCNC: 3.2 GM/DL
GLUCOSE SERPL-MCNC: 84 MG/DL (ref 65–99)
HCT VFR BLD AUTO: 41.2 % (ref 37.5–51)
HGB BLD-MCNC: 14 G/DL (ref 13–17.7)
IMM GRANULOCYTES # BLD AUTO: 0.02 10*3/MM3 (ref 0–0.05)
IMM GRANULOCYTES NFR BLD AUTO: 0.3 % (ref 0–0.5)
LYMPHOCYTES # BLD AUTO: 1.28 10*3/MM3 (ref 0.7–3.1)
LYMPHOCYTES NFR BLD AUTO: 20.2 % (ref 19.6–45.3)
MCH RBC QN AUTO: 30 PG (ref 26.6–33)
MCHC RBC AUTO-ENTMCNC: 34 G/DL (ref 31.5–35.7)
MCV RBC AUTO: 88.4 FL (ref 79–97)
MONOCYTES # BLD AUTO: 0.65 10*3/MM3 (ref 0.1–0.9)
MONOCYTES NFR BLD AUTO: 10.3 % (ref 5–12)
NEUTROPHILS NFR BLD AUTO: 3.84 10*3/MM3 (ref 1.7–7)
NEUTROPHILS NFR BLD AUTO: 60.5 % (ref 42.7–76)
NRBC BLD AUTO-RTO: 0 /100 WBC (ref 0–0.2)
PLATELET # BLD AUTO: 287 10*3/MM3 (ref 140–450)
PMV BLD AUTO: 10.1 FL (ref 6–12)
POTASSIUM SERPL-SCNC: 4.4 MMOL/L (ref 3.5–5.2)
PROT SERPL-MCNC: 7.1 G/DL (ref 6–8.5)
RBC # BLD AUTO: 4.66 10*6/MM3 (ref 4.14–5.8)
RH BLD: POSITIVE
SODIUM SERPL-SCNC: 131 MMOL/L (ref 136–145)
WBC # BLD AUTO: 6.34 10*3/MM3 (ref 3.4–10.8)

## 2020-07-30 PROCEDURE — 86140 C-REACTIVE PROTEIN: CPT

## 2020-07-30 PROCEDURE — 86900 BLOOD TYPING SEROLOGIC ABO: CPT

## 2020-07-30 PROCEDURE — 86901 BLOOD TYPING SEROLOGIC RH(D): CPT

## 2020-07-30 PROCEDURE — 36415 COLL VENOUS BLD VENIPUNCTURE: CPT

## 2020-07-30 PROCEDURE — 85025 COMPLETE CBC W/AUTO DIFF WBC: CPT

## 2020-07-30 PROCEDURE — 80053 COMPREHEN METABOLIC PANEL: CPT

## 2020-07-30 PROCEDURE — 85652 RBC SED RATE AUTOMATED: CPT

## 2020-08-17 ENCOUNTER — TRANSCRIBE ORDERS (OUTPATIENT)
Dept: LAB | Facility: HOSPITAL | Age: 60
End: 2020-08-17

## 2020-08-17 ENCOUNTER — LAB (OUTPATIENT)
Dept: LAB | Facility: HOSPITAL | Age: 60
End: 2020-08-17

## 2020-08-17 DIAGNOSIS — A04.72 INTESTINAL INFECTION DUE TO CLOSTRIDIUM DIFFICILE: ICD-10-CM

## 2020-08-17 DIAGNOSIS — A04.72 INTESTINAL INFECTION DUE TO CLOSTRIDIUM DIFFICILE: Primary | ICD-10-CM

## 2020-08-17 DIAGNOSIS — L02.31 CELLULITIS AND ABSCESS OF BUTTOCK: ICD-10-CM

## 2020-08-17 DIAGNOSIS — L03.317 CELLULITIS AND ABSCESS OF BUTTOCK: ICD-10-CM

## 2020-08-17 DIAGNOSIS — L73.9 STAPHYLOCOCCUS AUREUS SUPERFICIAL FOLLICULITIS: ICD-10-CM

## 2020-08-17 DIAGNOSIS — M46.28 ABSCESS OF SACRUM (HCC): ICD-10-CM

## 2020-08-17 DIAGNOSIS — B95.61 STAPHYLOCOCCUS AUREUS SUPERFICIAL FOLLICULITIS: ICD-10-CM

## 2020-08-17 LAB
ALBUMIN SERPL-MCNC: 4 G/DL (ref 3.5–5.2)
ALBUMIN/GLOB SERPL: 1.4 G/DL
ALP SERPL-CCNC: 55 U/L (ref 39–117)
ALT SERPL W P-5'-P-CCNC: 13 U/L (ref 1–41)
ANION GAP SERPL CALCULATED.3IONS-SCNC: 9 MMOL/L (ref 5–15)
AST SERPL-CCNC: 18 U/L (ref 1–40)
BASOPHILS # BLD AUTO: 0.06 10*3/MM3 (ref 0–0.2)
BASOPHILS NFR BLD AUTO: 1.1 % (ref 0–1.5)
BILIRUB SERPL-MCNC: 0.6 MG/DL (ref 0–1.2)
BUN SERPL-MCNC: 8 MG/DL (ref 8–23)
BUN/CREAT SERPL: 18.2 (ref 7–25)
CALCIUM SPEC-SCNC: 8.8 MG/DL (ref 8.6–10.5)
CHLORIDE SERPL-SCNC: 100 MMOL/L (ref 98–107)
CO2 SERPL-SCNC: 24 MMOL/L (ref 22–29)
CREAT SERPL-MCNC: 0.44 MG/DL (ref 0.76–1.27)
CRP SERPL-MCNC: 0.71 MG/DL (ref 0–0.5)
DEPRECATED RDW RBC AUTO: 47.6 FL (ref 37–54)
EOSINOPHIL # BLD AUTO: 0.43 10*3/MM3 (ref 0–0.4)
EOSINOPHIL NFR BLD AUTO: 8.2 % (ref 0.3–6.2)
ERYTHROCYTE [DISTWIDTH] IN BLOOD BY AUTOMATED COUNT: 14.5 % (ref 12.3–15.4)
ERYTHROCYTE [SEDIMENTATION RATE] IN BLOOD: 14 MM/HR (ref 0–20)
GFR SERPL CREATININE-BSD FRML MDRD: >150 ML/MIN/1.73
GLOBULIN UR ELPH-MCNC: 2.9 GM/DL
GLUCOSE SERPL-MCNC: 84 MG/DL (ref 65–99)
HCT VFR BLD AUTO: 41.7 % (ref 37.5–51)
HGB BLD-MCNC: 13.3 G/DL (ref 13–17.7)
IMM GRANULOCYTES # BLD AUTO: 0.01 10*3/MM3 (ref 0–0.05)
IMM GRANULOCYTES NFR BLD AUTO: 0.2 % (ref 0–0.5)
LYMPHOCYTES # BLD AUTO: 1.37 10*3/MM3 (ref 0.7–3.1)
LYMPHOCYTES NFR BLD AUTO: 26.2 % (ref 19.6–45.3)
MCH RBC QN AUTO: 28.7 PG (ref 26.6–33)
MCHC RBC AUTO-ENTMCNC: 31.9 G/DL (ref 31.5–35.7)
MCV RBC AUTO: 90.1 FL (ref 79–97)
MONOCYTES # BLD AUTO: 0.59 10*3/MM3 (ref 0.1–0.9)
MONOCYTES NFR BLD AUTO: 11.3 % (ref 5–12)
NEUTROPHILS NFR BLD AUTO: 2.76 10*3/MM3 (ref 1.7–7)
NEUTROPHILS NFR BLD AUTO: 53 % (ref 42.7–76)
NRBC BLD AUTO-RTO: 0 /100 WBC (ref 0–0.2)
PLATELET # BLD AUTO: 219 10*3/MM3 (ref 140–450)
PMV BLD AUTO: 9.3 FL (ref 6–12)
POTASSIUM SERPL-SCNC: 4 MMOL/L (ref 3.5–5.2)
PROT SERPL-MCNC: 6.9 G/DL (ref 6–8.5)
RBC # BLD AUTO: 4.63 10*6/MM3 (ref 4.14–5.8)
SODIUM SERPL-SCNC: 133 MMOL/L (ref 136–145)
WBC # BLD AUTO: 5.22 10*3/MM3 (ref 3.4–10.8)

## 2020-08-17 PROCEDURE — 80053 COMPREHEN METABOLIC PANEL: CPT

## 2020-08-17 PROCEDURE — 36415 COLL VENOUS BLD VENIPUNCTURE: CPT

## 2020-08-17 PROCEDURE — 85025 COMPLETE CBC W/AUTO DIFF WBC: CPT

## 2020-08-17 PROCEDURE — 85652 RBC SED RATE AUTOMATED: CPT

## 2020-08-17 PROCEDURE — 86140 C-REACTIVE PROTEIN: CPT

## 2020-08-26 ENCOUNTER — LAB REQUISITION (OUTPATIENT)
Dept: LAB | Facility: HOSPITAL | Age: 60
End: 2020-08-26

## 2020-08-26 DIAGNOSIS — G82.20 PARAPLEGIA, UNSPECIFIED (HCC): ICD-10-CM

## 2020-08-26 DIAGNOSIS — A04.72 ENTEROCOLITIS DUE TO CLOSTRIDIUM DIFFICILE, NOT SPECIFIED AS RECURRENT: ICD-10-CM

## 2020-08-26 DIAGNOSIS — M46.28 OSTEOMYELITIS OF VERTEBRA, SACRAL AND SACROCOCCYGEAL REGION (HCC): ICD-10-CM

## 2020-08-26 DIAGNOSIS — Z00.00 ROUTINE GENERAL MEDICAL EXAMINATION AT A HEALTH CARE FACILITY: ICD-10-CM

## 2020-08-26 DIAGNOSIS — L89.152 PRESSURE ULCER OF SACRAL REGION, STAGE 2 (HCC): ICD-10-CM

## 2020-08-26 LAB — C DIFF TOX GENS STL QL NAA+PROBE: DETECTED

## 2020-08-26 PROCEDURE — 87493 C DIFF AMPLIFIED PROBE: CPT | Performed by: INTERNAL MEDICINE

## 2020-09-17 ENCOUNTER — TRANSCRIBE ORDERS (OUTPATIENT)
Dept: LAB | Facility: HOSPITAL | Age: 60
End: 2020-09-17

## 2020-09-17 ENCOUNTER — LAB (OUTPATIENT)
Dept: LAB | Facility: HOSPITAL | Age: 60
End: 2020-09-17

## 2020-09-17 DIAGNOSIS — A04.72 INTESTINAL INFECTION DUE TO CLOSTRIDIUM DIFFICILE: ICD-10-CM

## 2020-09-17 DIAGNOSIS — M46.28 ABSCESS OF SACRUM (HCC): ICD-10-CM

## 2020-09-17 DIAGNOSIS — B95.61 STAPHYLOCOCCUS AUREUS SUPERFICIAL FOLLICULITIS: ICD-10-CM

## 2020-09-17 DIAGNOSIS — L73.9 STAPHYLOCOCCUS AUREUS SUPERFICIAL FOLLICULITIS: ICD-10-CM

## 2020-09-17 DIAGNOSIS — G82.20 PARAPLEGIA (HCC): ICD-10-CM

## 2020-09-17 DIAGNOSIS — L89.152 STAGE II PRESSURE ULCER OF SACRAL REGION (HCC): ICD-10-CM

## 2020-09-17 DIAGNOSIS — A04.72 INTESTINAL INFECTION DUE TO CLOSTRIDIUM DIFFICILE: Primary | ICD-10-CM

## 2020-09-17 LAB
ALBUMIN SERPL-MCNC: 3.7 G/DL (ref 3.5–5.2)
ALBUMIN/GLOB SERPL: 1.3 G/DL
ALP SERPL-CCNC: 48 U/L (ref 39–117)
ALT SERPL W P-5'-P-CCNC: 10 U/L (ref 1–41)
ANION GAP SERPL CALCULATED.3IONS-SCNC: 10 MMOL/L (ref 5–15)
AST SERPL-CCNC: 15 U/L (ref 1–40)
BASOPHILS # BLD AUTO: 0.09 10*3/MM3 (ref 0–0.2)
BASOPHILS NFR BLD AUTO: 1.2 % (ref 0–1.5)
BILIRUB SERPL-MCNC: 0.4 MG/DL (ref 0–1.2)
BUN SERPL-MCNC: 8 MG/DL (ref 8–23)
BUN/CREAT SERPL: 18.2 (ref 7–25)
CALCIUM SPEC-SCNC: 9.2 MG/DL (ref 8.6–10.5)
CHLORIDE SERPL-SCNC: 96 MMOL/L (ref 98–107)
CO2 SERPL-SCNC: 23 MMOL/L (ref 22–29)
CREAT SERPL-MCNC: 0.44 MG/DL (ref 0.76–1.27)
CRP SERPL-MCNC: 2.84 MG/DL (ref 0–0.5)
DEPRECATED RDW RBC AUTO: 48.6 FL (ref 37–54)
EOSINOPHIL # BLD AUTO: 0.37 10*3/MM3 (ref 0–0.4)
EOSINOPHIL NFR BLD AUTO: 4.8 % (ref 0.3–6.2)
ERYTHROCYTE [DISTWIDTH] IN BLOOD BY AUTOMATED COUNT: 14.4 % (ref 12.3–15.4)
ERYTHROCYTE [SEDIMENTATION RATE] IN BLOOD: 29 MM/HR (ref 0–20)
GFR SERPL CREATININE-BSD FRML MDRD: >150 ML/MIN/1.73
GLOBULIN UR ELPH-MCNC: 2.9 GM/DL
GLUCOSE SERPL-MCNC: 129 MG/DL (ref 65–99)
HCT VFR BLD AUTO: 38.2 % (ref 37.5–51)
HGB BLD-MCNC: 12.3 G/DL (ref 13–17.7)
IMM GRANULOCYTES # BLD AUTO: 0.02 10*3/MM3 (ref 0–0.05)
IMM GRANULOCYTES NFR BLD AUTO: 0.3 % (ref 0–0.5)
LYMPHOCYTES # BLD AUTO: 1.35 10*3/MM3 (ref 0.7–3.1)
LYMPHOCYTES NFR BLD AUTO: 17.5 % (ref 19.6–45.3)
MCH RBC QN AUTO: 29.6 PG (ref 26.6–33)
MCHC RBC AUTO-ENTMCNC: 32.2 G/DL (ref 31.5–35.7)
MCV RBC AUTO: 92 FL (ref 79–97)
MONOCYTES # BLD AUTO: 0.82 10*3/MM3 (ref 0.1–0.9)
MONOCYTES NFR BLD AUTO: 10.6 % (ref 5–12)
NEUTROPHILS NFR BLD AUTO: 5.06 10*3/MM3 (ref 1.7–7)
NEUTROPHILS NFR BLD AUTO: 65.6 % (ref 42.7–76)
NRBC BLD AUTO-RTO: 0 /100 WBC (ref 0–0.2)
PLATELET # BLD AUTO: 291 10*3/MM3 (ref 140–450)
PMV BLD AUTO: 10.1 FL (ref 6–12)
POTASSIUM SERPL-SCNC: 4.4 MMOL/L (ref 3.5–5.2)
PROT SERPL-MCNC: 6.6 G/DL (ref 6–8.5)
RBC # BLD AUTO: 4.15 10*6/MM3 (ref 4.14–5.8)
SODIUM SERPL-SCNC: 129 MMOL/L (ref 136–145)
WBC # BLD AUTO: 7.71 10*3/MM3 (ref 3.4–10.8)

## 2020-09-17 PROCEDURE — 85652 RBC SED RATE AUTOMATED: CPT

## 2020-09-17 PROCEDURE — 80053 COMPREHEN METABOLIC PANEL: CPT

## 2020-09-17 PROCEDURE — 86140 C-REACTIVE PROTEIN: CPT

## 2020-09-17 PROCEDURE — 36415 COLL VENOUS BLD VENIPUNCTURE: CPT

## 2020-09-17 PROCEDURE — 85025 COMPLETE CBC W/AUTO DIFF WBC: CPT

## 2020-09-22 ENCOUNTER — LAB (OUTPATIENT)
Dept: LAB | Facility: HOSPITAL | Age: 60
End: 2020-09-22

## 2020-09-22 DIAGNOSIS — A04.72 C. DIFFICILE DIARRHEA: Primary | ICD-10-CM

## 2020-09-22 DIAGNOSIS — R19.7 DIARRHEA, UNSPECIFIED TYPE: ICD-10-CM

## 2020-09-22 PROCEDURE — 87493 C DIFF AMPLIFIED PROBE: CPT

## 2020-09-24 LAB — C DIFF TOX GENS STL QL NAA+PROBE: NOT DETECTED

## 2020-10-06 ENCOUNTER — LAB (OUTPATIENT)
Dept: LAB | Facility: HOSPITAL | Age: 60
End: 2020-10-06

## 2020-10-06 ENCOUNTER — TRANSCRIBE ORDERS (OUTPATIENT)
Dept: LAB | Facility: HOSPITAL | Age: 60
End: 2020-10-06

## 2020-10-06 DIAGNOSIS — L89.152 DECUBITUS ULCER OF SACRAL REGION, STAGE 2 (HCC): ICD-10-CM

## 2020-10-06 DIAGNOSIS — G82.20 PARAPLEGIA (HCC): ICD-10-CM

## 2020-10-06 DIAGNOSIS — M46.28 OSTEOMYELITIS OF SACROILIAC REGION (HCC): ICD-10-CM

## 2020-10-06 DIAGNOSIS — A04.72 INTESTINAL INFECTION DUE TO CLOSTRIDIUM DIFFICILE: Primary | ICD-10-CM

## 2020-10-06 LAB
ALBUMIN SERPL-MCNC: 4.1 G/DL (ref 3.5–5.2)
ALBUMIN/GLOB SERPL: 1.4 G/DL
ALP SERPL-CCNC: 51 U/L (ref 39–117)
ALT SERPL W P-5'-P-CCNC: 9 U/L (ref 1–41)
ANION GAP SERPL CALCULATED.3IONS-SCNC: 12 MMOL/L (ref 5–15)
AST SERPL-CCNC: 19 U/L (ref 1–40)
BASOPHILS # BLD AUTO: 0.1 10*3/MM3 (ref 0–0.2)
BASOPHILS NFR BLD AUTO: 1 % (ref 0–1.5)
BILIRUB SERPL-MCNC: 0.4 MG/DL (ref 0–1.2)
BUN SERPL-MCNC: 14 MG/DL (ref 8–23)
BUN/CREAT SERPL: 28 (ref 7–25)
CALCIUM SPEC-SCNC: 9.3 MG/DL (ref 8.6–10.5)
CHLORIDE SERPL-SCNC: 94 MMOL/L (ref 98–107)
CO2 SERPL-SCNC: 24 MMOL/L (ref 22–29)
CREAT SERPL-MCNC: 0.5 MG/DL (ref 0.76–1.27)
CRP SERPL-MCNC: 1.83 MG/DL (ref 0–0.5)
DEPRECATED RDW RBC AUTO: 51.9 FL (ref 37–54)
EOSINOPHIL # BLD AUTO: 0.39 10*3/MM3 (ref 0–0.4)
EOSINOPHIL NFR BLD AUTO: 4 % (ref 0.3–6.2)
ERYTHROCYTE [DISTWIDTH] IN BLOOD BY AUTOMATED COUNT: 15.1 % (ref 12.3–15.4)
ERYTHROCYTE [SEDIMENTATION RATE] IN BLOOD: 28 MM/HR (ref 0–20)
GFR SERPL CREATININE-BSD FRML MDRD: >150 ML/MIN/1.73
GLOBULIN UR ELPH-MCNC: 2.9 GM/DL
GLUCOSE SERPL-MCNC: 108 MG/DL (ref 65–99)
HCT VFR BLD AUTO: 40.2 % (ref 37.5–51)
HGB BLD-MCNC: 12.7 G/DL (ref 13–17.7)
IMM GRANULOCYTES # BLD AUTO: 0.03 10*3/MM3 (ref 0–0.05)
IMM GRANULOCYTES NFR BLD AUTO: 0.3 % (ref 0–0.5)
LYMPHOCYTES # BLD AUTO: 1.6 10*3/MM3 (ref 0.7–3.1)
LYMPHOCYTES NFR BLD AUTO: 16.3 % (ref 19.6–45.3)
MCH RBC QN AUTO: 29.5 PG (ref 26.6–33)
MCHC RBC AUTO-ENTMCNC: 31.6 G/DL (ref 31.5–35.7)
MCV RBC AUTO: 93.5 FL (ref 79–97)
MONOCYTES # BLD AUTO: 0.92 10*3/MM3 (ref 0.1–0.9)
MONOCYTES NFR BLD AUTO: 9.4 % (ref 5–12)
NEUTROPHILS NFR BLD AUTO: 6.76 10*3/MM3 (ref 1.7–7)
NEUTROPHILS NFR BLD AUTO: 69 % (ref 42.7–76)
NRBC BLD AUTO-RTO: 0 /100 WBC (ref 0–0.2)
PLATELET # BLD AUTO: 265 10*3/MM3 (ref 140–450)
PMV BLD AUTO: 10.4 FL (ref 6–12)
POTASSIUM SERPL-SCNC: 4.3 MMOL/L (ref 3.5–5.2)
PROT SERPL-MCNC: 7 G/DL (ref 6–8.5)
RBC # BLD AUTO: 4.3 10*6/MM3 (ref 4.14–5.8)
SODIUM SERPL-SCNC: 130 MMOL/L (ref 136–145)
WBC # BLD AUTO: 9.8 10*3/MM3 (ref 3.4–10.8)

## 2020-10-06 PROCEDURE — 36415 COLL VENOUS BLD VENIPUNCTURE: CPT | Performed by: INTERNAL MEDICINE

## 2020-10-06 PROCEDURE — 85652 RBC SED RATE AUTOMATED: CPT | Performed by: INTERNAL MEDICINE

## 2020-10-06 PROCEDURE — 80053 COMPREHEN METABOLIC PANEL: CPT | Performed by: INTERNAL MEDICINE

## 2020-10-06 PROCEDURE — 85025 COMPLETE CBC W/AUTO DIFF WBC: CPT | Performed by: INTERNAL MEDICINE

## 2020-10-06 PROCEDURE — 86140 C-REACTIVE PROTEIN: CPT | Performed by: INTERNAL MEDICINE

## 2020-10-27 ENCOUNTER — LAB (OUTPATIENT)
Dept: LAB | Facility: HOSPITAL | Age: 60
End: 2020-10-27

## 2020-10-27 DIAGNOSIS — R19.7 DIARRHEA OF PRESUMED INFECTIOUS ORIGIN: Primary | ICD-10-CM

## 2020-10-27 PROCEDURE — 87493 C DIFF AMPLIFIED PROBE: CPT

## 2020-10-28 LAB — C DIFF TOX GENS STL QL NAA+PROBE: DETECTED

## 2020-12-11 ENCOUNTER — TELEMEDICINE (OUTPATIENT)
Dept: FAMILY MEDICINE CLINIC | Facility: CLINIC | Age: 60
End: 2020-12-11

## 2020-12-11 DIAGNOSIS — E55.9 VITAMIN D DEFICIENCY: Primary | ICD-10-CM

## 2020-12-11 DIAGNOSIS — G90.1 DYSAUTONOMIA (HCC): ICD-10-CM

## 2020-12-11 DIAGNOSIS — Z12.5 SCREENING PSA (PROSTATE SPECIFIC ANTIGEN): ICD-10-CM

## 2020-12-11 DIAGNOSIS — E53.8 COBALAMIN DEFICIENCY: ICD-10-CM

## 2020-12-11 DIAGNOSIS — Z13.29 SCREENING FOR ENDOCRINE DISORDER: ICD-10-CM

## 2020-12-11 DIAGNOSIS — H92.09 OTALGIA, UNSPECIFIED LATERALITY: ICD-10-CM

## 2020-12-11 DIAGNOSIS — A04.72 C. DIFFICILE DIARRHEA: ICD-10-CM

## 2020-12-11 DIAGNOSIS — Z13.220 SCREENING FOR HYPERLIPIDEMIA: ICD-10-CM

## 2020-12-11 DIAGNOSIS — Z13.6 ENCOUNTER FOR SCREENING FOR CARDIOVASCULAR DISORDERS: ICD-10-CM

## 2020-12-11 PROCEDURE — 99213 OFFICE O/P EST LOW 20 MIN: CPT | Performed by: FAMILY MEDICINE

## 2020-12-11 RX ORDER — CIPROFLOXACIN AND DEXAMETHASONE 3; 1 MG/ML; MG/ML
4 SUSPENSION/ DROPS AURICULAR (OTIC) 2 TIMES DAILY
Qty: 7.5 ML | Refills: 0 | Status: SHIPPED | OUTPATIENT
Start: 2020-12-11 | End: 2020-12-18

## 2020-12-11 NOTE — PROGRESS NOTES
Subjective   Baudilio Toledo is a 60 y.o. male.     Chief Complaint   Patient presents with   • Follow-up     C DIFF       History of Present Illness     Baudilio Toledo presents today for   Chief Complaint   Patient presents with   • Follow-up     C DIFF     Sees ID Dr. Szymanski on 15th. Had c diff with complicated course requiring multiple treatment regimens, on vanc and flagyl now. Pressure ulcer is healing well albeit slowly. He will ask Dr. Szymanski about COVID vaccine. Will have labs done then anyway and would like lab orders for yearly labs.    You have chosen to receive care through a telehealth visit.  Do you consent to use a video/audio connection for your medical care today? Yes    The following portions of the patient's history were reviewed and updated as appropriate: allergies, current medications, past family history, past medical history, past social history, past surgical history and problem list.    Active Ambulatory Problems     Diagnosis Date Noted   • Atopic rhinitis 05/31/2016   • Shortness of breath 05/31/2016   • Closed fracture of cervical vertebra (CMS/HCC) 05/31/2016   • Paraplegia (CMS/HCC) 05/31/2016   • Priapism 05/31/2016   • Hypertonicity of bladder 05/31/2016   • Retention of urine 05/31/2016   • Urinary tract infection 05/31/2016   • Cobalamin deficiency 05/31/2016   • Vitamin D deficiency 05/31/2016   • Preventative health care 11/23/2016   • Frailty 11/23/2016   • Hyponatremia 05/06/2017   • JUANA (acute kidney injury) (CMS/HCC) 05/06/2017   • Allergic conjunctivitis 12/18/2017   • Decubitus ulcer of sacral area 02/02/2019   • Cellulitis 02/02/2019   • Hyponatremia 02/02/2019   • Dysautonomia (CMS/[facility]) 02/02/2019   • Cellulitis and abscess of buttock 02/02/2019     Resolved Ambulatory Problems     Diagnosis Date Noted   • Cerumen impaction 05/31/2016   • Quadriplegia, post-traumatic (CMS/HCC) 11/23/2016   • Fever 05/04/2017   • Sepsis secondary to UTI (CMS/[facility]) 05/06/2017   • Sepsis due to  urinary tract infection (CMS/[facility]) 2017   • Elevated serum creatinine 05/15/2017     Past Medical History:   Diagnosis Date   • Allergic rhinitis    • Fracture, cervical vertebra (CMS/HCC)    • Impacted cerumen    • OAB (overactive bladder)    • Pyelonephritis 2017   • Recurrent UTI    • Scoliosis    • Shingles    • Urinary retention    • Vitamin B12 deficiency      Past Surgical History:   Procedure Laterality Date   • CERVICAL SPINE SURGERY      repair   • HERNIA REPAIR  1966    inguinal bilateral   • TONSILLECTOMY  1968     Family History   Problem Relation Age of Onset   • Ovarian cancer Mother          78   • Heart failure Father          age 69   • Brain cancer Father    • Psoriasis Sister    • Colon polyps Sister    • Heart disease Maternal Grandfather    • Hypertension Maternal Grandfather    • Thyroid disease Sister      Social History     Socioeconomic History   • Marital status: Single     Spouse name: Not on file   • Number of children: Not on file   • Years of education: Not on file   • Highest education level: Not on file   Tobacco Use   • Smoking status: Never Smoker   • Smokeless tobacco: Never Used   Substance and Sexual Activity   • Alcohol use: Yes     Comment: 6 glasses of vodka weekly   • Drug use: No   Social History Narrative    Domestic life : Lives in private home modified for handicapped access        [employer]        Sleep hygiene :   In bed 10 PM to 5:30 AM for 7 hours  nightly         Caffeine use : No caffeine intake        Exercise habits : No routine exercise but physical work daily to maintain adjustment to handicap        Diet : Skips breakfast - has well-balanced lunch and dinner        Occupation : Works 30-45 hours weekly in [employer]        Hearing : No impairment        Vision : No impairment        Driving : No limitations           Review of Systems  Review of Systems -  General ROS: negative for - chills, fever or night  sweats  Cardiovascular ROS: no chest pain or dyspnea on exertion  Gastrointestinal ROS: no abdominal pain, change in bowel habits, or black or bloody stools  Genito-Urinary ROS: no dysuria, trouble voiding, or hematuria    Objective   There were no vitals taken for this visit.  Vitals obtained from patient if available  Physical Exam  Const: Non-toxic appearing, NAD, A&Ox4, Pleasant, Cooperative  Eyes: EOMI, no conjunctivitis  ENT: No copious nasal drainage noted  Cardiac: Regular rate by pulse  Resp: Respiratory rate observed to be within normal limits, no increased work of breathing observed, no audible wheezing or cough noted  Psych: Appropriate mood and behavior.  Procedures  Assessment/Plan   Problem List Items Addressed This Visit        Digestive    Cobalamin deficiency    Relevant Orders    Vitamin B12    Vitamin D deficiency - Primary    Relevant Orders    Vitamin D 25 Hydroxy       Nervous and Auditory    Dysautonomia (CMS/HCC)    Relevant Orders    CBC & Differential    Comprehensive Metabolic Panel    C-reactive Protein    Sedimentation rate, automated    Microalbumin / Creatinine Urine Ratio - Urine, Clean Catch    Lipid Panel    Urinalysis With Microscopic If Indicated (No Culture) - Urine, Clean Catch    Vitamin D 25 Hydroxy    Vitamin B12      Other Visit Diagnoses     Otalgia, unspecified laterality        Relevant Medications    ciprofloxacin-dexamethasone (CIPRODEX) 0.3-0.1 % otic suspension    Screening PSA (prostate specific antigen)        Relevant Orders    PSA Screen    Screening for endocrine disorder        Relevant Orders    Microalbumin / Creatinine Urine Ratio - Urine, Clean Catch    Urinalysis With Microscopic If Indicated (No Culture) - Urine, Clean Catch    Screening for hyperlipidemia        Relevant Orders    Lipid Panel    C. difficile diarrhea        Relevant Orders    CBC & Differential    Comprehensive Metabolic Panel    C-reactive Protein    Sedimentation rate, automated     Encounter for screening for cardiovascular disorders         Relevant Orders    Lipid Panel          See patient diagnoses and orders along with patient instructions for assessment, plan, and changes to care for patient.    This visit was conducted via telemedicine with live video and audio provided through Video Options: MyChart/Zoom at the point of care.    There are no Patient Instructions on file for this visit.    Return in about 6 months (around 6/11/2021) for Medicare Wellness.    Ambulatory progress note signed and attested to by Kevin Garcia D.O.

## 2020-12-14 ENCOUNTER — LAB (OUTPATIENT)
Dept: LAB | Facility: HOSPITAL | Age: 60
End: 2020-12-14

## 2020-12-14 DIAGNOSIS — Z13.6 ENCOUNTER FOR SCREENING FOR CARDIOVASCULAR DISORDERS: ICD-10-CM

## 2020-12-14 DIAGNOSIS — Z13.220 SCREENING FOR HYPERLIPIDEMIA: ICD-10-CM

## 2020-12-14 DIAGNOSIS — Z13.29 SCREENING FOR ENDOCRINE DISORDER: ICD-10-CM

## 2020-12-14 DIAGNOSIS — E55.9 VITAMIN D DEFICIENCY: ICD-10-CM

## 2020-12-14 DIAGNOSIS — A04.72 C. DIFFICILE DIARRHEA: ICD-10-CM

## 2020-12-14 DIAGNOSIS — E53.8 COBALAMIN DEFICIENCY: ICD-10-CM

## 2020-12-14 DIAGNOSIS — Z12.5 SCREENING PSA (PROSTATE SPECIFIC ANTIGEN): ICD-10-CM

## 2020-12-14 DIAGNOSIS — G90.1 DYSAUTONOMIA (HCC): ICD-10-CM

## 2020-12-14 LAB
25(OH)D3 SERPL-MCNC: 27.2 NG/ML (ref 30–100)
ALBUMIN SERPL-MCNC: 3.8 G/DL (ref 3.5–5.2)
ALBUMIN/GLOB SERPL: 1.2 G/DL
ALP SERPL-CCNC: 47 U/L (ref 39–117)
ALT SERPL W P-5'-P-CCNC: 11 U/L (ref 1–41)
ANION GAP SERPL CALCULATED.3IONS-SCNC: 10.9 MMOL/L (ref 5–15)
AST SERPL-CCNC: 19 U/L (ref 1–40)
BASOPHILS # BLD AUTO: 0.1 10*3/MM3 (ref 0–0.2)
BASOPHILS NFR BLD AUTO: 1.5 % (ref 0–1.5)
BILIRUB SERPL-MCNC: 0.4 MG/DL (ref 0–1.2)
BUN SERPL-MCNC: 5 MG/DL (ref 8–23)
BUN/CREAT SERPL: 14.3 (ref 7–25)
CALCIUM SPEC-SCNC: 9.2 MG/DL (ref 8.6–10.5)
CHLORIDE SERPL-SCNC: 99 MMOL/L (ref 98–107)
CHOLEST SERPL-MCNC: 81 MG/DL (ref 0–200)
CO2 SERPL-SCNC: 23.1 MMOL/L (ref 22–29)
CREAT SERPL-MCNC: 0.35 MG/DL (ref 0.76–1.27)
CRP SERPL-MCNC: 1.67 MG/DL (ref 0–0.5)
DEPRECATED RDW RBC AUTO: 41.5 FL (ref 37–54)
EOSINOPHIL # BLD AUTO: 0.4 10*3/MM3 (ref 0–0.4)
EOSINOPHIL NFR BLD AUTO: 5.9 % (ref 0.3–6.2)
ERYTHROCYTE [DISTWIDTH] IN BLOOD BY AUTOMATED COUNT: 12.7 % (ref 12.3–15.4)
ERYTHROCYTE [SEDIMENTATION RATE] IN BLOOD: 22 MM/HR (ref 0–20)
GFR SERPL CREATININE-BSD FRML MDRD: >150 ML/MIN/1.73
GLOBULIN UR ELPH-MCNC: 3.3 GM/DL
GLUCOSE SERPL-MCNC: 95 MG/DL (ref 65–99)
HCT VFR BLD AUTO: 40.8 % (ref 37.5–51)
HDLC SERPL-MCNC: 54 MG/DL (ref 40–60)
HGB BLD-MCNC: 13.6 G/DL (ref 13–17.7)
IMM GRANULOCYTES # BLD AUTO: 0.03 10*3/MM3 (ref 0–0.05)
IMM GRANULOCYTES NFR BLD AUTO: 0.4 % (ref 0–0.5)
LDLC SERPL CALC-MCNC: 14 MG/DL (ref 0–100)
LDLC/HDLC SERPL: 0.31 {RATIO}
LYMPHOCYTES # BLD AUTO: 1.4 10*3/MM3 (ref 0.7–3.1)
LYMPHOCYTES NFR BLD AUTO: 20.8 % (ref 19.6–45.3)
MCH RBC QN AUTO: 30.1 PG (ref 26.6–33)
MCHC RBC AUTO-ENTMCNC: 33.3 G/DL (ref 31.5–35.7)
MCV RBC AUTO: 90.3 FL (ref 79–97)
MONOCYTES # BLD AUTO: 0.71 10*3/MM3 (ref 0.1–0.9)
MONOCYTES NFR BLD AUTO: 10.5 % (ref 5–12)
NEUTROPHILS NFR BLD AUTO: 4.09 10*3/MM3 (ref 1.7–7)
NEUTROPHILS NFR BLD AUTO: 60.9 % (ref 42.7–76)
NRBC BLD AUTO-RTO: 0 /100 WBC (ref 0–0.2)
PLATELET # BLD AUTO: 341 10*3/MM3 (ref 140–450)
PMV BLD AUTO: 9.8 FL (ref 6–12)
POTASSIUM SERPL-SCNC: 4.3 MMOL/L (ref 3.5–5.2)
PROT SERPL-MCNC: 7.1 G/DL (ref 6–8.5)
PSA SERPL-MCNC: 1.9 NG/ML (ref 0–4)
RBC # BLD AUTO: 4.52 10*6/MM3 (ref 4.14–5.8)
SODIUM SERPL-SCNC: 133 MMOL/L (ref 136–145)
TRIGL SERPL-MCNC: 52 MG/DL (ref 0–150)
VIT B12 BLD-MCNC: 482 PG/ML (ref 211–946)
VLDLC SERPL-MCNC: 13 MG/DL (ref 5–40)
WBC # BLD AUTO: 6.73 10*3/MM3 (ref 3.4–10.8)

## 2020-12-14 PROCEDURE — 36415 COLL VENOUS BLD VENIPUNCTURE: CPT

## 2020-12-14 PROCEDURE — G0103 PSA SCREENING: HCPCS

## 2020-12-14 PROCEDURE — 80053 COMPREHEN METABOLIC PANEL: CPT

## 2020-12-14 PROCEDURE — 86140 C-REACTIVE PROTEIN: CPT

## 2020-12-14 PROCEDURE — 82306 VITAMIN D 25 HYDROXY: CPT

## 2020-12-14 PROCEDURE — 80061 LIPID PANEL: CPT

## 2020-12-14 PROCEDURE — 85652 RBC SED RATE AUTOMATED: CPT

## 2020-12-14 PROCEDURE — 85025 COMPLETE CBC W/AUTO DIFF WBC: CPT

## 2020-12-14 PROCEDURE — 82607 VITAMIN B-12: CPT

## 2021-02-09 ENCOUNTER — LAB (OUTPATIENT)
Dept: LAB | Facility: HOSPITAL | Age: 61
End: 2021-02-09

## 2021-02-09 ENCOUNTER — TRANSCRIBE ORDERS (OUTPATIENT)
Dept: LAB | Facility: HOSPITAL | Age: 61
End: 2021-02-09

## 2021-02-09 DIAGNOSIS — B95.4 BACTERIAL INFECTION DUE TO STREPTOCOCCUS, GROUP G: ICD-10-CM

## 2021-02-09 DIAGNOSIS — Z13.29 SCREENING FOR ENDOCRINE DISORDER: ICD-10-CM

## 2021-02-09 DIAGNOSIS — G90.1 DYSAUTONOMIA (HCC): ICD-10-CM

## 2021-02-09 DIAGNOSIS — G82.20 PARAPLEGIA (HCC): ICD-10-CM

## 2021-02-09 DIAGNOSIS — A04.72 INTESTINAL INFECTION DUE TO CLOSTRIDIUM DIFFICILE: Primary | ICD-10-CM

## 2021-02-09 LAB
ALBUMIN UR-MCNC: <1.2 MG/DL
BACTERIA UR QL AUTO: ABNORMAL /HPF
BILIRUB UR QL STRIP: NEGATIVE
CLARITY UR: CLEAR
COLOR UR: YELLOW
CREAT UR-MCNC: 31.8 MG/DL
GLUCOSE UR STRIP-MCNC: NEGATIVE MG/DL
HGB UR QL STRIP.AUTO: NEGATIVE
HYALINE CASTS UR QL AUTO: ABNORMAL /LPF
KETONES UR QL STRIP: NEGATIVE
LEUKOCYTE ESTERASE UR QL STRIP.AUTO: ABNORMAL
MICROALBUMIN/CREAT UR: NORMAL MG/G{CREAT}
NITRITE UR QL STRIP: NEGATIVE
PH UR STRIP.AUTO: 6.5 [PH] (ref 5–8)
PROT UR QL STRIP: NEGATIVE
RBC # UR: ABNORMAL /HPF
REF LAB TEST METHOD: ABNORMAL
SP GR UR STRIP: 1.01 (ref 1–1.03)
SQUAMOUS #/AREA URNS HPF: ABNORMAL /HPF
UROBILINOGEN UR QL STRIP: ABNORMAL
WBC UR QL AUTO: ABNORMAL /HPF

## 2021-02-09 PROCEDURE — 81001 URINALYSIS AUTO W/SCOPE: CPT | Performed by: INTERNAL MEDICINE

## 2021-02-09 PROCEDURE — 87086 URINE CULTURE/COLONY COUNT: CPT | Performed by: INTERNAL MEDICINE

## 2021-02-09 PROCEDURE — 87186 SC STD MICRODIL/AGAR DIL: CPT | Performed by: INTERNAL MEDICINE

## 2021-02-09 PROCEDURE — 82043 UR ALBUMIN QUANTITATIVE: CPT

## 2021-02-09 PROCEDURE — 82570 ASSAY OF URINE CREATININE: CPT

## 2021-02-09 PROCEDURE — 87077 CULTURE AEROBIC IDENTIFY: CPT | Performed by: INTERNAL MEDICINE

## 2021-02-11 LAB — BACTERIA SPEC AEROBE CULT: ABNORMAL

## 2021-04-05 ENCOUNTER — TRANSCRIBE ORDERS (OUTPATIENT)
Dept: LAB | Facility: HOSPITAL | Age: 61
End: 2021-04-05

## 2021-04-05 ENCOUNTER — LAB (OUTPATIENT)
Dept: LAB | Facility: HOSPITAL | Age: 61
End: 2021-04-05

## 2021-04-05 DIAGNOSIS — L02.31 CELLULITIS AND ABSCESS OF BUTTOCK: ICD-10-CM

## 2021-04-05 DIAGNOSIS — R19.7 DIARRHEA OF PRESUMED INFECTIOUS ORIGIN: ICD-10-CM

## 2021-04-05 DIAGNOSIS — L03.317 CELLULITIS AND ABSCESS OF BUTTOCK: ICD-10-CM

## 2021-04-05 DIAGNOSIS — A04.72 INTESTINAL INFECTION DUE TO CLOSTRIDIUM DIFFICILE: Primary | ICD-10-CM

## 2021-04-05 LAB
ALBUMIN SERPL-MCNC: 3.8 G/DL (ref 3.5–5.2)
ALBUMIN/GLOB SERPL: 1.3 G/DL
ALP SERPL-CCNC: 64 U/L (ref 39–117)
ALT SERPL W P-5'-P-CCNC: 8 U/L (ref 1–41)
ANION GAP SERPL CALCULATED.3IONS-SCNC: 10 MMOL/L (ref 5–15)
AST SERPL-CCNC: 14 U/L (ref 1–40)
BASOPHILS # BLD AUTO: 0.13 10*3/MM3 (ref 0–0.2)
BASOPHILS NFR BLD AUTO: 1.5 % (ref 0–1.5)
BILIRUB SERPL-MCNC: 0.2 MG/DL (ref 0–1.2)
BUN SERPL-MCNC: 8 MG/DL (ref 8–23)
BUN/CREAT SERPL: 20.5 (ref 7–25)
CALCIUM SPEC-SCNC: 8.4 MG/DL (ref 8.6–10.5)
CHLORIDE SERPL-SCNC: 96 MMOL/L (ref 98–107)
CO2 SERPL-SCNC: 24 MMOL/L (ref 22–29)
CREAT SERPL-MCNC: 0.39 MG/DL (ref 0.76–1.27)
CRP SERPL-MCNC: 1.72 MG/DL (ref 0–0.5)
DEPRECATED RDW RBC AUTO: 46.4 FL (ref 37–54)
EOSINOPHIL # BLD AUTO: 0.58 10*3/MM3 (ref 0–0.4)
EOSINOPHIL NFR BLD AUTO: 6.5 % (ref 0.3–6.2)
ERYTHROCYTE [DISTWIDTH] IN BLOOD BY AUTOMATED COUNT: 14.4 % (ref 12.3–15.4)
ERYTHROCYTE [SEDIMENTATION RATE] IN BLOOD: 25 MM/HR (ref 0–20)
GFR SERPL CREATININE-BSD FRML MDRD: >150 ML/MIN/1.73
GLOBULIN UR ELPH-MCNC: 2.9 GM/DL
GLUCOSE SERPL-MCNC: 112 MG/DL (ref 65–99)
HCT VFR BLD AUTO: 37.3 % (ref 37.5–51)
HGB BLD-MCNC: 12.2 G/DL (ref 13–17.7)
IMM GRANULOCYTES # BLD AUTO: 0.02 10*3/MM3 (ref 0–0.05)
IMM GRANULOCYTES NFR BLD AUTO: 0.2 % (ref 0–0.5)
LYMPHOCYTES # BLD AUTO: 1.9 10*3/MM3 (ref 0.7–3.1)
LYMPHOCYTES NFR BLD AUTO: 21.4 % (ref 19.6–45.3)
MCH RBC QN AUTO: 29 PG (ref 26.6–33)
MCHC RBC AUTO-ENTMCNC: 32.7 G/DL (ref 31.5–35.7)
MCV RBC AUTO: 88.8 FL (ref 79–97)
MONOCYTES # BLD AUTO: 0.94 10*3/MM3 (ref 0.1–0.9)
MONOCYTES NFR BLD AUTO: 10.6 % (ref 5–12)
NEUTROPHILS NFR BLD AUTO: 5.29 10*3/MM3 (ref 1.7–7)
NEUTROPHILS NFR BLD AUTO: 59.8 % (ref 42.7–76)
NRBC BLD AUTO-RTO: 0 /100 WBC (ref 0–0.2)
PLATELET # BLD AUTO: 278 10*3/MM3 (ref 140–450)
PMV BLD AUTO: 8.7 FL (ref 6–12)
POTASSIUM SERPL-SCNC: 4.1 MMOL/L (ref 3.5–5.2)
PROT SERPL-MCNC: 6.7 G/DL (ref 6–8.5)
RBC # BLD AUTO: 4.2 10*6/MM3 (ref 4.14–5.8)
SODIUM SERPL-SCNC: 130 MMOL/L (ref 136–145)
WBC # BLD AUTO: 8.86 10*3/MM3 (ref 3.4–10.8)

## 2021-04-05 PROCEDURE — 85652 RBC SED RATE AUTOMATED: CPT | Performed by: INTERNAL MEDICINE

## 2021-04-05 PROCEDURE — 36415 COLL VENOUS BLD VENIPUNCTURE: CPT | Performed by: INTERNAL MEDICINE

## 2021-04-05 PROCEDURE — 80053 COMPREHEN METABOLIC PANEL: CPT | Performed by: INTERNAL MEDICINE

## 2021-04-05 PROCEDURE — 85025 COMPLETE CBC W/AUTO DIFF WBC: CPT | Performed by: INTERNAL MEDICINE

## 2021-04-05 PROCEDURE — 86140 C-REACTIVE PROTEIN: CPT | Performed by: INTERNAL MEDICINE

## 2021-04-28 ENCOUNTER — TRANSCRIBE ORDERS (OUTPATIENT)
Dept: LAB | Facility: HOSPITAL | Age: 61
End: 2021-04-28

## 2021-04-28 ENCOUNTER — LAB (OUTPATIENT)
Dept: LAB | Facility: HOSPITAL | Age: 61
End: 2021-04-28

## 2021-04-28 DIAGNOSIS — R35.0 URINARY FREQUENCY: ICD-10-CM

## 2021-04-28 DIAGNOSIS — R35.0 URINARY FREQUENCY: Primary | ICD-10-CM

## 2021-04-28 LAB
BACTERIA UR QL AUTO: ABNORMAL /HPF
BILIRUB UR QL STRIP: NEGATIVE
CLARITY UR: CLEAR
COLOR UR: YELLOW
GLUCOSE UR STRIP-MCNC: NEGATIVE MG/DL
HGB UR QL STRIP.AUTO: NEGATIVE
HYALINE CASTS UR QL AUTO: ABNORMAL /LPF
KETONES UR QL STRIP: NEGATIVE
LEUKOCYTE ESTERASE UR QL STRIP.AUTO: ABNORMAL
NITRITE UR QL STRIP: NEGATIVE
PH UR STRIP.AUTO: 7 [PH] (ref 5–8)
PROT UR QL STRIP: NEGATIVE
RBC # UR: ABNORMAL /HPF
REF LAB TEST METHOD: ABNORMAL
SP GR UR STRIP: 1.01 (ref 1–1.03)
SQUAMOUS #/AREA URNS HPF: ABNORMAL /HPF
UROBILINOGEN UR QL STRIP: ABNORMAL
WBC UR QL AUTO: ABNORMAL /HPF

## 2021-04-28 PROCEDURE — 87186 SC STD MICRODIL/AGAR DIL: CPT

## 2021-04-28 PROCEDURE — 81001 URINALYSIS AUTO W/SCOPE: CPT

## 2021-04-28 PROCEDURE — 87086 URINE CULTURE/COLONY COUNT: CPT

## 2021-04-28 PROCEDURE — 87077 CULTURE AEROBIC IDENTIFY: CPT

## 2021-04-30 LAB — BACTERIA SPEC AEROBE CULT: ABNORMAL

## 2021-06-14 ENCOUNTER — TELEPHONE (OUTPATIENT)
Dept: FAMILY MEDICINE CLINIC | Facility: CLINIC | Age: 61
End: 2021-06-14

## 2021-08-09 ENCOUNTER — TELEPHONE (OUTPATIENT)
Dept: FAMILY MEDICINE CLINIC | Facility: CLINIC | Age: 61
End: 2021-08-09

## 2021-08-09 NOTE — TELEPHONE ENCOUNTER
Caller: Baudilio Toledo     Relationship: Self     Best call back number: [phone]     What orders are you requesting (i.e. lab or imaging): patient called to call stating that they need electric wheel chair repair, so it can be billed to medicare.  It needs to go to [employer] fax number [phone]      In what timeframe would the patient need to come in: asap     Where will you receive your lab/imaging services:      Additional notes:  THIS MESSAGE WAS LOST IN THE SHUFFLE WHEN ORIGINALLY CALLED IN 06/14/21.

## 2021-08-09 NOTE — TELEPHONE ENCOUNTER
Called and spoke with patient to let him know Medicare/Medicade will require a face to face visit for this patient says he will call back after checking his schedule

## 2021-09-02 ENCOUNTER — TRANSCRIBE ORDERS (OUTPATIENT)
Dept: ADMINISTRATIVE | Facility: HOSPITAL | Age: 61
End: 2021-09-02

## 2021-09-02 DIAGNOSIS — M46.28 ABSCESS OF SACRUM (HCC): Primary | ICD-10-CM

## 2021-09-04 ENCOUNTER — HOSPITAL ENCOUNTER (OUTPATIENT)
Dept: MRI IMAGING | Facility: HOSPITAL | Age: 61
Discharge: HOME | End: 2021-09-04
Admitting: INTERNAL MEDICINE

## 2021-09-04 ENCOUNTER — APPOINTMENT (OUTPATIENT)
Dept: MRI IMAGING | Facility: HOSPITAL | Age: 61
End: 2021-09-04

## 2021-09-04 DIAGNOSIS — M46.28 ABSCESS OF SACRUM (HCC): ICD-10-CM

## 2021-09-04 PROCEDURE — 72195 MRI PELVIS W/O DYE: CPT

## 2021-09-08 ENCOUNTER — TELEPHONE (OUTPATIENT)
Dept: FAMILY MEDICINE CLINIC | Facility: CLINIC | Age: 61
End: 2021-09-08

## 2021-09-08 ENCOUNTER — LAB (OUTPATIENT)
Dept: LAB | Facility: HOSPITAL | Age: 61
End: 2021-09-08

## 2021-09-08 ENCOUNTER — DOCUMENTATION (OUTPATIENT)
Dept: FAMILY MEDICINE CLINIC | Facility: CLINIC | Age: 61
End: 2021-09-08

## 2021-09-08 DIAGNOSIS — R50.9 FEVER, UNSPECIFIED FEVER CAUSE: ICD-10-CM

## 2021-09-08 DIAGNOSIS — L08.9 WOUND INFECTION: Primary | ICD-10-CM

## 2021-09-08 DIAGNOSIS — L08.9 WOUND INFECTION: ICD-10-CM

## 2021-09-08 DIAGNOSIS — T14.8XXA WOUND INFECTION: Primary | ICD-10-CM

## 2021-09-08 DIAGNOSIS — T14.8XXA WOUND INFECTION: ICD-10-CM

## 2021-09-08 PROCEDURE — 87086 URINE CULTURE/COLONY COUNT: CPT

## 2021-09-08 PROCEDURE — 87077 CULTURE AEROBIC IDENTIFY: CPT

## 2021-09-08 PROCEDURE — 87186 SC STD MICRODIL/AGAR DIL: CPT

## 2021-09-08 NOTE — TELEPHONE ENCOUNTER
Caller: Baudilio Toledo    Relationship: Self    Best call back number: [phone]    What orders are you requesting (i.e. lab or imaging): URINE CULTURE AND WOUND CULTURE    In what timeframe would the patient need to come in: ASAP    Where will you receive your lab/imaging services: IN OFFICE    Additional notes: PATIENT STATED THAT HE HAS BEEN TRYING TO CLOSE A WOUND FOR A YEAR BUT THINKS THAT IT IS INFECTED.  PATIENT THINKS THAT HE HAS A UTI AS WELL.  PATIENT WOULD LIKE TO GET THE SUPPLIES FOR THE CULTURES PICKED UP TODAY.    PATIENT WENT DR. VILLALBA FOR MRI AND IT HAS NOT BEEN REVIEWED.  PATIENT NEEDS THIS TO BE SUBMITTED TO [facility] TO BE TREATED.

## 2021-09-09 ENCOUNTER — OFFICE VISIT (OUTPATIENT)
Dept: FAMILY MEDICINE CLINIC | Facility: CLINIC | Age: 61
End: 2021-09-09

## 2021-09-09 ENCOUNTER — LAB (OUTPATIENT)
Dept: LAB | Facility: HOSPITAL | Age: 61
End: 2021-09-09

## 2021-09-09 VITALS
BODY MASS INDEX: 24.42 KG/M2 | TEMPERATURE: 97.5 F | DIASTOLIC BLOOD PRESSURE: 62 MMHG | OXYGEN SATURATION: 98 % | HEART RATE: 82 BPM | SYSTOLIC BLOOD PRESSURE: 124 MMHG | HEIGHT: 71 IN | RESPIRATION RATE: 16 BRPM

## 2021-09-09 DIAGNOSIS — R50.9 FEVER, UNSPECIFIED FEVER CAUSE: ICD-10-CM

## 2021-09-09 DIAGNOSIS — L08.9 WOUND INFECTION: ICD-10-CM

## 2021-09-09 DIAGNOSIS — T14.8XXA WOUND INFECTION: ICD-10-CM

## 2021-09-09 DIAGNOSIS — L08.9 WOUND INFECTION: Primary | ICD-10-CM

## 2021-09-09 DIAGNOSIS — T14.8XXA WOUND INFECTION: Primary | ICD-10-CM

## 2021-09-09 LAB
BASOPHILS # BLD AUTO: 0.08 10*3/MM3 (ref 0–0.2)
BASOPHILS NFR BLD AUTO: 0.8 % (ref 0–1.5)
DEPRECATED RDW RBC AUTO: 43.3 FL (ref 37–54)
EOSINOPHIL # BLD AUTO: 0.17 10*3/MM3 (ref 0–0.4)
EOSINOPHIL NFR BLD AUTO: 1.8 % (ref 0.3–6.2)
ERYTHROCYTE [DISTWIDTH] IN BLOOD BY AUTOMATED COUNT: 13.1 % (ref 12.3–15.4)
HCT VFR BLD AUTO: 35.6 % (ref 37.5–51)
HGB BLD-MCNC: 12.1 G/DL (ref 13–17.7)
IMM GRANULOCYTES # BLD AUTO: 0.03 10*3/MM3 (ref 0–0.05)
IMM GRANULOCYTES NFR BLD AUTO: 0.3 % (ref 0–0.5)
LYMPHOCYTES # BLD AUTO: 1.47 10*3/MM3 (ref 0.7–3.1)
LYMPHOCYTES NFR BLD AUTO: 15.5 % (ref 19.6–45.3)
MCH RBC QN AUTO: 30.6 PG (ref 26.6–33)
MCHC RBC AUTO-ENTMCNC: 34 G/DL (ref 31.5–35.7)
MCV RBC AUTO: 90.1 FL (ref 79–97)
MONOCYTES # BLD AUTO: 1.38 10*3/MM3 (ref 0.1–0.9)
MONOCYTES NFR BLD AUTO: 14.5 % (ref 5–12)
NEUTROPHILS NFR BLD AUTO: 6.36 10*3/MM3 (ref 1.7–7)
NEUTROPHILS NFR BLD AUTO: 67.1 % (ref 42.7–76)
NRBC BLD AUTO-RTO: 0 /100 WBC (ref 0–0.2)
PLATELET # BLD AUTO: 244 10*3/MM3 (ref 140–450)
PMV BLD AUTO: 10 FL (ref 6–12)
RBC # BLD AUTO: 3.95 10*6/MM3 (ref 4.14–5.8)
WBC # BLD AUTO: 9.49 10*3/MM3 (ref 3.4–10.8)

## 2021-09-09 PROCEDURE — 86140 C-REACTIVE PROTEIN: CPT

## 2021-09-09 PROCEDURE — 85025 COMPLETE CBC W/AUTO DIFF WBC: CPT

## 2021-09-09 PROCEDURE — 99214 OFFICE O/P EST MOD 30 MIN: CPT | Performed by: FAMILY MEDICINE

## 2021-09-09 PROCEDURE — 87040 BLOOD CULTURE FOR BACTERIA: CPT

## 2021-09-09 PROCEDURE — 85652 RBC SED RATE AUTOMATED: CPT

## 2021-09-09 NOTE — PROGRESS NOTES
Subjective   Baudilio Toledo is a 60 y.o. male.     Chief Complaint   Patient presents with   • Lab culture results       History of Present Illness     Baudilio Toledo presents today for   Chief Complaint   Patient presents with   • Lab culture results     Baudilio presents today with concern for persistent ischial wound and infection. as well as a possible concern for a urinary tract infection. 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 He follows regularly with Dr. Andry Szymanski in infectious disease. Unfortunately, Dr. Szymanski is out of the office this week. The patient had an MRI of the pelvis on 09/04/2021.    He reports that he has an appointment with [facility] today at 1 PM. The patient states that Dr. Szymanski usually orders lab work when he is developing an infection, including sed rate, CRP, WBC, etc. They are requesting that these labs be ordered today. He does have an appointment scheduled with Dr. Szymanski on 09/13/2021. According to the patient, he was advised by Dr. Szymanski's office that he could start a course of either Keflex or doxycycline. They reportedly told the patient that the prescription for Keflex would be sent to his pharmacy, but it has not yet been sent as of last night. He did have doxycycline at home and started taking this. He does express some concern with taking oral antibiotics, as he has a history of C.diff. Additionally, he reports that he has been experiencing fevers, usually in the afternoon, since 09/07/2021. He has not had a fever today. The patient reports that the drainage from the wound is foul-smelling at this time, and the wound itself has completely opened back up. .     This patient is accompanied by a female who contributes to the history of their care.    The following portions of the patient's history were reviewed and updated as appropriate: allergies, current medications, past family history, past medical history,  past social history, past surgical history and problem list.    Active Ambulatory Problems     Diagnosis Date Noted   • Atopic rhinitis 2016   • Shortness of breath 2016   • Closed fracture of cervical vertebra (CMS/HCC) 2016   • Paraplegia (CMS/HCC) 2016   • Priapism 2016   • Hypertonicity of bladder 2016   • Retention of urine 2016   • Urinary tract infection 2016   • Cobalamin deficiency 2016   • Vitamin D deficiency 2016   • Preventative health care 2016   • Frailty 2016   • Hyponatremia 2017   • JUANA (acute kidney injury) (CMS/HCC) 2017   • Allergic conjunctivitis 2017   • Decubitus ulcer of sacral area 2019   • Cellulitis 2019   • Hyponatremia 2019   • Dysautonomia (CMS/HCC) 2019   • Cellulitis and abscess of buttock 2019     Resolved Ambulatory Problems     Diagnosis Date Noted   • Cerumen impaction 2016   • Quadriplegia, post-traumatic (CMS/HCC) 2016   • Fever 2017   • Sepsis secondary to UTI (CMS/HCC) 2017   • Sepsis due to urinary tract infection (CMS/HCC) 2017   • Elevated serum creatinine 05/15/2017     Past Medical History:   Diagnosis Date   • Allergic rhinitis    • Fracture, cervical vertebra (CMS/HCC)    • Impacted cerumen    • OAB (overactive bladder)    • Pyelonephritis 2017   • Recurrent UTI    • Scoliosis    • Shingles    • Urinary retention    • Vitamin B12 deficiency      Past Surgical History:   Procedure Laterality Date   • CERVICAL SPINE SURGERY      repair   • HERNIA REPAIR  1966    inguinal bilateral   • TONSILLECTOMY       Family History   Problem Relation Age of Onset   • Ovarian cancer Mother          78   • Heart failure Father          age 69   • Brain cancer Father    • Psoriasis Sister    • Colon polyps Sister    • Heart disease Maternal Grandfather    • Hypertension Maternal Grandfather    • Thyroid  "disease Sister      Social History     Socioeconomic History   • Marital status: Single     Spouse name: Not on file   • Number of children: Not on file   • Years of education: Not on file   • Highest education level: Not on file   Tobacco Use   • Smoking status: Never Smoker   • Smokeless tobacco: Never Used   Vaping Use   • Vaping Use: Never used   Substance and Sexual Activity   • Alcohol use: Yes     Comment: 6 glasses of vodka weekly   • Drug use: No   • Sexual activity: Defer       Review of Systems  See HPI    Objective   Blood pressure 124/62, pulse 82, temperature 97.5 °F (36.4 °C), resp. rate 16, height 180.3 cm (70.98\"), SpO2 98 %.  Nursing note reviewed  Physical Exam  Const: NAD, A&Ox4, Pleasant, Cooperative  Eyes: EOMI, no conjunctivitis  ENT: No nasal discharge present, neck supple  Cardiac: Regular rate and rhythm, no cyanosis  Resp: Respiratory rate within normal limits, no increased work of breathing, no audible wheezing or retractions noted  GI: No distention or ascites  MSK: Motor and sensation grossly intact in bilateral upper extremities  Neurologic: CN II-XII grossly intact  Psych: Appropriate mood and behavior.  Skin: Warm, dry  Procedures  Assessment/Plan   Problem List Items Addressed This Visit     None      Visit Diagnoses     Wound infection    -  Primary    Relevant Orders    Sedimentation rate, automated    C-reactive Protein    CBC & Differential    Blood Culture - Blood,    Blood Culture - Blood,    Fever, unspecified fever cause        Relevant Orders    Sedimentation rate, automated    C-reactive Protein    CBC & Differential    Blood Culture - Blood,    Blood Culture - Blood,          1. Non-healing ischial wound.  - He has an appointment with  wound care today. I encouraged him to keep this given the subjective fevers at home. We will follow the urine cultures and wound cultures. We will also get CRP and a sed rate, CBC with differential, and blood cultures today to follow. " Stephon's office eventually told him yesterday that they would send in Keflex prescription, but this has not been done yet, at least as of last night. If his labs look bad or his wound seems to worsen and he has not been able to  the prescription yet, I will plan to call over to Dr. Szymanski's office to check on the status. He has an appointment there on Monday, 09/13/2021, and we will plan to keep this.    There are no Patient Instructions on file for this visit.    No follow-ups on file.    Ambulatory progress note signed and attested to by Kevin Garcia D.O.         Transcribed from ambient dictation for Kevin Garcia DO by Radha Cohen.  09/09/21   12:18 EDT    I have personally performed the services described in this document as transcribed by the above individual, and it is both accurate and complete.  Kevin Garcia DO  9/21/2021  07:46 EDT

## 2021-09-09 NOTE — TELEPHONE ENCOUNTER
Spoke with Jennifer after hours last night, sent in order for wound culture and urine culture. Will see patient today, and he has a wound clinic appt at 1pm

## 2021-09-10 LAB
CRP SERPL-MCNC: 10.13 MG/DL (ref 0–0.5)
ERYTHROCYTE [SEDIMENTATION RATE] IN BLOOD: 37 MM/HR (ref 0–20)

## 2021-09-11 LAB — BACTERIA SPEC AEROBE CULT: ABNORMAL

## 2021-09-14 LAB
BACTERIA SPEC AEROBE CULT: NORMAL
BACTERIA SPEC AEROBE CULT: NORMAL

## 2021-10-20 ENCOUNTER — TRANSCRIBE ORDERS (OUTPATIENT)
Dept: HOME HEALTH SERVICES | Facility: HOME HEALTHCARE | Age: 61
End: 2021-10-20

## 2021-10-20 ENCOUNTER — HOME HEALTH ADMISSION (OUTPATIENT)
Dept: HOME HEALTH SERVICES | Facility: HOME HEALTHCARE | Age: 61
End: 2021-10-20

## 2021-10-20 DIAGNOSIS — L89.313 STAGE III PRESSURE ULCER OF RIGHT BUTTOCK (HCC): Primary | ICD-10-CM

## 2021-11-02 ENCOUNTER — HOME HEALTH ADMISSION (OUTPATIENT)
Dept: HOME HEALTH SERVICES | Facility: HOME HEALTHCARE | Age: 61
End: 2021-11-02

## 2021-11-02 ENCOUNTER — TRANSCRIBE ORDERS (OUTPATIENT)
Dept: HOME HEALTH SERVICES | Facility: HOME HEALTHCARE | Age: 61
End: 2021-11-02

## 2021-11-02 DIAGNOSIS — L89.313 STAGE III PRESSURE ULCER OF RIGHT BUTTOCK (HCC): Primary | ICD-10-CM

## 2022-01-04 ENCOUNTER — LAB (OUTPATIENT)
Dept: LAB | Facility: HOSPITAL | Age: 62
End: 2022-01-04

## 2022-01-04 ENCOUNTER — TRANSCRIBE ORDERS (OUTPATIENT)
Dept: LAB | Facility: HOSPITAL | Age: 62
End: 2022-01-04

## 2022-01-04 DIAGNOSIS — L03.317 CELLULITIS AND ABSCESS OF BUTTOCK: ICD-10-CM

## 2022-01-04 DIAGNOSIS — L89.152 STAGE II PRESSURE ULCER OF SACRAL REGION: ICD-10-CM

## 2022-01-04 DIAGNOSIS — L02.31 CELLULITIS AND ABSCESS OF BUTTOCK: ICD-10-CM

## 2022-01-04 DIAGNOSIS — L89.152 STAGE II PRESSURE ULCER OF SACRAL REGION: Primary | ICD-10-CM

## 2022-01-04 LAB
ALBUMIN SERPL-MCNC: 4 G/DL (ref 3.5–5.2)
ALBUMIN/GLOB SERPL: 1.5 G/DL
ALP SERPL-CCNC: 51 U/L (ref 39–117)
ALT SERPL W P-5'-P-CCNC: 7 U/L (ref 1–41)
ANION GAP SERPL CALCULATED.3IONS-SCNC: 8.9 MMOL/L (ref 5–15)
AST SERPL-CCNC: 12 U/L (ref 1–40)
BASOPHILS # BLD AUTO: 0.08 10*3/MM3 (ref 0–0.2)
BASOPHILS NFR BLD AUTO: 0.6 % (ref 0–1.5)
BILIRUB SERPL-MCNC: 0.4 MG/DL (ref 0–1.2)
BUN SERPL-MCNC: 17 MG/DL (ref 8–23)
BUN/CREAT SERPL: 36.2 (ref 7–25)
CALCIUM SPEC-SCNC: 9.4 MG/DL (ref 8.6–10.5)
CHLORIDE SERPL-SCNC: 94 MMOL/L (ref 98–107)
CO2 SERPL-SCNC: 23.1 MMOL/L (ref 22–29)
CREAT SERPL-MCNC: 0.47 MG/DL (ref 0.76–1.27)
DEPRECATED RDW RBC AUTO: 41.9 FL (ref 37–54)
EOSINOPHIL # BLD AUTO: 0.3 10*3/MM3 (ref 0–0.4)
EOSINOPHIL NFR BLD AUTO: 2.3 % (ref 0.3–6.2)
ERYTHROCYTE [DISTWIDTH] IN BLOOD BY AUTOMATED COUNT: 13 % (ref 12.3–15.4)
GFR SERPL CREATININE-BSD FRML MDRD: >150 ML/MIN/1.73
GLOBULIN UR ELPH-MCNC: 2.7 GM/DL
GLUCOSE SERPL-MCNC: 135 MG/DL (ref 65–99)
HCT VFR BLD AUTO: 38.2 % (ref 37.5–51)
HGB BLD-MCNC: 12.6 G/DL (ref 13–17.7)
IMM GRANULOCYTES # BLD AUTO: 0.06 10*3/MM3 (ref 0–0.05)
IMM GRANULOCYTES NFR BLD AUTO: 0.5 % (ref 0–0.5)
LYMPHOCYTES # BLD AUTO: 0.85 10*3/MM3 (ref 0.7–3.1)
LYMPHOCYTES NFR BLD AUTO: 6.5 % (ref 19.6–45.3)
MCH RBC QN AUTO: 29 PG (ref 26.6–33)
MCHC RBC AUTO-ENTMCNC: 33 G/DL (ref 31.5–35.7)
MCV RBC AUTO: 88 FL (ref 79–97)
MONOCYTES # BLD AUTO: 0.95 10*3/MM3 (ref 0.1–0.9)
MONOCYTES NFR BLD AUTO: 7.3 % (ref 5–12)
NEUTROPHILS NFR BLD AUTO: 10.77 10*3/MM3 (ref 1.7–7)
NEUTROPHILS NFR BLD AUTO: 82.8 % (ref 42.7–76)
NRBC BLD AUTO-RTO: 0 /100 WBC (ref 0–0.2)
PLATELET # BLD AUTO: 277 10*3/MM3 (ref 140–450)
PMV BLD AUTO: 9.5 FL (ref 6–12)
POTASSIUM SERPL-SCNC: 4 MMOL/L (ref 3.5–5.2)
PROT SERPL-MCNC: 6.7 G/DL (ref 6–8.5)
RBC # BLD AUTO: 4.34 10*6/MM3 (ref 4.14–5.8)
SODIUM SERPL-SCNC: 126 MMOL/L (ref 136–145)
WBC NRBC COR # BLD: 13.01 10*3/MM3 (ref 3.4–10.8)

## 2022-01-04 PROCEDURE — 85025 COMPLETE CBC W/AUTO DIFF WBC: CPT

## 2022-01-04 PROCEDURE — 36415 COLL VENOUS BLD VENIPUNCTURE: CPT

## 2022-01-04 PROCEDURE — 86140 C-REACTIVE PROTEIN: CPT

## 2022-01-04 PROCEDURE — 85652 RBC SED RATE AUTOMATED: CPT

## 2022-01-04 PROCEDURE — 80053 COMPREHEN METABOLIC PANEL: CPT | Performed by: INTERNAL MEDICINE

## 2022-01-05 LAB
CRP SERPL-MCNC: 2.31 MG/DL (ref 0–0.5)
ERYTHROCYTE [SEDIMENTATION RATE] IN BLOOD: 38 MM/HR (ref 0–20)

## 2022-01-10 ENCOUNTER — LAB (OUTPATIENT)
Dept: LAB | Facility: HOSPITAL | Age: 62
End: 2022-01-10

## 2022-01-10 DIAGNOSIS — L08.9 WOUND INFECTION: Primary | ICD-10-CM

## 2022-01-10 DIAGNOSIS — T14.8XXA WOUND INFECTION: Primary | ICD-10-CM

## 2022-01-10 PROCEDURE — 87070 CULTURE OTHR SPECIMN AEROBIC: CPT

## 2022-01-10 PROCEDURE — 87186 SC STD MICRODIL/AGAR DIL: CPT

## 2022-01-10 PROCEDURE — 87147 CULTURE TYPE IMMUNOLOGIC: CPT

## 2022-01-10 PROCEDURE — 87205 SMEAR GRAM STAIN: CPT

## 2022-01-14 LAB
BACTERIA SPEC AEROBE CULT: ABNORMAL
BACTERIA SPEC AEROBE CULT: ABNORMAL
GRAM STN SPEC: ABNORMAL
GRAM STN SPEC: ABNORMAL

## 2022-01-18 ENCOUNTER — TRANSCRIBE ORDERS (OUTPATIENT)
Dept: ADMINISTRATIVE | Facility: HOSPITAL | Age: 62
End: 2022-01-18

## 2022-01-18 DIAGNOSIS — M46.28 OSTEOMYELITIS OF VERTEBRA, SACRAL AND SACROCOCCYGEAL REGION: Primary | ICD-10-CM

## 2022-01-19 ENCOUNTER — HOSPITAL ENCOUNTER (OUTPATIENT)
Dept: INFUSION THERAPY | Facility: HOSPITAL | Age: 62
Discharge: HOME | End: 2022-01-19
Admitting: INTERNAL MEDICINE

## 2022-01-19 VITALS
HEART RATE: 61 BPM | SYSTOLIC BLOOD PRESSURE: 84 MMHG | DIASTOLIC BLOOD PRESSURE: 59 MMHG | OXYGEN SATURATION: 96 % | RESPIRATION RATE: 16 BRPM | TEMPERATURE: 97 F

## 2022-01-19 DIAGNOSIS — M46.28 OSTEOMYELITIS OF VERTEBRA, SACRAL AND SACROCOCCYGEAL REGION: ICD-10-CM

## 2022-01-19 LAB
ALBUMIN SERPL-MCNC: 3.7 G/DL (ref 3.5–5.2)
ALBUMIN/GLOB SERPL: 1.8 G/DL
ALP SERPL-CCNC: 47 U/L (ref 39–117)
ALT SERPL W P-5'-P-CCNC: 9 U/L (ref 1–41)
ANION GAP SERPL CALCULATED.3IONS-SCNC: 8 MMOL/L (ref 5–15)
AST SERPL-CCNC: 13 U/L (ref 1–40)
BASOPHILS # BLD AUTO: 0.05 10*3/MM3 (ref 0–0.2)
BASOPHILS NFR BLD AUTO: 0.8 % (ref 0–1.5)
BILIRUB SERPL-MCNC: 0.4 MG/DL (ref 0–1.2)
BUN SERPL-MCNC: 15 MG/DL (ref 8–23)
BUN/CREAT SERPL: 25.9 (ref 7–25)
CALCIUM SPEC-SCNC: 8.9 MG/DL (ref 8.6–10.5)
CHLORIDE SERPL-SCNC: 99 MMOL/L (ref 98–107)
CO2 SERPL-SCNC: 26 MMOL/L (ref 22–29)
CREAT SERPL-MCNC: 0.58 MG/DL (ref 0.76–1.27)
D-LACTATE SERPL-SCNC: 1.5 MMOL/L (ref 0.5–2)
DEPRECATED RDW RBC AUTO: 47.2 FL (ref 37–54)
EOSINOPHIL # BLD AUTO: 0.22 10*3/MM3 (ref 0–0.4)
EOSINOPHIL NFR BLD AUTO: 3.7 % (ref 0.3–6.2)
ERYTHROCYTE [DISTWIDTH] IN BLOOD BY AUTOMATED COUNT: 14.2 % (ref 12.3–15.4)
GFR SERPL CREATININE-BSD FRML MDRD: 142 ML/MIN/1.73
GLOBULIN UR ELPH-MCNC: 2.1 GM/DL
GLUCOSE BLDC GLUCOMTR-MCNC: 88 MG/DL (ref 70–130)
GLUCOSE SERPL-MCNC: 111 MG/DL (ref 65–99)
HCT VFR BLD AUTO: 37.6 % (ref 37.5–51)
HGB BLD-MCNC: 12.2 G/DL (ref 13–17.7)
IMM GRANULOCYTES # BLD AUTO: 0.02 10*3/MM3 (ref 0–0.05)
IMM GRANULOCYTES NFR BLD AUTO: 0.3 % (ref 0–0.5)
LYMPHOCYTES # BLD AUTO: 1.3 10*3/MM3 (ref 0.7–3.1)
LYMPHOCYTES NFR BLD AUTO: 21.6 % (ref 19.6–45.3)
MCH RBC QN AUTO: 29.3 PG (ref 26.6–33)
MCHC RBC AUTO-ENTMCNC: 32.4 G/DL (ref 31.5–35.7)
MCV RBC AUTO: 90.4 FL (ref 79–97)
MONOCYTES # BLD AUTO: 0.15 10*3/MM3 (ref 0.1–0.9)
MONOCYTES NFR BLD AUTO: 2.5 % (ref 5–12)
NEUTROPHILS NFR BLD AUTO: 4.28 10*3/MM3 (ref 1.7–7)
NEUTROPHILS NFR BLD AUTO: 71.1 % (ref 42.7–76)
NRBC BLD AUTO-RTO: 0 /100 WBC (ref 0–0.2)
PLATELET # BLD AUTO: 251 10*3/MM3 (ref 140–450)
PMV BLD AUTO: 8.9 FL (ref 6–12)
POTASSIUM SERPL-SCNC: 4.4 MMOL/L (ref 3.5–5.2)
PROT SERPL-MCNC: 5.8 G/DL (ref 6–8.5)
RBC # BLD AUTO: 4.16 10*6/MM3 (ref 4.14–5.8)
SODIUM SERPL-SCNC: 133 MMOL/L (ref 136–145)
WBC NRBC COR # BLD: 6.02 10*3/MM3 (ref 3.4–10.8)

## 2022-01-19 PROCEDURE — C1751 CATH, INF, PER/CENT/MIDLINE: HCPCS

## 2022-01-19 PROCEDURE — 85025 COMPLETE CBC W/AUTO DIFF WBC: CPT | Performed by: INTERNAL MEDICINE

## 2022-01-19 PROCEDURE — 82962 GLUCOSE BLOOD TEST: CPT

## 2022-01-19 PROCEDURE — 83605 ASSAY OF LACTIC ACID: CPT | Performed by: INTERNAL MEDICINE

## 2022-01-19 PROCEDURE — 80053 COMPREHEN METABOLIC PANEL: CPT | Performed by: INTERNAL MEDICINE

## 2022-01-19 PROCEDURE — C1894 INTRO/SHEATH, NON-LASER: HCPCS

## 2022-01-19 RX ORDER — SODIUM CHLORIDE 0.9 % (FLUSH) 0.9 %
10 SYRINGE (ML) INJECTION AS NEEDED
Status: DISCONTINUED | OUTPATIENT
Start: 2022-01-19 | End: 2022-01-21 | Stop reason: HOSPADM

## 2022-01-19 NOTE — PROGRESS NOTES
Placed by YISSEL Hackett RN - confirmed with 3cg.  RUE single lumen PICC with 42cm total and 0cm exposed.

## 2022-01-19 NOTE — NURSING NOTE
Patient experienced hypotension after PICC placement. BP did not respond to positioning, time.  After consulting with Dr Szymanski and receiving verbal orders, NS 500ml IV bolus given. Labs drawn from PICC line. Pt responded to fluid challenge. Patient also sat up in wheelchair for 15   mins prior to departure and maintained adequate BP.  Lab results and BP reported to BRITTNI Antonio (ID PICC RN).

## 2022-01-28 ENCOUNTER — CLINICAL SUPPORT (OUTPATIENT)
Dept: FAMILY MEDICINE CLINIC | Facility: CLINIC | Age: 62
End: 2022-01-28

## 2022-01-28 DIAGNOSIS — Z01.818 PREOP TESTING: Primary | ICD-10-CM

## 2022-01-28 PROCEDURE — U0005 INFEC AGEN DETEC AMPLI PROBE: HCPCS | Performed by: FAMILY MEDICINE

## 2022-01-28 PROCEDURE — U0004 COV-19 TEST NON-CDC HGH THRU: HCPCS | Performed by: FAMILY MEDICINE

## 2022-01-29 LAB — SARS-COV-2 RNA NOSE QL NAA+PROBE: NOT DETECTED

## 2022-03-04 ENCOUNTER — TELEPHONE (OUTPATIENT)
Dept: FAMILY MEDICINE CLINIC | Facility: CLINIC | Age: 62
End: 2022-03-04

## 2022-04-27 ENCOUNTER — TRANSCRIBE ORDERS (OUTPATIENT)
Dept: LAB | Facility: HOSPITAL | Age: 62
End: 2022-04-27

## 2022-04-27 ENCOUNTER — LAB (OUTPATIENT)
Dept: LAB | Facility: HOSPITAL | Age: 62
End: 2022-04-27

## 2022-04-27 DIAGNOSIS — L03.90 CELLULITIS DUE TO MRSA: ICD-10-CM

## 2022-04-27 DIAGNOSIS — L89.153 STAGE III PRESSURE ULCER OF SACRAL REGION: ICD-10-CM

## 2022-04-27 DIAGNOSIS — L89.152 STAGE II PRESSURE ULCER OF SACRAL REGION: Primary | ICD-10-CM

## 2022-04-27 DIAGNOSIS — B95.62 CELLULITIS DUE TO MRSA: ICD-10-CM

## 2022-04-27 DIAGNOSIS — L03.317 CELLULITIS AND ABSCESS OF BUTTOCK: ICD-10-CM

## 2022-04-27 DIAGNOSIS — L02.31 CELLULITIS AND ABSCESS OF BUTTOCK: ICD-10-CM

## 2022-04-27 DIAGNOSIS — L89.152 STAGE II PRESSURE ULCER OF SACRAL REGION: ICD-10-CM

## 2022-04-27 LAB
ALBUMIN SERPL-MCNC: 4 G/DL (ref 3.5–5.2)
ALBUMIN/GLOB SERPL: 1.6 G/DL
ALP SERPL-CCNC: 51 U/L (ref 39–117)
ALT SERPL W P-5'-P-CCNC: 14 U/L (ref 1–41)
ANION GAP SERPL CALCULATED.3IONS-SCNC: 11.6 MMOL/L (ref 5–15)
AST SERPL-CCNC: 17 U/L (ref 1–40)
BASOPHILS # BLD AUTO: 0.09 10*3/MM3 (ref 0–0.2)
BASOPHILS NFR BLD AUTO: 1.4 % (ref 0–1.5)
BILIRUB SERPL-MCNC: 0.3 MG/DL (ref 0–1.2)
BUN SERPL-MCNC: 10 MG/DL (ref 8–23)
BUN/CREAT SERPL: 30.3 (ref 7–25)
CALCIUM SPEC-SCNC: 8.7 MG/DL (ref 8.6–10.5)
CHLORIDE SERPL-SCNC: 95 MMOL/L (ref 98–107)
CO2 SERPL-SCNC: 21.4 MMOL/L (ref 22–29)
CREAT SERPL-MCNC: 0.33 MG/DL (ref 0.76–1.27)
CRP SERPL-MCNC: 0.45 MG/DL (ref 0–0.5)
DEPRECATED RDW RBC AUTO: 45.7 FL (ref 37–54)
EGFRCR SERPLBLD CKD-EPI 2021: 129.9 ML/MIN/1.73
EOSINOPHIL # BLD AUTO: 0.37 10*3/MM3 (ref 0–0.4)
EOSINOPHIL NFR BLD AUTO: 5.6 % (ref 0.3–6.2)
ERYTHROCYTE [DISTWIDTH] IN BLOOD BY AUTOMATED COUNT: 14.5 % (ref 12.3–15.4)
ERYTHROCYTE [SEDIMENTATION RATE] IN BLOOD: 8 MM/HR (ref 0–20)
GLOBULIN UR ELPH-MCNC: 2.5 GM/DL
GLUCOSE SERPL-MCNC: 79 MG/DL (ref 65–99)
HCT VFR BLD AUTO: 34.6 % (ref 37.5–51)
HGB BLD-MCNC: 11.7 G/DL (ref 13–17.7)
IMM GRANULOCYTES # BLD AUTO: 0.02 10*3/MM3 (ref 0–0.05)
IMM GRANULOCYTES NFR BLD AUTO: 0.3 % (ref 0–0.5)
LYMPHOCYTES # BLD AUTO: 1.93 10*3/MM3 (ref 0.7–3.1)
LYMPHOCYTES NFR BLD AUTO: 29.5 % (ref 19.6–45.3)
MCH RBC QN AUTO: 29.1 PG (ref 26.6–33)
MCHC RBC AUTO-ENTMCNC: 33.8 G/DL (ref 31.5–35.7)
MCV RBC AUTO: 86.1 FL (ref 79–97)
MONOCYTES # BLD AUTO: 0.66 10*3/MM3 (ref 0.1–0.9)
MONOCYTES NFR BLD AUTO: 10.1 % (ref 5–12)
NEUTROPHILS NFR BLD AUTO: 3.48 10*3/MM3 (ref 1.7–7)
NEUTROPHILS NFR BLD AUTO: 53.1 % (ref 42.7–76)
NRBC BLD AUTO-RTO: 0 /100 WBC (ref 0–0.2)
PLATELET # BLD AUTO: 305 10*3/MM3 (ref 140–450)
PMV BLD AUTO: 9.5 FL (ref 6–12)
POTASSIUM SERPL-SCNC: 4.4 MMOL/L (ref 3.5–5.2)
PROT SERPL-MCNC: 6.5 G/DL (ref 6–8.5)
RBC # BLD AUTO: 4.02 10*6/MM3 (ref 4.14–5.8)
SODIUM SERPL-SCNC: 128 MMOL/L (ref 136–145)
WBC NRBC COR # BLD: 6.55 10*3/MM3 (ref 3.4–10.8)

## 2022-04-27 PROCEDURE — 36415 COLL VENOUS BLD VENIPUNCTURE: CPT

## 2022-04-27 PROCEDURE — 80053 COMPREHEN METABOLIC PANEL: CPT

## 2022-04-27 PROCEDURE — 85652 RBC SED RATE AUTOMATED: CPT

## 2022-04-27 PROCEDURE — 86140 C-REACTIVE PROTEIN: CPT

## 2022-04-27 PROCEDURE — 85025 COMPLETE CBC W/AUTO DIFF WBC: CPT

## 2022-05-18 DIAGNOSIS — L89.152 SACRAL DECUBITUS ULCER, STAGE II: ICD-10-CM

## 2022-05-18 DIAGNOSIS — G82.20 PARAPLEGIA: Primary | ICD-10-CM

## 2022-05-19 ENCOUNTER — LAB (OUTPATIENT)
Dept: LAB | Facility: HOSPITAL | Age: 62
End: 2022-05-19

## 2022-05-19 DIAGNOSIS — G82.20 PARAPLEGIA: ICD-10-CM

## 2022-05-19 DIAGNOSIS — L89.152 SACRAL DECUBITUS ULCER, STAGE II: ICD-10-CM

## 2022-05-19 LAB
ALBUMIN SERPL-MCNC: 4.1 G/DL (ref 3.5–5.2)
ALBUMIN/GLOB SERPL: 1.5 G/DL
ALP SERPL-CCNC: 48 U/L (ref 39–117)
ALT SERPL W P-5'-P-CCNC: 12 U/L (ref 1–41)
ANION GAP SERPL CALCULATED.3IONS-SCNC: 15.5 MMOL/L (ref 5–15)
AST SERPL-CCNC: 19 U/L (ref 1–40)
BASOPHILS # BLD AUTO: 0.09 10*3/MM3 (ref 0–0.2)
BASOPHILS NFR BLD AUTO: 1.6 % (ref 0–1.5)
BILIRUB SERPL-MCNC: 0.3 MG/DL (ref 0–1.2)
BUN SERPL-MCNC: 4 MG/DL (ref 8–23)
BUN/CREAT SERPL: 8.3 (ref 7–25)
CALCIUM SPEC-SCNC: 9.1 MG/DL (ref 8.6–10.5)
CHLORIDE SERPL-SCNC: 92 MMOL/L (ref 98–107)
CO2 SERPL-SCNC: 18.5 MMOL/L (ref 22–29)
CREAT SERPL-MCNC: 0.48 MG/DL (ref 0.76–1.27)
CRP SERPL-MCNC: 0.54 MG/DL (ref 0–0.5)
DEPRECATED RDW RBC AUTO: 47.6 FL (ref 37–54)
EGFRCR SERPLBLD CKD-EPI 2021: 116 ML/MIN/1.73
EOSINOPHIL # BLD AUTO: 0.34 10*3/MM3 (ref 0–0.4)
EOSINOPHIL NFR BLD AUTO: 6 % (ref 0.3–6.2)
ERYTHROCYTE [DISTWIDTH] IN BLOOD BY AUTOMATED COUNT: 14.2 % (ref 12.3–15.4)
ERYTHROCYTE [SEDIMENTATION RATE] IN BLOOD: 14 MM/HR (ref 0–20)
GLOBULIN UR ELPH-MCNC: 2.8 GM/DL
GLUCOSE SERPL-MCNC: 145 MG/DL (ref 65–99)
HCT VFR BLD AUTO: 38.5 % (ref 37.5–51)
HGB BLD-MCNC: 12.7 G/DL (ref 13–17.7)
IMM GRANULOCYTES # BLD AUTO: 0.02 10*3/MM3 (ref 0–0.05)
IMM GRANULOCYTES NFR BLD AUTO: 0.4 % (ref 0–0.5)
LYMPHOCYTES # BLD AUTO: 1.25 10*3/MM3 (ref 0.7–3.1)
LYMPHOCYTES NFR BLD AUTO: 22.2 % (ref 19.6–45.3)
MCH RBC QN AUTO: 30 PG (ref 26.6–33)
MCHC RBC AUTO-ENTMCNC: 33 G/DL (ref 31.5–35.7)
MCV RBC AUTO: 90.8 FL (ref 79–97)
MONOCYTES # BLD AUTO: 0.39 10*3/MM3 (ref 0.1–0.9)
MONOCYTES NFR BLD AUTO: 6.9 % (ref 5–12)
NEUTROPHILS NFR BLD AUTO: 3.55 10*3/MM3 (ref 1.7–7)
NEUTROPHILS NFR BLD AUTO: 62.9 % (ref 42.7–76)
NRBC BLD AUTO-RTO: 0 /100 WBC (ref 0–0.2)
PLATELET # BLD AUTO: 232 10*3/MM3 (ref 140–450)
PMV BLD AUTO: 9.9 FL (ref 6–12)
POTASSIUM SERPL-SCNC: 4 MMOL/L (ref 3.5–5.2)
PROT SERPL-MCNC: 6.9 G/DL (ref 6–8.5)
RBC # BLD AUTO: 4.24 10*6/MM3 (ref 4.14–5.8)
SODIUM SERPL-SCNC: 126 MMOL/L (ref 136–145)
WBC NRBC COR # BLD: 5.64 10*3/MM3 (ref 3.4–10.8)

## 2022-05-19 PROCEDURE — 80053 COMPREHEN METABOLIC PANEL: CPT

## 2022-05-19 PROCEDURE — 86140 C-REACTIVE PROTEIN: CPT

## 2022-05-19 PROCEDURE — 36415 COLL VENOUS BLD VENIPUNCTURE: CPT

## 2022-05-19 PROCEDURE — 85025 COMPLETE CBC W/AUTO DIFF WBC: CPT

## 2022-05-19 PROCEDURE — 85652 RBC SED RATE AUTOMATED: CPT

## 2022-11-02 NOTE — PROGRESS NOTES
[facility]  PROGRESS NOTE    Patient Name: Baudilio Toledo  : 1960  MRN: [id number]    Date of Admission: 2019  Length of Stay: 1  Primary Care Physician: Kevin Garcia DO    Subjective   Subjective     CC:  F/u infected sacral decub    HPI:  No changes overnight.  No fever.  He has questions about plan going forward.    Review of Systems  Gen- No fevers, chills  CV- No chest pain, palpitations  Resp- No cough, dyspnea  GI- No N/V/D, abd pain    Otherwise ROS is negative except as mentioned in the HPI.    Objective   Objective     Vital Signs:   Temp:  [97.3 °F (36.3 °C)-99.7 °F (37.6 °C)] 97.9 °F (36.6 °C)  Heart Rate:  [74-92] 79  Resp:  [16-18] 16  BP: ()/(44-89) 139/74        Physical Exam:  Constitutional: No acute distress, awake, alert, lying in bed  HENT: NCAT, mucous membranes moist  Respiratory: Clear to auscultation bilaterally, respiratory effort normal   Cardiovascular: RRR, no murmurs, rubs, or gallops  Gastrointestinal: Positive bowel sounds, soft, nontender, nondistended  Musculoskeletal: No bilateral ankle edema, contractures.  Deferred wound exam at this time.  Psychiatric: Appropriate affect, cooperative  Neurologic: Oriented x 3, consistent with known quadraplegia  Skin: No rashes        Results Reviewed:  I have personally reviewed current lab, radiology, and data and agree.    Results from last 7 days   Lab Units 19  0645 19  1235 19  1348   WBC 10*3/mm3 9.35 15.86* 22.48*   HEMOGLOBIN g/dL 9.7* 10.4* 10.7*   HEMATOCRIT % 28.5* 30.2* 31.7*   PLATELETS 10*3/mm3 305 284 370     Results from last 7 days   Lab Units 19  1150 19  0645 19  0008  19  1235 19  1348   SODIUM mmol/L 126* 124* 122*   < > 121* 121*   POTASSIUM mmol/L  --  3.9  --   --  3.9 4.4   CHLORIDE mmol/L  --  95*  --   --  90* 90*   CO2 mmol/L  --  23.0  --   --  22.0 21.0   BUN mg/dL  --  29*  --   --  38* 43*    CREATININE mg/dL  --  1.19  --   --  1.39* 1.69*   GLUCOSE mg/dL  --  107*  --   --  108* 124*   CALCIUM mg/dL  --  8.1*  --   --  8.6* 8.2*   ALT (SGPT) U/L  --  16  --   --  18 18   AST (SGOT) U/L  --  18  --   --  21 16    < > = values in this interval not displayed.     Estimated Creatinine Clearance: 76.2 mL/min (by C-G formula based on SCr of 1.19 mg/dL).    No results found for: BNP    Microbiology Results Abnormal     Procedure Component Value - Date/Time    Wound Culture - Wound, Buttock, Right [[id number]] Collected:  02/02/19 1246    Lab Status:  Preliminary result Specimen:  Wound from Buttock, Right Updated:  02/03/19 0911     Wound Culture Culture in progress     Gram Stain No WBCs or organisms seen    Urine Culture - Urine, Urine, Catheter [[id number]]  (Abnormal) Collected:  02/02/19 1245    Lab Status:  Preliminary result Specimen:  Urine, Catheter Updated:  02/03/19 0713     Urine Culture <10,000 CFU/mL Gram Negative Bacilli    Blood Culture - Blood, Wrist, Left [[id number]] Collected:  02/02/19 1235    Lab Status:  Preliminary result Specimen:  Blood from Wrist, Left Updated:  02/03/19 0206     Blood Culture No growth at less than 24 hours    Blood Culture - Blood, Wrist, Right [[id number]] Collected:  02/02/19 1235    Lab Status:  Preliminary result Specimen:  Blood from Wrist, Right Updated:  02/03/19 0206     Blood Culture No growth at less than 24 hours          Imaging Results (last 24 hours)     Procedure Component Value Units Date/Time    CT Pelvis With Contrast [[id number]] Collected:  02/02/19 1430     Updated:  02/02/19 1529    Narrative:       EXAMINATION: CT PELVIS W/CONTRAST - 2/2/2019      INDICATION:  L89.314-Pressure ulcer of right buttock, stage 4;  L03.317-Cellulitis of buttock; D72.829-Elevated white blood cell count,  unspecified; G82.50-Quadriplegia, unspecified.     TECHNIQUE:  Axial CT data of the pelvis were acquired helically  following IV contrast administration. Multiplanar  reformatted images  were generated and reviewed. The radiation dose reduction device was  turned on for each scan per the ALARA (As Low as Reasonably Achievable)  protocol.     COMPARISONS:  None.     FINDINGS: There is a deep ischial decubitus ulcer tracking from the skin  surface down to bone. There is peripherally enhancing fluid along the  tract, most likely infected. There are bony changes of the ischial  tuberosity compatible with osteomyelitis. There is myositis involving  the right gluteal musculature as well. Inflammation in the right  buttock. There is a small left greater trochanteric decubitus ulcer  without involvement of the bone. There is no acute fracture. In the  pelvis there is no free fluid or dilated bowel.       Impression:       1. Right ischial decubitus ulcer with associated osteomyelitis.  2. Right gluteal myositis.     DICTATED:   2/2/2019  EDITED/ls :   2/2/2019      This report was finalized on 2/2/2019 3:27 PM by Karthik Retana.       CT Lower Extremity Right With Contrast [[id number]] Collected:  02/02/19 1453     Updated:  02/02/19 1529    Narrative:       EXAMINATION: CT RIGHT LOWER EXTREMITY WITH  CONTRAST - 2/2/2019     INDICATION: L89.314-Pressure ulcer of right buttock, stage 4;  L03.317-Cellulitis of buttock; D72.829-Elevated white blood cell count,  unspecified; G82.50-Quadriplegia, unspecified.     TECHNIQUE:  Axial CT data of the right lower extremity (femur) were  acquired helically with contrast.  Multiplanar reformatted images were  generated and reviewed. The radiation dose reduction device was turned  on for each scan per the ALARA (As Low as Reasonably Achievable)  protocol     COMPARISONS:  None.     FINDINGS: For findings in the pelvis and gluteal musculature, please  refer to the pelvis CT report. Regarding the femur there is mild  degenerative change at the hip joint. There is femoropopliteal  atherosclerosis. There is no abscess along the right side. There  is  degenerative change of the knee as well. There is atrophy of the thigh  musculature.       Impression:       Degenerative change of the hip and knee. No acute osseous  abnormality. No abscess or cellulitis in the thigh.     DICTATED:   2/2/2019  EDITED/ls :   2/2/2019      This report was finalized on 2/2/2019 3:27 PM by Karthik Retana.                  I have reviewed the medications:    Current Facility-Administered Medications:   •  acetaminophen (TYLENOL) tablet 650 mg, 650 mg, Oral, Q4H PRN, Roxanna Mehta MD  •  cholecalciferol (VITAMIN D3) tablet 2,000 Units, 2,000 Units, Oral, Daily, Roxanna Mehta MD, 2,000 Units at 02/03/19 0802  •  heparin (porcine) 5000 UNIT/ML injection 5,000 Units, 5,000 Units, Subcutaneous, Q8H, Roxanna Mehta MD, 5,000 Units at 02/03/19 0656  •  ketotifen (ZADITOR) 0.025 % ophthalmic solution 1 drop, 1 drop, Both Eyes, BID, Roxanna Mehta MD, 1 drop at 02/03/19 1243  •  Magnesium Sulfate 2 gram Bolus, followed by 8 gram infusion (total Mg dose 10 grams)- Mg less than or equal to 1mg/dL, 2 g, Intravenous, PRN **OR** Magnesium Sulfate 2 gram / 50mL Infusion (GIVE X 3 BAGS TO EQUAL 6GM TOTAL DOSE) - Mg 1.1 - 1.5 mg/dl, 2 g, Intravenous, PRN **OR** Magnesium Sulfate 4 gram infusion- Mg 1.6-1.9 mg/dL, 4 g, Intravenous, PRN, Roxanna Mehta MD  •  multivitamin with minerals 1 tablet, 1 tablet, Oral, Daily, Roxanna Mehta MD, 1 tablet at 02/03/19 0802  •  ondansetron (ZOFRAN) tablet 4 mg, 4 mg, Oral, Q6H PRN **OR** ondansetron (ZOFRAN) injection 4 mg, 4 mg, Intravenous, Q6H PRN, Roxanna Mehta MD  •  oxybutynin (DITROPAN) tablet 5 mg, 5 mg, Oral, TID, Roxanna Mehta MD, 5 mg at 02/03/19 0802  •  [DISCONTINUED] vancomycin 1750 mg/500 mL 0.9% NS IVPB ([facility]), 20 mg/kg, Intravenous, Once **AND** Pharmacy to dose vancomycin, , Does not apply, Continuous PRN, Roxanna Mehta MD  •  piperacillin-tazobactam (ZOSYN) 3.375 g in iso-osmotic dextrose 50 ml (premix),  3.375 g, Intravenous, Q8H, Roxanna Mehta MD, 3.375 g at 02/03/19 0657  •  potassium chloride (MICRO-K) CR capsule 40 mEq, 40 mEq, Oral, PRN **OR** potassium chloride (KLOR-CON) packet 40 mEq, 40 mEq, Oral, PRN **OR** potassium chloride 10 mEq in 100 mL IVPB, 10 mEq, Intravenous, Q1H PRN, Roxanna Mehta MD  •  sennosides-docusate sodium (SENOKOT-S) 8.6-50 MG tablet 2 tablet, 2 tablet, Oral, Nightly, Roxanna Mehta MD, 2 tablet at 02/02/19 2039  •  [COMPLETED] Insert peripheral IV, , , Once **AND** sodium chloride 0.9 % flush 10 mL, 10 mL, Intravenous, PRN, eLydi Swift APRN  •  sodium chloride 0.9 % flush 3 mL, 3 mL, Intravenous, Q12H, Roxanna Mheta MD, 3 mL at 02/03/19 0806  •  sodium chloride 0.9 % flush 3-10 mL, 3-10 mL, Intravenous, PRN, Roxanna Mehta MD  •  sodium chloride 0.9 % infusion, 100 mL/hr, Intravenous, Continuous, Roxanna Mehta MD, Last Rate: 100 mL/hr at 02/03/19 0802, 100 mL/hr at 02/03/19 0802  •  vancomycin (dosing per levels), , Does not apply, Daily, Ivonne Damian, [facility]  •  vitamin B-12 (CYANOCOBALAMIN) tablet 1,000 mcg, 1,000 mcg, Oral, Daily, Roxanna Mehta MD, 1,000 mcg at 02/03/19 0802      Assessment/Plan   Assessment / Plan     Active Hospital Problems    Diagnosis Date Noted   • Decubitus ulcer of sacral area [L89.159] 02/02/2019   • Cellulitis [L03.90] 02/02/2019   • Hyponatremia [E87.1] 02/02/2019   • Dysautonomia (CMS/HCC) [G90.1] 02/02/2019   • Cellulitis and abscess of buttock [L02.31, L03.317] 02/02/2019   • JUANA (acute kidney injury) (CMS/[facility]) [N17.9] 05/06/2017   • Paraplegia (CMS/[facility]) [G82.20] 05/31/2016          Brief Hospital Course to date:  Baudilio Toledo is a 58 y.o. male with paraplegia since 1983 presents with sacral ulcer infection, ARF, and hyponatremia.      Infected Sacral Ulcer with Cellulitis and probable osteomyelitis  - appreciate ID eval.  Continue Vanc/zosyn for now and await any cultures.  - MRI ordered  - may ultimately need  transfer to  for debridment       JUANA  - improving after hydration, renal following     Hyponatremia  - improving on IVF  - renal following     UTI  - Patient reports that he develops dysautonomia when he has a UTI. Urine culture growing E. Coli. Susceptible to zosyn as above.  Per ID      Paraplegia with dysautonomia  - Complicates care. Will need air mattress to avoid additional ulcers. Reports that he developed an ulcer overnight while in hospital last.   - Requires self caths.        DVT Prophylaxis:  Sq heparin    Disposition: I expect the patient to be discharged when medically ready, may need transfer to .    CODE STATUS:   Code Status and Medical Interventions:   Ordered at: 02/02/19 1351     Level Of Support Discussed With:    Patient     Code Status:    CPR     Medical Interventions (Level of Support Prior to Arrest):    Full         Electronically signed by Danilo Freitas MD, 02/03/19, 2:00 PM.       Rinvoq Pregnancy And Lactation Text: Based on animal studies, Rinvoq may cause embryo-fetal harm when administered to pregnant women.  The medication should not be used in pregnancy.  Breastfeeding is not recommended during treatment and for 6 days after the last dose.

## 2024-03-12 ENCOUNTER — LAB (OUTPATIENT)
Dept: LAB | Facility: HOSPITAL | Age: 64
End: 2024-03-12
Payer: MEDICARE

## 2024-03-12 ENCOUNTER — OFFICE VISIT (OUTPATIENT)
Dept: FAMILY MEDICINE CLINIC | Facility: CLINIC | Age: 64
End: 2024-03-12
Payer: MEDICARE

## 2024-03-12 VITALS — SYSTOLIC BLOOD PRESSURE: 100 MMHG | DIASTOLIC BLOOD PRESSURE: 68 MMHG

## 2024-03-12 DIAGNOSIS — Z13.220 SCREENING FOR HYPERLIPIDEMIA: ICD-10-CM

## 2024-03-12 DIAGNOSIS — Z12.5 SCREENING PSA (PROSTATE SPECIFIC ANTIGEN): ICD-10-CM

## 2024-03-12 DIAGNOSIS — Z13.29 SCREENING FOR ENDOCRINE DISORDER: ICD-10-CM

## 2024-03-12 DIAGNOSIS — Z13.89 SCREENING FOR BLOOD OR PROTEIN IN URINE: ICD-10-CM

## 2024-03-12 DIAGNOSIS — E53.8 COBALAMIN DEFICIENCY: Primary | ICD-10-CM

## 2024-03-12 DIAGNOSIS — E56.9 VITAMIN DEFICIENCY: ICD-10-CM

## 2024-03-12 DIAGNOSIS — E55.9 VITAMIN D DEFICIENCY: ICD-10-CM

## 2024-03-12 DIAGNOSIS — Z13.0 SCREENING FOR DEFICIENCY ANEMIA: ICD-10-CM

## 2024-03-12 DIAGNOSIS — G82.20 PARAPLEGIA: ICD-10-CM

## 2024-03-12 LAB
25(OH)D3 SERPL-MCNC: 23.7 NG/ML (ref 30–100)
VIT B12 BLD-MCNC: 335 PG/ML (ref 211–946)

## 2024-03-12 PROCEDURE — 83036 HEMOGLOBIN GLYCOSYLATED A1C: CPT

## 2024-03-12 PROCEDURE — 85027 COMPLETE CBC AUTOMATED: CPT

## 2024-03-12 PROCEDURE — 82306 VITAMIN D 25 HYDROXY: CPT

## 2024-03-12 PROCEDURE — G0103 PSA SCREENING: HCPCS

## 2024-03-12 PROCEDURE — 99213 OFFICE O/P EST LOW 20 MIN: CPT | Performed by: FAMILY MEDICINE

## 2024-03-12 PROCEDURE — 82607 VITAMIN B-12: CPT

## 2024-03-12 PROCEDURE — 84443 ASSAY THYROID STIM HORMONE: CPT

## 2024-03-12 PROCEDURE — 80053 COMPREHEN METABOLIC PANEL: CPT

## 2024-03-12 PROCEDURE — 80061 LIPID PANEL: CPT
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
## 2024-03-13 ENCOUNTER — LAB (OUTPATIENT)
Dept: LAB | Facility: HOSPITAL | Age: 64
End: 2024-03-13
Payer: MEDICARE

## 2024-03-13 DIAGNOSIS — Z13.89 SCREENING FOR BLOOD OR PROTEIN IN URINE: ICD-10-CM

## 2024-03-13 LAB
ALBUMIN SERPL-MCNC: 4 G/DL (ref 3.5–5.2)
ALBUMIN/GLOB SERPL: 1.6 G/DL
ALP SERPL-CCNC: 46 U/L (ref 39–117)
ALT SERPL W P-5'-P-CCNC: 11 U/L (ref 1–41)
ANION GAP SERPL CALCULATED.3IONS-SCNC: 12.2 MMOL/L (ref 5–15)
AST SERPL-CCNC: 21 U/L (ref 1–40)
BILIRUB SERPL-MCNC: 0.6 MG/DL (ref 0–1.2)
BILIRUB UR QL STRIP: NEGATIVE
BUN SERPL-MCNC: 8 MG/DL (ref 8–23)
BUN/CREAT SERPL: 20 (ref 7–25)
CALCIUM SPEC-SCNC: 8.8 MG/DL (ref 8.6–10.5)
CHLORIDE SERPL-SCNC: 97 MMOL/L (ref 98–107)
CHOLEST SERPL-MCNC: 109 MG/DL (ref 0–200)
CLARITY UR: CLEAR
CO2 SERPL-SCNC: 21.8 MMOL/L (ref 22–29)
COLOR UR: YELLOW
CREAT SERPL-MCNC: 0.4 MG/DL (ref 0.76–1.27)
DEPRECATED RDW RBC AUTO: 44.6 FL (ref 37–54)
EGFRCR SERPLBLD CKD-EPI 2021: 121.1 ML/MIN/1.73
ERYTHROCYTE [DISTWIDTH] IN BLOOD BY AUTOMATED COUNT: 13.3 % (ref 12.3–15.4)
GLOBULIN UR ELPH-MCNC: 2.5 GM/DL
GLUCOSE SERPL-MCNC: 85 MG/DL (ref 65–99)
GLUCOSE UR STRIP-MCNC: NEGATIVE MG/DL
HBA1C MFR BLD: 4.7 % (ref 4.8–5.6)
HCT VFR BLD AUTO: 38.1 % (ref 37.5–51)
HDLC SERPL-MCNC: 68 MG/DL (ref 40–60)
HGB BLD-MCNC: 12.8 G/DL (ref 13–17.7)
HGB UR QL STRIP.AUTO: NEGATIVE
KETONES UR QL STRIP: NEGATIVE
LDLC SERPL CALC-MCNC: 28 MG/DL (ref 0–100)
LDLC/HDLC SERPL: 0.44 {RATIO}
LEUKOCYTE ESTERASE UR QL STRIP.AUTO: ABNORMAL
MCH RBC QN AUTO: 30.8 PG (ref 26.6–33)
MCHC RBC AUTO-ENTMCNC: 33.6 G/DL (ref 31.5–35.7)
MCV RBC AUTO: 91.8 FL (ref 79–97)
NITRITE UR QL STRIP: POSITIVE
PH UR STRIP.AUTO: 8 [PH] (ref 5–8)
PLATELET # BLD AUTO: 230 10*3/MM3 (ref 140–450)
PMV BLD AUTO: 9.6 FL (ref 6–12)
POTASSIUM SERPL-SCNC: 4.3 MMOL/L (ref 3.5–5.2)
PROT SERPL-MCNC: 6.5 G/DL (ref 6–8.5)
PROT UR QL STRIP: NEGATIVE
PSA SERPL-MCNC: 1.4 NG/ML (ref 0–4)
RBC # BLD AUTO: 4.15 10*6/MM3 (ref 4.14–5.8)
SODIUM SERPL-SCNC: 131 MMOL/L (ref 136–145)
SP GR UR STRIP: 1.01 (ref 1–1.03)
TRIGL SERPL-MCNC: 55 MG/DL (ref 0–150)
TSH SERPL DL<=0.05 MIU/L-ACNC: 1.16 UIU/ML (ref 0.27–4.2)
UROBILINOGEN UR QL STRIP: ABNORMAL
VLDLC SERPL-MCNC: 13 MG/DL (ref 5–40)
WBC NRBC COR # BLD AUTO: 7.77 10*3/MM3 (ref 3.4–10.8)

## 2024-03-13 PROCEDURE — 81001 URINALYSIS AUTO W/SCOPE: CPT

## 2024-03-14 LAB
BACTERIA UR QL AUTO: ABNORMAL /HPF
HYALINE CASTS UR QL AUTO: ABNORMAL /LPF
RBC # UR STRIP: ABNORMAL /HPF
REF LAB TEST METHOD: ABNORMAL
SQUAMOUS #/AREA URNS HPF: ABNORMAL /HPF
WBC # UR STRIP: ABNORMAL /HPF

## 2024-04-19 ENCOUNTER — HOSPITAL ENCOUNTER (INPATIENT)
Facility: HOSPITAL | Age: 64
LOS: 1 days | Discharge: HOME | DRG: 557 | End: 2024-04-20
Attending: STUDENT IN AN ORGANIZED HEALTH CARE EDUCATION/TRAINING PROGRAM | Admitting: INTERNAL MEDICINE
Payer: MEDICARE

## 2024-04-19 ENCOUNTER — APPOINTMENT (OUTPATIENT)
Dept: GENERAL RADIOLOGY | Facility: HOSPITAL | Age: 64
DRG: 557 | End: 2024-04-19
Payer: MEDICARE

## 2024-04-19 ENCOUNTER — APPOINTMENT (OUTPATIENT)
Dept: CT IMAGING | Facility: HOSPITAL | Age: 64
DRG: 557 | End: 2024-04-19
Payer: MEDICARE

## 2024-04-19 DIAGNOSIS — M71.121 SEPTIC BURSITIS OF ELBOW, RIGHT: Primary | ICD-10-CM

## 2024-04-19 DIAGNOSIS — L03.113 CELLULITIS OF RIGHT UPPER EXTREMITY: ICD-10-CM

## 2024-04-19 DIAGNOSIS — G82.20 PARAPLEGIA: ICD-10-CM

## 2024-04-19 PROBLEM — R33.9 URINARY RETENTION: Status: ACTIVE | Noted: 2024-04-19

## 2024-04-19 LAB
ALBUMIN SERPL-MCNC: 3.5 G/DL (ref 3.5–5.2)
ALBUMIN/GLOB SERPL: 1 G/DL
ALP SERPL-CCNC: 66 U/L (ref 39–117)
ALT SERPL W P-5'-P-CCNC: 9 U/L (ref 1–41)
ANION GAP SERPL CALCULATED.3IONS-SCNC: 11 MMOL/L (ref 5–15)
APPEARANCE FLD: ABNORMAL
AST SERPL-CCNC: 22 U/L (ref 1–40)
BACTERIA UR QL AUTO: ABNORMAL /HPF
BASOPHILS # BLD AUTO: 0.06 10*3/MM3 (ref 0–0.2)
BASOPHILS NFR BLD AUTO: 0.6 % (ref 0–1.5)
BILIRUB SERPL-MCNC: 0.4 MG/DL (ref 0–1.2)
BILIRUB UR QL STRIP: NEGATIVE
BUN SERPL-MCNC: 8 MG/DL (ref 8–23)
BUN/CREAT SERPL: 27.6 (ref 7–25)
CALCIUM SPEC-SCNC: 8.2 MG/DL (ref 8.6–10.5)
CHLORIDE SERPL-SCNC: 93 MMOL/L (ref 98–107)
CLARITY UR: CLEAR
CO2 SERPL-SCNC: 23 MMOL/L (ref 22–29)
COLOR FLD: YELLOW
COLOR UR: YELLOW
CREAT SERPL-MCNC: 0.29 MG/DL (ref 0.76–1.27)
CRP SERPL-MCNC: 16.17 MG/DL (ref 0–0.5)
CRYSTALS FLD MICRO: NORMAL
D-LACTATE SERPL-SCNC: 1.4 MMOL/L (ref 0.5–2)
DEPRECATED RDW RBC AUTO: 48.4 FL (ref 37–54)
EGFRCR SERPLBLD CKD-EPI 2021: 133.4 ML/MIN/1.73
EOSINOPHIL # BLD AUTO: 0.13 10*3/MM3 (ref 0–0.4)
EOSINOPHIL NFR BLD AUTO: 1.2 % (ref 0.3–6.2)
ERYTHROCYTE [DISTWIDTH] IN BLOOD BY AUTOMATED COUNT: 13.7 % (ref 12.3–15.4)
ERYTHROCYTE [SEDIMENTATION RATE] IN BLOOD: 77 MM/HR (ref 0–20)
FLUAV RNA RESP QL NAA+PROBE: NOT DETECTED
FLUBV RNA RESP QL NAA+PROBE: NOT DETECTED
GLOBULIN UR ELPH-MCNC: 3.4 GM/DL
GLUCOSE SERPL-MCNC: 127 MG/DL (ref 65–99)
GLUCOSE UR STRIP-MCNC: NEGATIVE MG/DL
HCT VFR BLD AUTO: 36.5 % (ref 37.5–51)
HGB BLD-MCNC: 12.1 G/DL (ref 13–17.7)
HGB UR QL STRIP.AUTO: NEGATIVE
HYALINE CASTS UR QL AUTO: ABNORMAL /LPF
IMM GRANULOCYTES # BLD AUTO: 0.05 10*3/MM3 (ref 0–0.05)
IMM GRANULOCYTES NFR BLD AUTO: 0.5 % (ref 0–0.5)
KETONES UR QL STRIP: ABNORMAL
LEUKOCYTE ESTERASE UR QL STRIP.AUTO: ABNORMAL
LYMPHOCYTES # BLD AUTO: 0.72 10*3/MM3 (ref 0.7–3.1)
LYMPHOCYTES NFR BLD AUTO: 6.8 % (ref 19.6–45.3)
LYMPHOCYTES NFR FLD MANUAL: 6 %
MAGNESIUM SERPL-MCNC: 1.8 MG/DL (ref 1.6–2.4)
MCH RBC QN AUTO: 31.5 PG (ref 26.6–33)
MCHC RBC AUTO-ENTMCNC: 33.2 G/DL (ref 31.5–35.7)
MCV RBC AUTO: 95.1 FL (ref 79–97)
MONOCYTES # BLD AUTO: 1.12 10*3/MM3 (ref 0.1–0.9)
MONOCYTES NFR BLD AUTO: 10.5 % (ref 5–12)
MONOCYTES NFR FLD: 9 %
NEUTROPHILS NFR BLD AUTO: 8.55 10*3/MM3 (ref 1.7–7)
NEUTROPHILS NFR BLD AUTO: 80.4 % (ref 42.7–76)
NEUTROPHILS NFR FLD MANUAL: 85 %
NITRITE UR QL STRIP: NEGATIVE
NRBC BLD AUTO-RTO: 0 /100 WBC (ref 0–0.2)
PH UR STRIP.AUTO: 6.5 [PH] (ref 5–8)
PHOSPHATE SERPL-MCNC: 2.7 MG/DL (ref 2.5–4.5)
PLATELET # BLD AUTO: 276 10*3/MM3 (ref 140–450)
PMV BLD AUTO: 8.8 FL (ref 6–12)
POTASSIUM SERPL-SCNC: 4.4 MMOL/L (ref 3.5–5.2)
PROCALCITONIN SERPL-MCNC: 0.06 NG/ML (ref 0–0.25)
PROT SERPL-MCNC: 6.9 G/DL (ref 6–8.5)
PROT UR QL STRIP: NEGATIVE
RBC # BLD AUTO: 3.84 10*6/MM3 (ref 4.14–5.8)
RBC # FLD AUTO: ABNORMAL /MM3
RBC # UR STRIP: ABNORMAL /HPF
REF LAB TEST METHOD: ABNORMAL
RSV RNA RESP QL NAA+PROBE: NOT DETECTED
SARS-COV-2 RNA RESP QL NAA+PROBE: NOT DETECTED
SODIUM SERPL-SCNC: 127 MMOL/L (ref 136–145)
SP GR UR STRIP: 1.03 (ref 1–1.03)
SQUAMOUS #/AREA URNS HPF: ABNORMAL /HPF
UROBILINOGEN UR QL STRIP: ABNORMAL
WBC # FLD AUTO: ABNORMAL /MM3
WBC # UR STRIP: ABNORMAL /HPF
WBC NRBC COR # BLD AUTO: 10.63 10*3/MM3 (ref 3.4–10.8)

## 2024-04-19 PROCEDURE — 25810000003 SODIUM CHLORIDE 0.9 % SOLUTION: Performed by: STUDENT IN AN ORGANIZED HEALTH CARE EDUCATION/TRAINING PROGRAM

## 2024-04-19 PROCEDURE — 85025 COMPLETE CBC W/AUTO DIFF WBC: CPT | Performed by: STUDENT IN AN ORGANIZED HEALTH CARE EDUCATION/TRAINING PROGRAM

## 2024-04-19 PROCEDURE — 99222 1ST HOSP IP/OBS MODERATE 55: CPT | Performed by: NURSE PRACTITIONER

## 2024-04-19 PROCEDURE — 87186 SC STD MICRODIL/AGAR DIL: CPT | Performed by: STUDENT IN AN ORGANIZED HEALTH CARE EDUCATION/TRAINING PROGRAM

## 2024-04-19 PROCEDURE — 36415 COLL VENOUS BLD VENIPUNCTURE: CPT

## 2024-04-19 PROCEDURE — G0378 HOSPITAL OBSERVATION PER HR: HCPCS

## 2024-04-19 PROCEDURE — 25010000002 CEFTRIAXONE PER 250 MG: Performed by: STUDENT IN AN ORGANIZED HEALTH CARE EDUCATION/TRAINING PROGRAM

## 2024-04-19 PROCEDURE — 25810000003 SODIUM CHLORIDE 0.9 % SOLUTION 1,000 ML FLEX CONT: Performed by: STUDENT IN AN ORGANIZED HEALTH CARE EDUCATION/TRAINING PROGRAM

## 2024-04-19 PROCEDURE — 87147 CULTURE TYPE IMMUNOLOGIC: CPT | Performed by: STUDENT IN AN ORGANIZED HEALTH CARE EDUCATION/TRAINING PROGRAM

## 2024-04-19 PROCEDURE — 93005 ELECTROCARDIOGRAM TRACING: CPT | Performed by: STUDENT IN AN ORGANIZED HEALTH CARE EDUCATION/TRAINING PROGRAM

## 2024-04-19 PROCEDURE — 99285 EMERGENCY DEPT VISIT HI MDM: CPT

## 2024-04-19 PROCEDURE — 89060 EXAM SYNOVIAL FLUID CRYSTALS: CPT | Performed by: STUDENT IN AN ORGANIZED HEALTH CARE EDUCATION/TRAINING PROGRAM

## 2024-04-19 PROCEDURE — 87637 SARSCOV2&INF A&B&RSV AMP PRB: CPT | Performed by: STUDENT IN AN ORGANIZED HEALTH CARE EDUCATION/TRAINING PROGRAM

## 2024-04-19 PROCEDURE — 80053 COMPREHEN METABOLIC PANEL: CPT | Performed by: STUDENT IN AN ORGANIZED HEALTH CARE EDUCATION/TRAINING PROGRAM

## 2024-04-19 PROCEDURE — 87070 CULTURE OTHR SPECIMN AEROBIC: CPT | Performed by: STUDENT IN AN ORGANIZED HEALTH CARE EDUCATION/TRAINING PROGRAM

## 2024-04-19 PROCEDURE — 84157 ASSAY OF PROTEIN OTHER: CPT | Performed by: STUDENT IN AN ORGANIZED HEALTH CARE EDUCATION/TRAINING PROGRAM

## 2024-04-19 PROCEDURE — 89051 BODY FLUID CELL COUNT: CPT | Performed by: STUDENT IN AN ORGANIZED HEALTH CARE EDUCATION/TRAINING PROGRAM

## 2024-04-19 PROCEDURE — 73201 CT UPPER EXTREMITY W/DYE: CPT

## 2024-04-19 PROCEDURE — 25510000001 IOPAMIDOL 61 % SOLUTION: Performed by: STUDENT IN AN ORGANIZED HEALTH CARE EDUCATION/TRAINING PROGRAM

## 2024-04-19 PROCEDURE — 25010000002 KETOROLAC TROMETHAMINE PER 15 MG: Performed by: STUDENT IN AN ORGANIZED HEALTH CARE EDUCATION/TRAINING PROGRAM

## 2024-04-19 PROCEDURE — 71045 X-RAY EXAM CHEST 1 VIEW: CPT

## 2024-04-19 PROCEDURE — 86140 C-REACTIVE PROTEIN: CPT | Performed by: STUDENT IN AN ORGANIZED HEALTH CARE EDUCATION/TRAINING PROGRAM

## 2024-04-19 PROCEDURE — 87015 SPECIMEN INFECT AGNT CONCNTJ: CPT | Performed by: STUDENT IN AN ORGANIZED HEALTH CARE EDUCATION/TRAINING PROGRAM

## 2024-04-19 PROCEDURE — 87040 BLOOD CULTURE FOR BACTERIA: CPT | Performed by: STUDENT IN AN ORGANIZED HEALTH CARE EDUCATION/TRAINING PROGRAM

## 2024-04-19 PROCEDURE — 25010000002 VANCOMYCIN HCL IN NACL 1.5-0.9 GM/500ML-% SOLUTION: Performed by: STUDENT IN AN ORGANIZED HEALTH CARE EDUCATION/TRAINING PROGRAM

## 2024-04-19 PROCEDURE — 87205 SMEAR GRAM STAIN: CPT | Performed by: STUDENT IN AN ORGANIZED HEALTH CARE EDUCATION/TRAINING PROGRAM

## 2024-04-19 PROCEDURE — 81001 URINALYSIS AUTO W/SCOPE: CPT | Performed by: STUDENT IN AN ORGANIZED HEALTH CARE EDUCATION/TRAINING PROGRAM

## 2024-04-19 PROCEDURE — 83735 ASSAY OF MAGNESIUM: CPT | Performed by: STUDENT IN AN ORGANIZED HEALTH CARE EDUCATION/TRAINING PROGRAM

## 2024-04-19 PROCEDURE — 83605 ASSAY OF LACTIC ACID: CPT | Performed by: STUDENT IN AN ORGANIZED HEALTH CARE EDUCATION/TRAINING PROGRAM

## 2024-04-19 PROCEDURE — 84145 PROCALCITONIN (PCT): CPT | Performed by: STUDENT IN AN ORGANIZED HEALTH CARE EDUCATION/TRAINING PROGRAM

## 2024-04-19 PROCEDURE — 84100 ASSAY OF PHOSPHORUS: CPT | Performed by: STUDENT IN AN ORGANIZED HEALTH CARE EDUCATION/TRAINING PROGRAM

## 2024-04-19 PROCEDURE — 85652 RBC SED RATE AUTOMATED: CPT | Performed by: STUDENT IN AN ORGANIZED HEALTH CARE EDUCATION/TRAINING PROGRAM

## 2024-04-19 PROCEDURE — 82945 GLUCOSE OTHER FLUID: CPT | Performed by: STUDENT IN AN ORGANIZED HEALTH CARE EDUCATION/TRAINING PROGRAM

## 2024-04-19 RX ORDER — POLYETHYLENE GLYCOL 3350 17 G/17G
17 POWDER, FOR SOLUTION ORAL DAILY PRN
Status: DISCONTINUED | OUTPATIENT
Start: 2024-04-19 | End: 2024-04-20 | Stop reason: HOSPADM

## 2024-04-19 RX ORDER — VANCOMYCIN/0.9 % SOD CHLORIDE 1.5G/250ML
20 PLASTIC BAG, INJECTION (ML) INTRAVENOUS ONCE
Qty: 500 ML | Refills: 0 | Status: DISCONTINUED | OUTPATIENT
Start: 2024-04-19 | End: 2024-04-19

## 2024-04-19 RX ORDER — AMOXICILLIN 250 MG
2 CAPSULE ORAL 2 TIMES DAILY PRN
Status: DISCONTINUED | OUTPATIENT
Start: 2024-04-19 | End: 2024-04-20 | Stop reason: HOSPADM

## 2024-04-19 RX ORDER — KETOROLAC TROMETHAMINE 15 MG/ML
15 INJECTION, SOLUTION INTRAMUSCULAR; INTRAVENOUS ONCE
Status: COMPLETED | OUTPATIENT
Start: 2024-04-19 | End: 2024-04-19

## 2024-04-19 RX ORDER — ACETAMINOPHEN 160 MG/5ML
650 SOLUTION ORAL EVERY 4 HOURS PRN
Status: DISCONTINUED | OUTPATIENT
Start: 2024-04-19 | End: 2024-04-20 | Stop reason: HOSPADM

## 2024-04-19 RX ORDER — ACETAMINOPHEN 650 MG/1
650 SUPPOSITORY RECTAL EVERY 4 HOURS PRN
Status: DISCONTINUED | OUTPATIENT
Start: 2024-04-19 | End: 2024-04-20 | Stop reason: HOSPADM

## 2024-04-19 RX ORDER — SODIUM CHLORIDE 9 MG/ML
40 INJECTION, SOLUTION INTRAVENOUS AS NEEDED
Status: DISCONTINUED | OUTPATIENT
Start: 2024-04-19 | End: 2024-04-20 | Stop reason: HOSPADM

## 2024-04-19 RX ORDER — SODIUM CHLORIDE 0.9 % (FLUSH) 0.9 %
10 SYRINGE (ML) INJECTION EVERY 12 HOURS SCHEDULED
Status: DISCONTINUED | OUTPATIENT
Start: 2024-04-19 | End: 2024-04-20 | Stop reason: HOSPADM

## 2024-04-19 RX ORDER — SODIUM CHLORIDE 0.9 % (FLUSH) 0.9 %
10 SYRINGE (ML) INJECTION AS NEEDED
Status: DISCONTINUED | OUTPATIENT
Start: 2024-04-19 | End: 2024-04-20 | Stop reason: HOSPADM

## 2024-04-19 RX ORDER — ACETAMINOPHEN 325 MG/1
650 TABLET ORAL EVERY 4 HOURS PRN
Status: DISCONTINUED | OUTPATIENT
Start: 2024-04-19 | End: 2024-04-20 | Stop reason: HOSPADM

## 2024-04-19 RX ORDER — LIDOCAINE HYDROCHLORIDE 10 MG/ML
10 INJECTION, SOLUTION EPIDURAL; INFILTRATION; INTRACAUDAL; PERINEURAL ONCE
Status: COMPLETED | OUTPATIENT
Start: 2024-04-19 | End: 2024-04-19

## 2024-04-19 RX ORDER — BISACODYL 10 MG
10 SUPPOSITORY, RECTAL RECTAL DAILY PRN
Status: DISCONTINUED | OUTPATIENT
Start: 2024-04-19 | End: 2024-04-20 | Stop reason: HOSPADM

## 2024-04-19 RX ORDER — OXYBUTYNIN CHLORIDE 5 MG/1
5 TABLET ORAL 3 TIMES DAILY
COMMUNITY

## 2024-04-19 RX ORDER — VANCOMYCIN/0.9 % SOD CHLORIDE 1.5G/250ML
20 PLASTIC BAG, INJECTION (ML) INTRAVENOUS ONCE
Qty: 500 ML | Refills: 0 | Status: COMPLETED | OUTPATIENT
Start: 2024-04-19 | End: 2024-04-20

## 2024-04-19 RX ORDER — OXYBUTYNIN CHLORIDE 5 MG/1
5 TABLET ORAL 3 TIMES DAILY
Status: DISCONTINUED | OUTPATIENT
Start: 2024-04-19 | End: 2024-04-20 | Stop reason: HOSPADM

## 2024-04-19 RX ORDER — BISACODYL 5 MG/1
5 TABLET, DELAYED RELEASE ORAL DAILY PRN
Status: DISCONTINUED | OUTPATIENT
Start: 2024-04-19 | End: 2024-04-20 | Stop reason: HOSPADM

## 2024-04-19 RX ADMIN — CEFTRIAXONE 2000 MG: 2 INJECTION, POWDER, FOR SOLUTION INTRAMUSCULAR; INTRAVENOUS at 17:58

## 2024-04-19 RX ADMIN — LIDOCAINE HYDROCHLORIDE 10 ML: 10 INJECTION, SOLUTION EPIDURAL; INFILTRATION; INTRACAUDAL; PERINEURAL at 18:15

## 2024-04-19 RX ADMIN — IOPAMIDOL 85 ML: 612 INJECTION, SOLUTION INTRAVENOUS at 18:30

## 2024-04-19 RX ADMIN — KETOROLAC TROMETHAMINE 15 MG: 15 INJECTION, SOLUTION INTRAMUSCULAR; INTRAVENOUS at 22:39

## 2024-04-19 RX ADMIN — Medication 1500 MG: at 20:44

## 2024-04-19 RX ADMIN — Medication 10 ML: at 21:17

## 2024-04-19 RX ADMIN — ACETAMINOPHEN 650 MG: 325 TABLET ORAL at 21:17

## 2024-04-19 RX ADMIN — SODIUM CHLORIDE 1000 ML: 9 INJECTION, SOLUTION INTRAVENOUS at 17:59
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
## 2024-04-20 ENCOUNTER — READMISSION MANAGEMENT (OUTPATIENT)
Dept: CALL CENTER | Facility: HOSPITAL | Age: 64
End: 2024-04-20
Payer: MEDICARE

## 2024-04-20 VITALS
DIASTOLIC BLOOD PRESSURE: 77 MMHG | WEIGHT: 175 LBS | RESPIRATION RATE: 18 BRPM | OXYGEN SATURATION: 99 % | HEIGHT: 71 IN | SYSTOLIC BLOOD PRESSURE: 118 MMHG | BODY MASS INDEX: 24.5 KG/M2 | HEART RATE: 74 BPM | TEMPERATURE: 98.2 F

## 2024-04-20 LAB
ANION GAP SERPL CALCULATED.3IONS-SCNC: 10 MMOL/L (ref 5–15)
ANION GAP SERPL CALCULATED.3IONS-SCNC: 11 MMOL/L (ref 5–15)
ANION GAP SERPL CALCULATED.3IONS-SCNC: 9 MMOL/L (ref 5–15)
BASOPHILS # BLD AUTO: 0.05 10*3/MM3 (ref 0–0.2)
BASOPHILS NFR BLD AUTO: 0.5 % (ref 0–1.5)
BUN SERPL-MCNC: 10 MG/DL (ref 8–23)
BUN SERPL-MCNC: 10 MG/DL (ref 8–23)
BUN SERPL-MCNC: 9 MG/DL (ref 8–23)
BUN/CREAT SERPL: 28.1 (ref 7–25)
BUN/CREAT SERPL: 29.4 (ref 7–25)
BUN/CREAT SERPL: 35.7 (ref 7–25)
CALCIUM SPEC-SCNC: 8 MG/DL (ref 8.6–10.5)
CALCIUM SPEC-SCNC: 8.1 MG/DL (ref 8.6–10.5)
CALCIUM SPEC-SCNC: 8.4 MG/DL (ref 8.6–10.5)
CHLORIDE SERPL-SCNC: 92 MMOL/L (ref 98–107)
CHLORIDE SERPL-SCNC: 95 MMOL/L (ref 98–107)
CHLORIDE SERPL-SCNC: 95 MMOL/L (ref 98–107)
CK SERPL-CCNC: 86 U/L (ref 20–200)
CO2 SERPL-SCNC: 23 MMOL/L (ref 22–29)
CO2 SERPL-SCNC: 23 MMOL/L (ref 22–29)
CO2 SERPL-SCNC: 24 MMOL/L (ref 22–29)
CREAT SERPL-MCNC: 0.28 MG/DL (ref 0.76–1.27)
CREAT SERPL-MCNC: 0.32 MG/DL (ref 0.76–1.27)
CREAT SERPL-MCNC: 0.34 MG/DL (ref 0.76–1.27)
CRP SERPL-MCNC: 13.67 MG/DL (ref 0–0.5)
DEPRECATED RDW RBC AUTO: 48 FL (ref 37–54)
EGFRCR SERPLBLD CKD-EPI 2021: 127.2 ML/MIN/1.73
EGFRCR SERPLBLD CKD-EPI 2021: 129.5 ML/MIN/1.73
EGFRCR SERPLBLD CKD-EPI 2021: 134.9 ML/MIN/1.73
EOSINOPHIL # BLD AUTO: 0.14 10*3/MM3 (ref 0–0.4)
EOSINOPHIL NFR BLD AUTO: 1.4 % (ref 0.3–6.2)
ERYTHROCYTE [DISTWIDTH] IN BLOOD BY AUTOMATED COUNT: 13.8 % (ref 12.3–15.4)
ERYTHROCYTE [SEDIMENTATION RATE] IN BLOOD: 53 MM/HR (ref 0–20)
GLUCOSE SERPL-MCNC: 139 MG/DL (ref 65–99)
GLUCOSE SERPL-MCNC: 91 MG/DL (ref 65–99)
GLUCOSE SERPL-MCNC: 94 MG/DL (ref 65–99)
HCT VFR BLD AUTO: 33.5 % (ref 37.5–51)
HGB BLD-MCNC: 11.3 G/DL (ref 13–17.7)
IMM GRANULOCYTES # BLD AUTO: 0.04 10*3/MM3 (ref 0–0.05)
IMM GRANULOCYTES NFR BLD AUTO: 0.4 % (ref 0–0.5)
LYMPHOCYTES # BLD AUTO: 0.95 10*3/MM3 (ref 0.7–3.1)
LYMPHOCYTES NFR BLD AUTO: 9.2 % (ref 19.6–45.3)
MAGNESIUM SERPL-MCNC: 1.9 MG/DL (ref 1.6–2.4)
MCH RBC QN AUTO: 31.9 PG (ref 26.6–33)
MCHC RBC AUTO-ENTMCNC: 33.7 G/DL (ref 31.5–35.7)
MCV RBC AUTO: 94.6 FL (ref 79–97)
MONOCYTES # BLD AUTO: 1.32 10*3/MM3 (ref 0.1–0.9)
MONOCYTES NFR BLD AUTO: 12.7 % (ref 5–12)
NEUTROPHILS NFR BLD AUTO: 7.86 10*3/MM3 (ref 1.7–7)
NEUTROPHILS NFR BLD AUTO: 75.8 % (ref 42.7–76)
NRBC BLD AUTO-RTO: 0 /100 WBC (ref 0–0.2)
PLATELET # BLD AUTO: 279 10*3/MM3 (ref 140–450)
PMV BLD AUTO: 8.8 FL (ref 6–12)
POTASSIUM SERPL-SCNC: 4 MMOL/L (ref 3.5–5.2)
POTASSIUM SERPL-SCNC: 4.4 MMOL/L (ref 3.5–5.2)
POTASSIUM SERPL-SCNC: 4.6 MMOL/L (ref 3.5–5.2)
QT INTERVAL: 362 MS
QTC INTERVAL: 445 MS
RBC # BLD AUTO: 3.54 10*6/MM3 (ref 4.14–5.8)
SODIUM SERPL-SCNC: 126 MMOL/L (ref 136–145)
SODIUM SERPL-SCNC: 128 MMOL/L (ref 136–145)
SODIUM SERPL-SCNC: 128 MMOL/L (ref 136–145)
WBC NRBC COR # BLD AUTO: 10.36 10*3/MM3 (ref 3.4–10.8)

## 2024-04-20 PROCEDURE — 80048 BASIC METABOLIC PNL TOTAL CA: CPT | Performed by: NURSE PRACTITIONER

## 2024-04-20 PROCEDURE — 85025 COMPLETE CBC W/AUTO DIFF WBC: CPT | Performed by: NURSE PRACTITIONER

## 2024-04-20 PROCEDURE — 86140 C-REACTIVE PROTEIN: CPT | Performed by: NURSE PRACTITIONER

## 2024-04-20 PROCEDURE — 82550 ASSAY OF CK (CPK): CPT | Performed by: INTERNAL MEDICINE

## 2024-04-20 PROCEDURE — 25010000002 DAPTOMYCIN PER 1 MG: Performed by: INTERNAL MEDICINE

## 2024-04-20 PROCEDURE — 83735 ASSAY OF MAGNESIUM: CPT | Performed by: NURSE PRACTITIONER

## 2024-04-20 PROCEDURE — 99238 HOSP IP/OBS DSCHRG MGMT 30/<: CPT | Performed by: INTERNAL MEDICINE

## 2024-04-20 PROCEDURE — 25010000002 VANCOMYCIN 1 G RECONSTITUTED SOLUTION 1 EACH VIAL

## 2024-04-20 PROCEDURE — 87040 BLOOD CULTURE FOR BACTERIA: CPT | Performed by: STUDENT IN AN ORGANIZED HEALTH CARE EDUCATION/TRAINING PROGRAM

## 2024-04-20 PROCEDURE — 85652 RBC SED RATE AUTOMATED: CPT | Performed by: NURSE PRACTITIONER

## 2024-04-20 PROCEDURE — 25810000003 SODIUM CHLORIDE 0.9 % SOLUTION 250 ML FLEX CONT

## 2024-04-20 RX ORDER — L.ACID,PARA/B.BIFIDUM/S.THERM 8B CELL
1 CAPSULE ORAL 2 TIMES DAILY
Status: DISCONTINUED | OUTPATIENT
Start: 2024-04-20 | End: 2024-04-20 | Stop reason: HOSPADM

## 2024-04-20 RX ADMIN — DAPTOMYCIN 500 MG: 500 INJECTION, POWDER, LYOPHILIZED, FOR SOLUTION INTRAVENOUS at 13:36

## 2024-04-20 RX ADMIN — ACETAMINOPHEN 650 MG: 325 TABLET ORAL at 06:39

## 2024-04-20 RX ADMIN — Medication 1 CAPSULE: at 13:36

## 2024-04-20 RX ADMIN — Medication 10 ML: at 08:59

## 2024-04-20 RX ADMIN — VANCOMYCIN HYDROCHLORIDE 1000 MG: 1 INJECTION, POWDER, LYOPHILIZED, FOR SOLUTION INTRAVENOUS at 08:58
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
## 2024-04-20 NOTE — PLAN OF CARE
Goal Outcome Evaluation:  Plan of Care Reviewed With: patient        Progress: improving  Outcome Evaluation: VSS. RA. No acute events on shift. Will continue plan of care.
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
## 2024-04-20 NOTE — CASE MANAGEMENT/SOCIAL WORK
Continued Stay Note   Lois     Patient Name: Baudilio Toledo  MRN: [id number]  Today's Date: 4/20/2024    Admit Date: 4/19/2024    Plan: Home   Discharge Plan       Row Name 04/20/24 1300       Plan    Plan Home    Patient/Family in Agreement with Plan yes    Plan Comments Patient's plan is home at discharge.  Per CM consult, patient is to go to [facility] office daily for IV Daptomycin starting tomorrow, 4/21 at 9 am.  Note faxed to their office at [phone].  He has a follow up scheduled with Dr. Szymanski on 4/22 at noon.  Both appointments are in AVS.    Final Discharge Disposition Code 01 - home or self-care      Row Name 04/20/24 130       Plan    Final Discharge Disposition Code 01 - home or self-care                   Discharge Codes    No documentation.                 Expected Discharge Date and Time       Expected Discharge Date Expected Discharge Time    Apr 20, 2024               Gilda Nelson RN
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
## 2024-04-20 NOTE — PROGRESS NOTES
"Pharmacy Consult-Vancomycin Dosing  Baudilio Toledo is a  63 y.o. male receiving vancomycin therapy.     Indication: SSTI  Consulting Provider: Nuris Swift  ID Consult: Yes    Goal AUC: 400 - 600 mg/L*hr    Current Antimicrobial Therapy  Anti-Infectives (From admission, onward)      Ordered     Dose/Rate Route Frequency Start Stop    04/19/24 2026  cefTRIAXone (ROCEPHIN) 2,000 mg in sodium chloride 0.9 % 100 mL MBP        Ordering Provider: Nuris Swift APRN    2,000 mg  200 mL/hr over 30 Minutes Intravenous Every 24 Hours 04/20/24 1800 04/25/24 1759    04/19/24 2108  vancomycin (VANCOCIN) 1,000 mg in sodium chloride 0.9 % 250 mL IVPB-VTB        Ordering Provider: Davy Roberts RPH    1,000 mg  250 mL/hr over 60 Minutes Intravenous Every 12 Hours 04/20/24 0900 04/26/24 0859    04/19/24 2026  Pharmacy to dose vancomycin        Ordering Provider: Nuris Swift APRN     Does not apply Continuous PRN 04/19/24 2026 04/26/24 2025 04/19/24 1730  cefTRIAXone (ROCEPHIN) 2,000 mg in sodium chloride 0.9 % 100 mL MBP        Ordering Provider: Long Vicente MD    2,000 mg  200 mL/hr over 30 Minutes Intravenous Once 04/19/24 1830 04/19/24 1828    04/19/24 1730  vancomycin IVPB 1500 mg in 0.9% NaCl (Premix) 500 mL        Ordering Provider: Long Vicente MD    20 mg/kg × 79.4 kg  333.3 mL/hr over 90 Minutes Intravenous Once 04/19/24 1830              Allergies  Allergies as of 04/19/2024 - Reviewed 04/19/2024   Allergen Reaction Noted    Sulfa antibiotics Rash 05/31/2016       Labs    Results from last 7 days   Lab Units 04/19/24  1649   BUN mg/dL 8   CREATININE mg/dL 0.29*       Results from last 7 days   Lab Units 04/19/24  1649   WBC 10*3/mm3 10.63       Evaluation of Dosing     Last Dose Received in the ED/Outside Facility: 4/19 ~2100  Is Patient on Dialysis or Renal Replacement: No    Ht - 180.3 cm (71\")  Wt - 79.4 kg (175 lb)    Estimated Creatinine Clearance: 285.2 mL/min (A) (by C-G " formula based on SCr of 0.29 mg/dL (L)).    No intake/output data recorded.    Microbiology and Radiology  Microbiology Results (last 10 days)       Procedure Component Value - Date/Time    COVID PRE-OP / PRE-PROCEDURE SCREENING ORDER (NO ISOLATION) - Swab, Nasopharynx [[id number]]  (Normal) Collected: 04/19/24 1858    Lab Status: Final result Specimen: Swab from Nasopharynx Updated: 04/19/24 2001    Narrative:      The following orders were created for panel order COVID PRE-OP / PRE-PROCEDURE SCREENING ORDER (NO ISOLATION) - Swab, Nasopharynx.  Procedure                               Abnormality         Status                     ---------                               -----------         ------                     COVID-19, FLU A/B, RSV P...[[id number]]  Normal              Final result                 Please view results for these tests on the individual orders.    COVID-19, FLU A/B, RSV PCR 1 HR TAT - Swab, Nasopharynx [[id number]]  (Normal) Collected: 04/19/24 1858    Lab Status: Final result Specimen: Swab from Nasopharynx Updated: 04/19/24 2001     COVID19 Not Detected     Influenza A PCR Not Detected     Influenza B PCR Not Detected     RSV, PCR Not Detected    Narrative:      Fact sheet for providers: https://www.fda.gov/media/783440/download    Fact sheet for patients: https://www.fda.gov/media/491071/download    Test performed by PCR.    Body Fluid Culture - Synovial Fluid, Elbow, Right [[id number]] Collected: 04/19/24 1816    Lab Status: Preliminary result Specimen: Synovial Fluid from Elbow, Right Updated: 04/19/24 2048     Gram Stain Many (4+) WBCs per low power field      No organisms seen            Reported Vancomycin Levels                         Insight[employer] AUC Calculation:    Current AUC:   -- mg/L*hr    Predicted Steady State AUC on Current Dose: -- mg/L*hr  _________________________________    Predicted Steady State AUC on New Dose:   462 mg/L*hr    Assessment/Plan:   Pharmacy to dose vancomycin for  SSTI. Goal -600 mg/L*hr.  Of note, patient is quadriplegic and SCr not representative of true renal clearance.  Patient received a loading dose of vancomycin 1500mg (~19mg/kg) IV on 4/19 @ 2044. Will initiate a maintenance dose of vancomycin 1000mg (~12mg/kg) IV Q12hr on 4/20 @ 0900.  MRSA swab pending. Assess clearance by vancomycin level on 4/21 @ 0600, prior to 4th dose.  Pharmacy will continue to monitor renal function, cultures and sensitivities, and clinical status to adjust regimen as necessary.    Davy Roberts, PharmD  Pharmacy Resident  4/19/2024   21:09 EDT

## 2024-04-20 NOTE — DISCHARGE SUMMARY
[facility] Medicine Services  DISCHARGE SUMMARY    Patient Name: Baudilio Toledo  : 1960  MRN: [id number]    Date of Admission: 2024  4:19 PM  Date of Discharge: 2024  Primary Care Physician: Kevin Garcia DO    Consults       Date and Time Order Name Status Description    2024  8:26 PM Inpatient Orthopedic Surgery Consult Completed     2024  8:26 PM Inpatient Infectious Diseases Consult Completed             Hospital Course     Presenting Problem:     Active Hospital Problems    Diagnosis  POA    **Cellulitis [L03.90]  Yes    Urinary retention [R33.9]  Yes    Hyponatremia [E87.1]  Yes    Paraplegia [G82.20]  Yes      Resolved Hospital Problems   No resolved problems to display.        Hospital Course:  Baudilio Toledo is a 63 y.o. male with PMH significant for quadriplegia (secondary to spinal cord injury), neurogenic bladder, who presents to the ED with complaint of right elbow cellulitis.  Seen by orthopedic surgery service recommended conservative treatment with antibiotics.  Seen by ID and discharged on IV daptomycin with [facility]    Cellulitis/bursitis of right elbow  Initially started on Rocephin and vancomycin.  Later switched to daptomycin by ID.  Dr. Maico Kearns/ID recommended to discharge the patient home and he will be getting IV daptomycin through a peripheral IV with [facility]  Patient follows with Dr. Szymanski as outpatient  Orthopedic surgery service recommended conservative treatment with antibiotics.     Hyponatremia, of no clinical significance  3 has chronic mild hyponatremia, slightly worse than baseline     Neurogenic bladder/chronic urinary retention  Quadriplegia  Self cath        Discharge Follow Up Recommendations for outpatient labs/diagnostics:  IV antibiotics with [facility]    Day of Discharge     HPI:   Patient seen and examined this morning.  He requested to be discharged home with IV antibiotics.  Declined any surgical  intervention    Vital Signs:   Temp:  [96.9 °F (36.1 °C)-100.1 °F (37.8 °C)] 98.2 °F (36.8 °C)  Heart Rate:  [68-80] 74  Resp:  [16-20] 18  BP: ()/(54-85) 118/77      Physical Exam:  General: Comfortable, not in distress, conversant and cooperative  Head: Atraumatic and normocephalic  Eyes: No Icterus. No pallor  Ears:  Ears appear intact with no abnormalities noted  Throat: No oral lesions, no thrush  Neck: Supple, trachea midline  Lungs: Clear to auscultation bilaterally, equal air entry, no wheezing or crackles  Heart:  Normal S1 and S2, no murmur, no gallop, No JVD, no lower extremity swelling  Abdomen:  Soft, no tenderness, no organomegaly, normal bowel sounds, no organomegaly  Extremities:  right elbow is swollen.  No erythema.  There is open skin lesion that is 0.5 inch in diameter consistent with ruptured bullae.  Limited range of movement of the right elbow because of pain.  Skin: No bleeding, bruising or rash, normal skin turgor and elasticity  Psych: Alert and oriented x 3, normal mood    Pertinent  and/or Most Recent Results     LAB RESULTS:      Lab 04/20/24  0037 04/19/24  1649   WBC 10.36 10.63   HEMOGLOBIN 11.3* 12.1*   HEMATOCRIT 33.5* 36.5*   PLATELETS 279 276   NEUTROS ABS 7.86* 8.55*   IMMATURE GRANS (ABS) 0.04 0.05   LYMPHS ABS 0.95 0.72   MONOS ABS 1.32* 1.12*   EOS ABS 0.14 0.13   MCV 94.6 95.1   SED RATE 53* 77*   CRP 13.67* 16.17*   PROCALCITONIN  --  0.06   LACTATE  --  1.4         Lab 04/20/24  0612 04/20/24  0037 04/19/24  1649   SODIUM 128* 128* 127*   POTASSIUM 4.4 4.0 4.4   CHLORIDE 95* 95* 93*   CO2 24.0 23.0 23.0   ANION GAP 9.0 10.0 11.0   BUN 10 9 8   CREATININE 0.28* 0.32* 0.29*   EGFR 134.9 129.5 133.4   GLUCOSE 94 139* 127*   CALCIUM 8.1* 8.0* 8.2*   MAGNESIUM  --  1.9 1.8   PHOSPHORUS  --   --  2.7         Lab 04/19/24  1649   TOTAL PROTEIN 6.9   ALBUMIN 3.5   GLOBULIN 3.4   ALT (SGPT) 9   AST (SGOT) 22   BILIRUBIN 0.4   ALK PHOS 66                     Brief Urine Lab  Results  (Last result in the past 365 days)        Color   Clarity   Blood   Leuk Est   Nitrite   Protein   CREAT   Urine HCG        04/19/24 1732 Yellow   Clear   Negative   Trace   Negative   Negative                 Microbiology Results (last 10 days)       Procedure Component Value - Date/Time    COVID PRE-OP / PRE-PROCEDURE SCREENING ORDER (NO ISOLATION) - Swab, Nasopharynx [[id number]]  (Normal) Collected: 04/19/24 1858    Lab Status: Final result Specimen: Swab from Nasopharynx Updated: 04/19/24 2001    Narrative:      The following orders were created for panel order COVID PRE-OP / PRE-PROCEDURE SCREENING ORDER (NO ISOLATION) - Swab, Nasopharynx.  Procedure                               Abnormality         Status                     ---------                               -----------         ------                     COVID-19, FLU A/B, RSV P...[[id number]]  Normal              Final result                 Please view results for these tests on the individual orders.    COVID-19, FLU A/B, RSV PCR 1 HR TAT - Swab, Nasopharynx [[id number]]  (Normal) Collected: 04/19/24 1858    Lab Status: Final result Specimen: Swab from Nasopharynx Updated: 04/19/24 2001     COVID19 Not Detected     Influenza A PCR Not Detected     Influenza B PCR Not Detected     RSV, PCR Not Detected    Narrative:      Fact sheet for providers: https://www.fda.gov/media/867420/download    Fact sheet for patients: https://www.fda.gov/media/639796/download    Test performed by PCR.    Body Fluid Culture - Synovial Fluid, Elbow, Right [[id number]] Collected: 04/19/24 1816    Lab Status: Preliminary result Specimen: Synovial Fluid from Elbow, Right Updated: 04/20/24 0936     Body Fluid Culture No growth     Gram Stain Many (4+) WBCs per low power field      No organisms seen            CT Upper Extremity Right With Contrast    Result Date: 4/19/2024  CT UPPER EXTREMITY RIGHT W CONTRAST Date of Exam: 4/19/2024 6:14 PM EDT Indication: fever, swelling  redness over elbow, unclear if joint or bursa. Comparison: None available. Technique: Axial CT images were obtained of the right upper extremity after the uneventful intravenous administration of 85 cc Isovue-300 .  Reconstructed coronal and sagittal images were also obtained. Automated exposure control and iterative construction methods were used. Findings: The distal humerus, proximal radius and ulna are intact. Severe extensive soft tissue swelling. Distention of the olecranon bursa without surrounding enhancement of the bursal sac. Fluid within the bursa measures up to 2.3 cm x 1.9 cm. No elbow  joint effusion. No evidence of abscess.     Olecranon bursitis with severe soft tissue swelling consistent with cellulitis. No evidence of abscess. Electronically Signed: Kirt Sweet MD  4/19/2024 6:47 PM EDT  Workstation ID: [id number]    XR Chest 1 View    Result Date: 4/19/2024  XR CHEST 1 VW Date of Exam: 4/19/2024 4:55 PM EDT Indication: fever infectious workup Comparison: 5/6/2017 Findings: Cervical spine fusion hardware is noted. There is a marked cervical thoracic and lumbar scoliosis convex to the right. Heart shadow now appears enlarged. Pulm vasculature is cephalized but there is only a suggestion of mild interstitial edema. There is probably a small right effusion or perhaps discoid atelectasis along the right lateral chest wall. There are multiple right-sided rib fractures involving the fourth, fifth and sixth ribs. There appears to be a combination of acute and old rib fractures at this site. In particular, there appear to be at least acute fractures of the left fifth and sixth ribs. Skinfold shadows are superimposed on the left chest and allowing for this, no pneumothorax is suspected.     Impression: 1. Interval development of cardiomegaly and mild pulmonary vascular congestion, since 2017 exam. 2. Marked thoracolumbar scoliosis. 3. Small new area of right basilar discoid atelectasis or minimal effusion.  4. Combination of what appear to be old and acute right lateral rib fractures, with suspected acute rib fractures of at least the right lateral 5th and 6th ribs. Skinfold shadow superimposed on the left chest as noted. Electronically Signed: Harjinder Sidhu MD  4/19/2024 5:16 PM EDT  Workstation ID: [id number]                 Plan for Follow-up of Pending Labs/Results:   Pending Labs       Order Current Status    Blood Culture - Blood, Hand, Left In process    Blood Culture - Blood, Hand, Right In process    Glucose, Body Fluid - Synovial Fluid, Elbow, Right In process    Protein, Body Fluid - Synovial Fluid, Elbow, Right In process    Body Fluid Culture - Synovial Fluid, Elbow, Right Preliminary result          Discharge Details        Discharge Medications        New Medications        Instructions Start Date   DAPTOmycin 500 mg in sodium chloride 0.9 % 50 mL   500 mg, Intravenous, Every 24 Hours             Changes to Medications        Instructions Start Date   acetaminophen 325 MG tablet  Commonly known as: TYLENOL  What changed:   reasons to take this  additional instructions   650 mg, Oral, Every 4 Hours PRN             Continue These Medications        Instructions Start Date   Centrum Silver tablet  Generic drug: multivitamin with minerals   1 tablet, Oral, Daily, OTC      loratadine 10 MG tablet  Commonly known as: CLARITIN   1 tablet, Oral, Daily PRN, OTC      oxybutynin 5 MG tablet  Commonly known as: DITROPAN   5 mg, Oral, 3 Times Daily      vitamin B-12 1000 MCG tablet  Commonly known as: CYANOCOBALAMIN   1,000 mcg, Oral, Daily, OTC      vitamin C 500 MG tablet  Commonly known as: ASCORBIC ACID   500 mg, Oral, 4 Times Daily, OTC               Allergies   Allergen Reactions    Sulfa Antibiotics Rash         Discharge Disposition:  Home or Self Care    Diet:  Hospital:  Diet Order   Procedures    Diet: Regular/House; Fluid Consistency: Thin (IDDSI 0)            Activity:  As tolerated    Restrictions or Other  Recommendations:  None        CODE STATUS:    Code Status and Medical Interventions:   Ordered at: 04/19/24 2011     Code Status (Patient has no pulse and is not breathing):    CPR (Attempt to Resuscitate)     Medical Interventions (Patient has pulse or is breathing):    Full Support       Future Appointments   Date Time Provider Department Center   9/13/2024 10:30 AM Kevin Garcai DO [facility]                 Natalie Colin MD  04/20/24      Time Spent on Discharge:  I spent  25  minutes on this discharge activity which included: face-to-face encounter with the patient, reviewing the data in the system, coordination of the care with the nursing staff as well as consultants, documentation, and entering orders.

## 2024-04-21 NOTE — OUTREACH NOTE
Prep Survey      Flowsheet Row Responses   [facility] facility patient discharged from? [facility]   Is LACE score < 7 ? Yes   Eligibility [facility]   Date of Admission 04/19/24   Date of Discharge 04/20/24   Discharge Disposition Home or Self Care   Discharge diagnosis Cellulitis   Does the patient have one of the following disease processes/diagnoses(primary or secondary)? Other   Does the patient have Home health ordered? No   Is there a DME ordered? No   Comments regarding appointments [facility] for IV daptomycin   Medication alerts for this patient IV abx   Prep survey completed? Yes            TIFFANY SHARIF - Registered Nurse

## 2024-04-22 ENCOUNTER — TRANSITIONAL CARE MANAGEMENT TELEPHONE ENCOUNTER (OUTPATIENT)
Dept: CALL CENTER | Facility: HOSPITAL | Age: 64
End: 2024-04-22
Payer: MEDICARE

## 2024-04-22 LAB
BACTERIA FLD CULT: ABNORMAL
GRAM STN SPEC: ABNORMAL
GRAM STN SPEC: ABNORMAL

## 2024-04-22 NOTE — OUTREACH NOTE
Call Center TCM Note      Flowsheet Row Responses   [facility] patient discharged from? [place]   Does the patient have one of the following disease processes/diagnoses(primary or secondary)? Other   TCM attempt successful? Yes   Call start time 1215   Call end time 1217   Discharge diagnosis Cellulitis   Person spoke with today (if not patient) and relationship Jennifer-Friend   Medication alerts for this patient IV ABX   Meds reviewed with patient/caregiver? Yes   Is the patient having any side effects they believe may be caused by any medication additions or changes? No   Does the patient have all medications ordered at discharge? Yes   Is the patient taking all medications as directed (includes completed medication regime)? Yes   Does the patient have an appointment with their PCP within 7-14 days of discharge? No   Nursing Interventions Patient declined scheduling/rescheduling appointment at this time   Has home health visited the patient within 72 hours of discharge? N/A   Psychosocial issues? No   Did the patient receive a copy of their discharge instructions? Yes   Nursing interventions Reviewed instructions with patient   What is the patient's perception of their health status since discharge? Improving   Is the patient/caregiver able to teach back the hierarchy of who to call/visit for symptoms/problems? PCP, Specialist, Home health nurse, Urgent Care, ED, 911 Yes   TCM call completed? Yes   Call end time 1217   Would this patient benefit from a Referral to Amb Social Work? No   Is the patient interested in additional calls from an ambulatory ? No            Meghana Scott RN    4/22/2024, 12:17 EDT

## 2024-04-23 LAB
GLUCOSE FLD-MCNC: 72 MG/DL
PROT FLD-MCNC: 3.6 G/DL

## 2024-04-24 LAB — BACTERIA SPEC AEROBE CULT: NORMAL

## 2024-04-25 LAB — BACTERIA SPEC AEROBE CULT: NORMAL

## 2024-04-29 ENCOUNTER — LAB (OUTPATIENT)
Dept: LAB | Facility: HOSPITAL | Age: 64
End: 2024-04-29
Payer: MEDICARE

## 2024-04-29 ENCOUNTER — TRANSCRIBE ORDERS (OUTPATIENT)
Dept: LAB | Facility: HOSPITAL | Age: 64
End: 2024-04-29
Payer: MEDICARE

## 2024-04-29 DIAGNOSIS — M71.121 OTHER INFECTIVE BURSITIS, RIGHT ELBOW: Primary | ICD-10-CM

## 2024-04-29 DIAGNOSIS — M70.31: ICD-10-CM

## 2024-04-29 DIAGNOSIS — L03.113 CELLULITIS OF RIGHT HAND EXCLUDING FINGERS AND THUMB: ICD-10-CM

## 2024-04-29 DIAGNOSIS — M71.121 OTHER INFECTIVE BURSITIS, RIGHT ELBOW: ICD-10-CM

## 2024-04-29 LAB
ANION GAP SERPL CALCULATED.3IONS-SCNC: 12 MMOL/L (ref 5–15)
BUN SERPL-MCNC: 6 MG/DL (ref 8–23)
BUN/CREAT SERPL: 28.6 (ref 7–25)
CALCIUM SPEC-SCNC: 8.9 MG/DL (ref 8.6–10.5)
CHLORIDE SERPL-SCNC: 94 MMOL/L (ref 98–107)
CK SERPL-CCNC: 129 U/L (ref 20–200)
CO2 SERPL-SCNC: 23 MMOL/L (ref 22–29)
CREAT SERPL-MCNC: 0.21 MG/DL (ref 0.76–1.27)
CRP SERPL-MCNC: 4.94 MG/DL (ref 0–0.5)
EGFRCR SERPLBLD CKD-EPI 2021: 147.1 ML/MIN/1.73
GLUCOSE SERPL-MCNC: 83 MG/DL (ref 65–99)
POTASSIUM SERPL-SCNC: 4.1 MMOL/L (ref 3.5–5.2)
SODIUM SERPL-SCNC: 129 MMOL/L (ref 136–145)

## 2024-04-29 PROCEDURE — 36415 COLL VENOUS BLD VENIPUNCTURE: CPT

## 2024-04-29 PROCEDURE — 82550 ASSAY OF CK (CPK): CPT

## 2024-04-29 PROCEDURE — 80048 BASIC METABOLIC PNL TOTAL CA: CPT

## 2024-04-29 PROCEDURE — 86140 C-REACTIVE PROTEIN: CPT

## 2024-05-23 ENCOUNTER — LAB (OUTPATIENT)
Dept: LAB | Facility: HOSPITAL | Age: 64
End: 2024-05-23
Payer: MEDICARE

## 2024-05-23 ENCOUNTER — APPOINTMENT (OUTPATIENT)
Dept: LAB | Facility: HOSPITAL | Age: 64
End: 2024-05-23
Payer: MEDICARE

## 2024-05-23 ENCOUNTER — TRANSCRIBE ORDERS (OUTPATIENT)
Dept: LAB | Facility: HOSPITAL | Age: 64
End: 2024-05-23
Payer: MEDICARE

## 2024-05-23 DIAGNOSIS — M70.31: ICD-10-CM

## 2024-05-23 DIAGNOSIS — M71.121 OTHER INFECTIVE BURSITIS, RIGHT ELBOW: Primary | ICD-10-CM

## 2024-05-23 DIAGNOSIS — L89.152 STAGE II PRESSURE ULCER OF SACRAL REGION: ICD-10-CM

## 2024-05-23 DIAGNOSIS — L03.113 CELLULITIS OF RIGHT HAND EXCLUDING FINGERS AND THUMB: ICD-10-CM

## 2024-05-23 DIAGNOSIS — M71.121 OTHER INFECTIVE BURSITIS, RIGHT ELBOW: ICD-10-CM

## 2024-05-23 LAB
ALBUMIN SERPL-MCNC: 3.8 G/DL (ref 3.5–5.2)
ALBUMIN/GLOB SERPL: 1.4 G/DL
ALP SERPL-CCNC: 68 U/L (ref 39–117)
ALT SERPL W P-5'-P-CCNC: 11 U/L (ref 1–41)
ANION GAP SERPL CALCULATED.3IONS-SCNC: 11 MMOL/L (ref 5–15)
AST SERPL-CCNC: 15 U/L (ref 1–40)
BASOPHILS # BLD AUTO: 0.07 10*3/MM3 (ref 0–0.2)
BASOPHILS NFR BLD AUTO: 1 % (ref 0–1.5)
BILIRUB SERPL-MCNC: 0.3 MG/DL (ref 0–1.2)
BUN SERPL-MCNC: 4 MG/DL (ref 8–23)
BUN/CREAT SERPL: 9.5 (ref 7–25)
CALCIUM SPEC-SCNC: 8.9 MG/DL (ref 8.6–10.5)
CHLORIDE SERPL-SCNC: 95 MMOL/L (ref 98–107)
CO2 SERPL-SCNC: 24 MMOL/L (ref 22–29)
CREAT SERPL-MCNC: 0.42 MG/DL (ref 0.76–1.27)
CRP SERPL-MCNC: 1.53 MG/DL (ref 0–0.5)
DEPRECATED RDW RBC AUTO: 42.8 FL (ref 37–54)
EGFRCR SERPLBLD CKD-EPI 2021: 119.3 ML/MIN/1.73
EOSINOPHIL # BLD AUTO: 0.28 10*3/MM3 (ref 0–0.4)
EOSINOPHIL NFR BLD AUTO: 4 % (ref 0.3–6.2)
ERYTHROCYTE [DISTWIDTH] IN BLOOD BY AUTOMATED COUNT: 12.8 % (ref 12.3–15.4)
ERYTHROCYTE [SEDIMENTATION RATE] IN BLOOD: 13 MM/HR (ref 0–20)
GLOBULIN UR ELPH-MCNC: 2.8 GM/DL
GLUCOSE SERPL-MCNC: 128 MG/DL (ref 65–99)
HCT VFR BLD AUTO: 33.7 % (ref 37.5–51)
HGB BLD-MCNC: 11.2 G/DL (ref 13–17.7)
IMM GRANULOCYTES # BLD AUTO: 0.01 10*3/MM3 (ref 0–0.05)
IMM GRANULOCYTES NFR BLD AUTO: 0.1 % (ref 0–0.5)
LYMPHOCYTES # BLD AUTO: 1.43 10*3/MM3 (ref 0.7–3.1)
LYMPHOCYTES NFR BLD AUTO: 20.4 % (ref 19.6–45.3)
MCH RBC QN AUTO: 30.4 PG (ref 26.6–33)
MCHC RBC AUTO-ENTMCNC: 33.2 G/DL (ref 31.5–35.7)
MCV RBC AUTO: 91.6 FL (ref 79–97)
MONOCYTES # BLD AUTO: 0.77 10*3/MM3 (ref 0.1–0.9)
MONOCYTES NFR BLD AUTO: 11 % (ref 5–12)
NEUTROPHILS NFR BLD AUTO: 4.45 10*3/MM3 (ref 1.7–7)
NEUTROPHILS NFR BLD AUTO: 63.5 % (ref 42.7–76)
NRBC BLD AUTO-RTO: 0 /100 WBC (ref 0–0.2)
PLATELET # BLD AUTO: 194 10*3/MM3 (ref 140–450)
PMV BLD AUTO: 10.1 FL (ref 6–12)
POTASSIUM SERPL-SCNC: 3.8 MMOL/L (ref 3.5–5.2)
PROT SERPL-MCNC: 6.6 G/DL (ref 6–8.5)
RBC # BLD AUTO: 3.68 10*6/MM3 (ref 4.14–5.8)
SODIUM SERPL-SCNC: 130 MMOL/L (ref 136–145)
WBC NRBC COR # BLD AUTO: 7.01 10*3/MM3 (ref 3.4–10.8)

## 2024-05-23 PROCEDURE — 85025 COMPLETE CBC W/AUTO DIFF WBC: CPT

## 2024-05-23 PROCEDURE — 80053 COMPREHEN METABOLIC PANEL: CPT

## 2024-05-23 PROCEDURE — 86140 C-REACTIVE PROTEIN: CPT

## 2024-05-23 PROCEDURE — 85652 RBC SED RATE AUTOMATED: CPT

## 2024-05-23 PROCEDURE — 36415 COLL VENOUS BLD VENIPUNCTURE: CPT

## 2024-09-05 ENCOUNTER — TELEPHONE (OUTPATIENT)
Dept: FAMILY MEDICINE CLINIC | Facility: CLINIC | Age: 64
End: 2024-09-05

## 2024-09-05 DIAGNOSIS — R39.9 UTI SYMPTOMS: Primary | ICD-10-CM

## 2024-09-05 NOTE — TELEPHONE ENCOUNTER
"Caller: Baudilio Toledo \"Martin\"    Relationship: Self    Best call back number:       [phone] (Mobile)     What orders are you requesting (i.e. lab or imaging):     URINALYSIS    In what timeframe would the patient need to come in:     AS SOON AS POSSIBLE    Where will you receive your lab/imaging services:     OFFICE LAB    Additional notes:     PATIENT STATED HE HAS UTI SYMPTOMS AND WOULD LIKE TO DROP OFF TO OFFICE LAB URINE SAMPLE FOR TESTING    PLEASE CONTACT PATIENT WHEN ORDER HAS BEEN PLACED OR WITH ANY QUESTIONS AND/OR CONCERNS    "

## 2024-09-06 PROCEDURE — 87077 CULTURE AEROBIC IDENTIFY: CPT | Performed by: STUDENT IN AN ORGANIZED HEALTH CARE EDUCATION/TRAINING PROGRAM

## 2024-09-06 PROCEDURE — 87186 SC STD MICRODIL/AGAR DIL: CPT | Performed by: STUDENT IN AN ORGANIZED HEALTH CARE EDUCATION/TRAINING PROGRAM

## 2024-09-06 PROCEDURE — 87086 URINE CULTURE/COLONY COUNT: CPT | Performed by: STUDENT IN AN ORGANIZED HEALTH CARE EDUCATION/TRAINING PROGRAM

## 2024-09-08 ENCOUNTER — APPOINTMENT (OUTPATIENT)
Dept: ULTRASOUND IMAGING | Facility: HOSPITAL | Age: 64
End: 2024-09-08
Payer: MEDICARE

## 2024-09-08 ENCOUNTER — HOSPITAL ENCOUNTER (EMERGENCY)
Facility: HOSPITAL | Age: 64
Discharge: HOME | End: 2024-09-08
Attending: EMERGENCY MEDICINE | Admitting: EMERGENCY MEDICINE
Payer: MEDICARE

## 2024-09-08 VITALS
TEMPERATURE: 97.9 F | DIASTOLIC BLOOD PRESSURE: 82 MMHG | RESPIRATION RATE: 18 BRPM | BODY MASS INDEX: 24.5 KG/M2 | SYSTOLIC BLOOD PRESSURE: 150 MMHG | OXYGEN SATURATION: 100 % | HEART RATE: 83 BPM | WEIGHT: 175 LBS | HEIGHT: 71 IN

## 2024-09-08 DIAGNOSIS — D72.829 LEUKOCYTOSIS, UNSPECIFIED TYPE: ICD-10-CM

## 2024-09-08 DIAGNOSIS — G90.4 AUTONOMIC DYSREFLEXIA: ICD-10-CM

## 2024-09-08 DIAGNOSIS — N39.0 UTI (URINARY TRACT INFECTION) WITH PYURIA: Primary | ICD-10-CM

## 2024-09-08 DIAGNOSIS — N47.6 BALANOPOSTHITIS: ICD-10-CM

## 2024-09-08 LAB
ALBUMIN SERPL-MCNC: 3.1 G/DL (ref 3.5–5.2)
ALBUMIN/GLOB SERPL: 0.9 G/DL
ALP SERPL-CCNC: 105 U/L (ref 39–117)
ALT SERPL W P-5'-P-CCNC: 10 U/L (ref 1–41)
ANION GAP SERPL CALCULATED.3IONS-SCNC: 14 MMOL/L (ref 5–15)
AST SERPL-CCNC: 13 U/L (ref 1–40)
BACTERIA UR QL AUTO: ABNORMAL /HPF
BASOPHILS # BLD AUTO: 0.09 10*3/MM3 (ref 0–0.2)
BASOPHILS NFR BLD AUTO: 0.4 % (ref 0–1.5)
BILIRUB SERPL-MCNC: 0.4 MG/DL (ref 0–1.2)
BILIRUB UR QL STRIP: NEGATIVE
BUN SERPL-MCNC: 8 MG/DL (ref 8–23)
BUN/CREAT SERPL: 23.5 (ref 7–25)
CALCIUM SPEC-SCNC: 8.8 MG/DL (ref 8.6–10.5)
CHLORIDE SERPL-SCNC: 92 MMOL/L (ref 98–107)
CLARITY UR: CLEAR
CO2 SERPL-SCNC: 21 MMOL/L (ref 22–29)
COLOR UR: YELLOW
CREAT SERPL-MCNC: 0.34 MG/DL (ref 0.76–1.27)
CRP SERPL-MCNC: 26.82 MG/DL (ref 0–0.5)
D-LACTATE SERPL-SCNC: 1.2 MMOL/L (ref 0.5–2)
DEPRECATED RDW RBC AUTO: 44.2 FL (ref 37–54)
EGFRCR SERPLBLD CKD-EPI 2021: 127.2 ML/MIN/1.73
EOSINOPHIL # BLD AUTO: 0.02 10*3/MM3 (ref 0–0.4)
EOSINOPHIL NFR BLD AUTO: 0.1 % (ref 0.3–6.2)
ERYTHROCYTE [DISTWIDTH] IN BLOOD BY AUTOMATED COUNT: 13.4 % (ref 12.3–15.4)
ERYTHROCYTE [SEDIMENTATION RATE] IN BLOOD: 77 MM/HR (ref 0–20)
GLOBULIN UR ELPH-MCNC: 3.6 GM/DL
GLUCOSE SERPL-MCNC: 110 MG/DL (ref 65–99)
GLUCOSE UR STRIP-MCNC: NEGATIVE MG/DL
HCT VFR BLD AUTO: 40.8 % (ref 37.5–51)
HGB BLD-MCNC: 13.6 G/DL (ref 13–17.7)
HGB UR QL STRIP.AUTO: ABNORMAL
HYALINE CASTS UR QL AUTO: ABNORMAL /LPF
IMM GRANULOCYTES # BLD AUTO: 0.25 10*3/MM3 (ref 0–0.05)
IMM GRANULOCYTES NFR BLD AUTO: 1.1 % (ref 0–0.5)
KETONES UR QL STRIP: ABNORMAL
LEUKOCYTE ESTERASE UR QL STRIP.AUTO: ABNORMAL
LIPASE SERPL-CCNC: 11 U/L (ref 13–60)
LYMPHOCYTES # BLD AUTO: 0.46 10*3/MM3 (ref 0.7–3.1)
LYMPHOCYTES NFR BLD AUTO: 1.9 % (ref 19.6–45.3)
MCH RBC QN AUTO: 30 PG (ref 26.6–33)
MCHC RBC AUTO-ENTMCNC: 33.3 G/DL (ref 31.5–35.7)
MCV RBC AUTO: 90.1 FL (ref 79–97)
MONOCYTES # BLD AUTO: 0.98 10*3/MM3 (ref 0.1–0.9)
MONOCYTES NFR BLD AUTO: 4.1 % (ref 5–12)
NEUTROPHILS NFR BLD AUTO: 21.99 10*3/MM3 (ref 1.7–7)
NEUTROPHILS NFR BLD AUTO: 92.4 % (ref 42.7–76)
NITRITE UR QL STRIP: POSITIVE
NRBC BLD AUTO-RTO: 0 /100 WBC (ref 0–0.2)
PH UR STRIP.AUTO: 6.5 [PH] (ref 5–8)
PLATELET # BLD AUTO: 298 10*3/MM3 (ref 140–450)
PMV BLD AUTO: 8.5 FL (ref 6–12)
POTASSIUM SERPL-SCNC: 4.1 MMOL/L (ref 3.5–5.2)
PROT SERPL-MCNC: 6.7 G/DL (ref 6–8.5)
PROT UR QL STRIP: ABNORMAL
RBC # BLD AUTO: 4.53 10*6/MM3 (ref 4.14–5.8)
RBC # UR STRIP: ABNORMAL /HPF
REF LAB TEST METHOD: ABNORMAL
SODIUM SERPL-SCNC: 127 MMOL/L (ref 136–145)
SP GR UR STRIP: 1.01 (ref 1–1.03)
SQUAMOUS #/AREA URNS HPF: ABNORMAL /HPF
UROBILINOGEN UR QL STRIP: ABNORMAL
WBC # UR STRIP: ABNORMAL /HPF
WBC NRBC COR # BLD AUTO: 23.79 10*3/MM3 (ref 3.4–10.8)

## 2024-09-08 PROCEDURE — 25010000002 ERTAPENEM PER 500 MG

## 2024-09-08 PROCEDURE — 96365 THER/PROPH/DIAG IV INF INIT: CPT

## 2024-09-08 PROCEDURE — 25010000002 CEFEPIME PER 500 MG

## 2024-09-08 PROCEDURE — 87040 BLOOD CULTURE FOR BACTERIA: CPT

## 2024-09-08 PROCEDURE — 96367 TX/PROPH/DG ADDL SEQ IV INF: CPT

## 2024-09-08 PROCEDURE — 85652 RBC SED RATE AUTOMATED: CPT

## 2024-09-08 PROCEDURE — 83605 ASSAY OF LACTIC ACID: CPT

## 2024-09-08 PROCEDURE — 36415 COLL VENOUS BLD VENIPUNCTURE: CPT

## 2024-09-08 PROCEDURE — P9612 CATHETERIZE FOR URINE SPEC: HCPCS

## 2024-09-08 PROCEDURE — 81001 URINALYSIS AUTO W/SCOPE: CPT

## 2024-09-08 PROCEDURE — 99284 EMERGENCY DEPT VISIT MOD MDM: CPT

## 2024-09-08 PROCEDURE — 86140 C-REACTIVE PROTEIN: CPT

## 2024-09-08 PROCEDURE — 83690 ASSAY OF LIPASE: CPT

## 2024-09-08 PROCEDURE — 87086 URINE CULTURE/COLONY COUNT: CPT

## 2024-09-08 PROCEDURE — 87077 CULTURE AEROBIC IDENTIFY: CPT

## 2024-09-08 PROCEDURE — 85025 COMPLETE CBC W/AUTO DIFF WBC: CPT

## 2024-09-08 PROCEDURE — 80053 COMPREHEN METABOLIC PANEL: CPT

## 2024-09-08 PROCEDURE — 76870 US EXAM SCROTUM: CPT

## 2024-09-08 PROCEDURE — 87186 SC STD MICRODIL/AGAR DIL: CPT

## 2024-09-08 RX ORDER — SODIUM CHLORIDE 0.9 % (FLUSH) 0.9 %
10 SYRINGE (ML) INJECTION AS NEEDED
Status: DISCONTINUED | OUTPATIENT
Start: 2024-09-08 | End: 2024-09-08 | Stop reason: HOSPADM

## 2024-09-08 RX ADMIN — Medication 1000 MG: at 18:48

## 2024-09-08 RX ADMIN — CEFEPIME 2000 MG: 2 INJECTION, POWDER, FOR SOLUTION INTRAVENOUS at 16:25
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
## 2024-09-08 NOTE — DISCHARGE INSTRUCTIONS
Called the infectious disease office of Dr. Andry Szymanski tomorrow, for recommendations on follow-up treatment including IV infusion of antibiotics.  We have cultured the urine specimen today.

## 2024-09-10 DIAGNOSIS — N30.00 ACUTE CYSTITIS WITHOUT HEMATURIA: Primary | ICD-10-CM

## 2024-09-10 LAB — BACTERIA SPEC AEROBE CULT: ABNORMAL

## 2024-09-12 ENCOUNTER — TRANSCRIBE ORDERS (OUTPATIENT)
Dept: LAB | Facility: HOSPITAL | Age: 64
End: 2024-09-12
Payer: MEDICARE

## 2024-09-12 ENCOUNTER — LAB (OUTPATIENT)
Dept: LAB | Facility: HOSPITAL | Age: 64
End: 2024-09-12
Payer: MEDICARE

## 2024-09-12 DIAGNOSIS — N47.6 BALANOPOSTHITIS: ICD-10-CM

## 2024-09-12 DIAGNOSIS — N39.0 URINARY TRACT INFECTION WITHOUT HEMATURIA, SITE UNSPECIFIED: ICD-10-CM

## 2024-09-12 DIAGNOSIS — D72.823 NEUTROPHILIC LEUKEMOID REACTION: ICD-10-CM

## 2024-09-12 DIAGNOSIS — N39.0 URINARY TRACT INFECTION WITHOUT HEMATURIA, SITE UNSPECIFIED: Primary | ICD-10-CM

## 2024-09-12 LAB
ALBUMIN SERPL-MCNC: 3.1 G/DL (ref 3.5–5.2)
ALBUMIN/GLOB SERPL: 0.9 G/DL
ALP SERPL-CCNC: 130 U/L (ref 39–117)
ALT SERPL W P-5'-P-CCNC: 13 U/L (ref 1–41)
ANION GAP SERPL CALCULATED.3IONS-SCNC: 12 MMOL/L (ref 5–15)
AST SERPL-CCNC: 19 U/L (ref 1–40)
BASOPHILS # BLD AUTO: 0.06 10*3/MM3 (ref 0–0.2)
BASOPHILS NFR BLD AUTO: 0.3 % (ref 0–1.5)
BILIRUB SERPL-MCNC: 0.2 MG/DL (ref 0–1.2)
BUN SERPL-MCNC: 8 MG/DL (ref 8–23)
BUN/CREAT SERPL: 23.5 (ref 7–25)
CALCIUM SPEC-SCNC: 8.4 MG/DL (ref 8.6–10.5)
CHLORIDE SERPL-SCNC: 85 MMOL/L (ref 98–107)
CO2 SERPL-SCNC: 23 MMOL/L (ref 22–29)
CREAT SERPL-MCNC: 0.34 MG/DL (ref 0.76–1.27)
CRP SERPL-MCNC: 17.25 MG/DL (ref 0–0.5)
DEPRECATED RDW RBC AUTO: 40.8 FL (ref 37–54)
EGFRCR SERPLBLD CKD-EPI 2021: 127.2 ML/MIN/1.73
EOSINOPHIL # BLD AUTO: 0.01 10*3/MM3 (ref 0–0.4)
EOSINOPHIL NFR BLD AUTO: 0 % (ref 0.3–6.2)
ERYTHROCYTE [DISTWIDTH] IN BLOOD BY AUTOMATED COUNT: 13.1 % (ref 12.3–15.4)
ERYTHROCYTE [SEDIMENTATION RATE] IN BLOOD: 56 MM/HR (ref 0–20)
GLOBULIN UR ELPH-MCNC: 3.4 GM/DL
GLUCOSE SERPL-MCNC: 157 MG/DL (ref 65–99)
HCT VFR BLD AUTO: 35.9 % (ref 37.5–51)
HGB BLD-MCNC: 12.6 G/DL (ref 13–17.7)
IMM GRANULOCYTES # BLD AUTO: 0.19 10*3/MM3 (ref 0–0.05)
IMM GRANULOCYTES NFR BLD AUTO: 0.9 % (ref 0–0.5)
LYMPHOCYTES # BLD AUTO: 0.61 10*3/MM3 (ref 0.7–3.1)
LYMPHOCYTES NFR BLD AUTO: 3 % (ref 19.6–45.3)
MCH RBC QN AUTO: 30.1 PG (ref 26.6–33)
MCHC RBC AUTO-ENTMCNC: 35.1 G/DL (ref 31.5–35.7)
MCV RBC AUTO: 85.9 FL (ref 79–97)
MONOCYTES # BLD AUTO: 1.48 10*3/MM3 (ref 0.1–0.9)
MONOCYTES NFR BLD AUTO: 7.3 % (ref 5–12)
NEUTROPHILS NFR BLD AUTO: 17.91 10*3/MM3 (ref 1.7–7)
NEUTROPHILS NFR BLD AUTO: 88.5 % (ref 42.7–76)
NRBC BLD AUTO-RTO: 0 /100 WBC (ref 0–0.2)
PLATELET # BLD AUTO: 300 10*3/MM3 (ref 140–450)
PMV BLD AUTO: 8.6 FL (ref 6–12)
POTASSIUM SERPL-SCNC: 3.7 MMOL/L (ref 3.5–5.2)
PROT SERPL-MCNC: 6.5 G/DL (ref 6–8.5)
RBC # BLD AUTO: 4.18 10*6/MM3 (ref 4.14–5.8)
SODIUM SERPL-SCNC: 120 MMOL/L (ref 136–145)
WBC NRBC COR # BLD AUTO: 20.26 10*3/MM3 (ref 3.4–10.8)

## 2024-09-12 PROCEDURE — 86140 C-REACTIVE PROTEIN: CPT | Performed by: INTERNAL MEDICINE

## 2024-09-12 PROCEDURE — 80053 COMPREHEN METABOLIC PANEL: CPT | Performed by: INTERNAL MEDICINE

## 2024-09-12 PROCEDURE — 85652 RBC SED RATE AUTOMATED: CPT

## 2024-09-12 PROCEDURE — 36415 COLL VENOUS BLD VENIPUNCTURE: CPT | Performed by: INTERNAL MEDICINE

## 2024-09-12 PROCEDURE — 85025 COMPLETE CBC W/AUTO DIFF WBC: CPT | Performed by: INTERNAL MEDICINE

## 2024-09-13 ENCOUNTER — HOSPITAL ENCOUNTER (INPATIENT)
Facility: HOSPITAL | Age: 64
LOS: 2 days | Discharge: HOME | DRG: 640 | End: 2024-09-16
Attending: EMERGENCY MEDICINE | Admitting: FAMILY MEDICINE
Payer: MEDICARE

## 2024-09-13 DIAGNOSIS — E87.1 HYPONATREMIA: Primary | ICD-10-CM

## 2024-09-13 DIAGNOSIS — Z87.440 HISTORY OF UTI: ICD-10-CM

## 2024-09-13 DIAGNOSIS — G82.20 PARAPLEGIA: ICD-10-CM

## 2024-09-13 LAB
ALBUMIN SERPL-MCNC: 3.1 G/DL (ref 3.5–5.2)
ALBUMIN/GLOB SERPL: 0.9 G/DL
ALP SERPL-CCNC: 104 U/L (ref 39–117)
ALT SERPL W P-5'-P-CCNC: 16 U/L (ref 1–41)
ANION GAP SERPL CALCULATED.3IONS-SCNC: 11 MMOL/L (ref 5–15)
AST SERPL-CCNC: 20 U/L (ref 1–40)
BACTERIA SPEC AEROBE CULT: NORMAL
BACTERIA SPEC AEROBE CULT: NORMAL
BACTERIA UR QL AUTO: ABNORMAL /HPF
BASOPHILS # BLD AUTO: 0.06 10*3/MM3 (ref 0–0.2)
BASOPHILS NFR BLD AUTO: 0.6 % (ref 0–1.5)
BILIRUB SERPL-MCNC: 0.3 MG/DL (ref 0–1.2)
BILIRUB UR QL STRIP: ABNORMAL
BUN SERPL-MCNC: 6 MG/DL (ref 8–23)
BUN/CREAT SERPL: 24 (ref 7–25)
CALCIUM SPEC-SCNC: 8.6 MG/DL (ref 8.6–10.5)
CHLORIDE SERPL-SCNC: 87 MMOL/L (ref 98–107)
CLARITY UR: ABNORMAL
CO2 SERPL-SCNC: 25 MMOL/L (ref 22–29)
COLOR UR: ABNORMAL
CREAT SERPL-MCNC: 0.25 MG/DL (ref 0.76–1.27)
DEPRECATED RDW RBC AUTO: 41.9 FL (ref 37–54)
EGFRCR SERPLBLD CKD-EPI 2021: 139.5 ML/MIN/1.73
EOSINOPHIL # BLD AUTO: 0.06 10*3/MM3 (ref 0–0.4)
EOSINOPHIL NFR BLD AUTO: 0.6 % (ref 0.3–6.2)
ERYTHROCYTE [DISTWIDTH] IN BLOOD BY AUTOMATED COUNT: 13.2 % (ref 12.3–15.4)
GLOBULIN UR ELPH-MCNC: 3.3 GM/DL
GLUCOSE SERPL-MCNC: 105 MG/DL (ref 65–99)
GLUCOSE UR STRIP-MCNC: NEGATIVE MG/DL
HCT VFR BLD AUTO: 36.5 % (ref 37.5–51)
HGB BLD-MCNC: 12.7 G/DL (ref 13–17.7)
HGB UR QL STRIP.AUTO: ABNORMAL
HYALINE CASTS UR QL AUTO: ABNORMAL /LPF
IMM GRANULOCYTES # BLD AUTO: 0.16 10*3/MM3 (ref 0–0.05)
IMM GRANULOCYTES NFR BLD AUTO: 1.5 % (ref 0–0.5)
KETONES UR QL STRIP: NEGATIVE
LEUKOCYTE ESTERASE UR QL STRIP.AUTO: ABNORMAL
LYMPHOCYTES # BLD AUTO: 1.01 10*3/MM3 (ref 0.7–3.1)
LYMPHOCYTES NFR BLD AUTO: 9.6 % (ref 19.6–45.3)
MAGNESIUM SERPL-MCNC: 1.9 MG/DL (ref 1.6–2.4)
MCH RBC QN AUTO: 30.2 PG (ref 26.6–33)
MCHC RBC AUTO-ENTMCNC: 34.8 G/DL (ref 31.5–35.7)
MCV RBC AUTO: 86.9 FL (ref 79–97)
MONOCYTES # BLD AUTO: 1.13 10*3/MM3 (ref 0.1–0.9)
MONOCYTES NFR BLD AUTO: 10.8 % (ref 5–12)
NEUTROPHILS NFR BLD AUTO: 76.9 % (ref 42.7–76)
NEUTROPHILS NFR BLD AUTO: 8.09 10*3/MM3 (ref 1.7–7)
NITRITE UR QL STRIP: POSITIVE
NRBC BLD AUTO-RTO: 0 /100 WBC (ref 0–0.2)
OSMOLALITY UR: 313 MOSM/KG (ref 300–1100)
PH UR STRIP.AUTO: 5.5 [PH] (ref 5–8)
PLATELET # BLD AUTO: 319 10*3/MM3 (ref 140–450)
PMV BLD AUTO: 8.1 FL (ref 6–12)
POTASSIUM SERPL-SCNC: 3.8 MMOL/L (ref 3.5–5.2)
PROT SERPL-MCNC: 6.4 G/DL (ref 6–8.5)
PROT UR QL STRIP: ABNORMAL
RBC # BLD AUTO: 4.2 10*6/MM3 (ref 4.14–5.8)
RBC # UR STRIP: ABNORMAL /HPF
REF LAB TEST METHOD: ABNORMAL
SODIUM SERPL-SCNC: 122 MMOL/L (ref 136–145)
SODIUM SERPL-SCNC: 123 MMOL/L (ref 136–145)
SODIUM UR-SCNC: 33 MMOL/L
SP GR UR STRIP: 1.01 (ref 1–1.03)
SQUAMOUS #/AREA URNS HPF: ABNORMAL /HPF
T4 FREE SERPL-MCNC: 1.35 NG/DL (ref 0.92–1.68)
TSH SERPL DL<=0.05 MIU/L-ACNC: 1.06 UIU/ML (ref 0.27–4.2)
UROBILINOGEN UR QL STRIP: ABNORMAL
WBC # UR STRIP: ABNORMAL /HPF
WBC NRBC COR # BLD AUTO: 10.51 10*3/MM3 (ref 3.4–10.8)

## 2024-09-13 PROCEDURE — 36415 COLL VENOUS BLD VENIPUNCTURE: CPT

## 2024-09-13 PROCEDURE — 87086 URINE CULTURE/COLONY COUNT: CPT | Performed by: EMERGENCY MEDICINE

## 2024-09-13 PROCEDURE — G0378 HOSPITAL OBSERVATION PER HR: HCPCS

## 2024-09-13 PROCEDURE — 25810000003 SODIUM CHLORIDE 0.9 % SOLUTION: Performed by: INTERNAL MEDICINE

## 2024-09-13 PROCEDURE — 84439 ASSAY OF FREE THYROXINE: CPT | Performed by: EMERGENCY MEDICINE

## 2024-09-13 PROCEDURE — 85025 COMPLETE CBC W/AUTO DIFF WBC: CPT | Performed by: EMERGENCY MEDICINE

## 2024-09-13 PROCEDURE — 83935 ASSAY OF URINE OSMOLALITY: CPT | Performed by: INTERNAL MEDICINE

## 2024-09-13 PROCEDURE — 80053 COMPREHEN METABOLIC PANEL: CPT | Performed by: EMERGENCY MEDICINE

## 2024-09-13 PROCEDURE — 99222 1ST HOSP IP/OBS MODERATE 55: CPT | Performed by: INTERNAL MEDICINE

## 2024-09-13 PROCEDURE — 81001 URINALYSIS AUTO W/SCOPE: CPT | Performed by: EMERGENCY MEDICINE

## 2024-09-13 PROCEDURE — 25810000003 SODIUM CHLORIDE 0.9 % SOLUTION: Performed by: EMERGENCY MEDICINE

## 2024-09-13 PROCEDURE — 99285 EMERGENCY DEPT VISIT HI MDM: CPT

## 2024-09-13 PROCEDURE — 84443 ASSAY THYROID STIM HORMONE: CPT | Performed by: EMERGENCY MEDICINE

## 2024-09-13 PROCEDURE — 84300 ASSAY OF URINE SODIUM: CPT | Performed by: INTERNAL MEDICINE

## 2024-09-13 PROCEDURE — 25010000002 ERTAPENEM PER 500 MG: Performed by: INTERNAL MEDICINE

## 2024-09-13 PROCEDURE — 84295 ASSAY OF SERUM SODIUM: CPT | Performed by: INTERNAL MEDICINE

## 2024-09-13 PROCEDURE — 82570 ASSAY OF URINE CREATININE: CPT | Performed by: INTERNAL MEDICINE

## 2024-09-13 PROCEDURE — 83735 ASSAY OF MAGNESIUM: CPT | Performed by: EMERGENCY MEDICINE

## 2024-09-13 PROCEDURE — 25010000002 HEPARIN (PORCINE) PER 1000 UNITS: Performed by: INTERNAL MEDICINE

## 2024-09-13 RX ORDER — SODIUM CHLORIDE 0.9 % (FLUSH) 0.9 %
10 SYRINGE (ML) INJECTION EVERY 12 HOURS SCHEDULED
Status: DISCONTINUED | OUTPATIENT
Start: 2024-09-13 | End: 2024-09-16 | Stop reason: HOSPADM

## 2024-09-13 RX ORDER — SODIUM CHLORIDE 9 MG/ML
100 INJECTION, SOLUTION INTRAVENOUS CONTINUOUS
Status: DISCONTINUED | OUTPATIENT
Start: 2024-09-13 | End: 2024-09-14

## 2024-09-13 RX ORDER — VANCOMYCIN HYDROCHLORIDE 125 MG/1
1 CAPSULE ORAL 2 TIMES DAILY
COMMUNITY
Start: 2024-09-11 | End: 2024-09-20

## 2024-09-13 RX ORDER — POLYETHYLENE GLYCOL 3350 17 G/17G
17 POWDER, FOR SOLUTION ORAL DAILY PRN
Status: DISCONTINUED | OUTPATIENT
Start: 2024-09-13 | End: 2024-09-16 | Stop reason: HOSPADM

## 2024-09-13 RX ORDER — SODIUM CHLORIDE 0.9 % (FLUSH) 0.9 %
10 SYRINGE (ML) INJECTION AS NEEDED
Status: DISCONTINUED | OUTPATIENT
Start: 2024-09-13 | End: 2024-09-16 | Stop reason: HOSPADM

## 2024-09-13 RX ORDER — ONDANSETRON 2 MG/ML
4 INJECTION INTRAMUSCULAR; INTRAVENOUS EVERY 6 HOURS PRN
Status: DISCONTINUED | OUTPATIENT
Start: 2024-09-13 | End: 2024-09-16 | Stop reason: HOSPADM

## 2024-09-13 RX ORDER — HEPARIN SODIUM 5000 [USP'U]/ML
5000 INJECTION, SOLUTION INTRAVENOUS; SUBCUTANEOUS EVERY 8 HOURS SCHEDULED
Status: DISCONTINUED | OUTPATIENT
Start: 2024-09-13 | End: 2024-09-14

## 2024-09-13 RX ORDER — SODIUM CHLORIDE 9 MG/ML
40 INJECTION, SOLUTION INTRAVENOUS AS NEEDED
Status: DISCONTINUED | OUTPATIENT
Start: 2024-09-13 | End: 2024-09-16 | Stop reason: HOSPADM

## 2024-09-13 RX ORDER — OXYBUTYNIN CHLORIDE 5 MG/1
5 TABLET ORAL 3 TIMES DAILY
Status: DISCONTINUED | OUTPATIENT
Start: 2024-09-13 | End: 2024-09-16 | Stop reason: HOSPADM

## 2024-09-13 RX ORDER — AMOXICILLIN 250 MG
2 CAPSULE ORAL 2 TIMES DAILY PRN
Status: DISCONTINUED | OUTPATIENT
Start: 2024-09-13 | End: 2024-09-16 | Stop reason: HOSPADM

## 2024-09-13 RX ORDER — CIPROFLOXACIN 500 MG/1
500 TABLET, FILM COATED ORAL 2 TIMES DAILY
COMMUNITY
End: 2024-09-16 | Stop reason: HOSPADM

## 2024-09-13 RX ORDER — VANCOMYCIN HYDROCHLORIDE 125 MG/1
125 CAPSULE ORAL 2 TIMES DAILY
Status: DISCONTINUED | OUTPATIENT
Start: 2024-09-13 | End: 2024-09-16 | Stop reason: HOSPADM

## 2024-09-13 RX ORDER — BISACODYL 10 MG
10 SUPPOSITORY, RECTAL RECTAL DAILY PRN
Status: DISCONTINUED | OUTPATIENT
Start: 2024-09-13 | End: 2024-09-16 | Stop reason: HOSPADM

## 2024-09-13 RX ORDER — BISACODYL 5 MG/1
5 TABLET, DELAYED RELEASE ORAL DAILY PRN
Status: DISCONTINUED | OUTPATIENT
Start: 2024-09-13 | End: 2024-09-16 | Stop reason: HOSPADM

## 2024-09-13 RX ADMIN — HEPARIN SODIUM 5000 UNITS: 5000 INJECTION INTRAVENOUS; SUBCUTANEOUS at 21:54

## 2024-09-13 RX ADMIN — OXYBUTYNIN CHLORIDE 5 MG: 5 TABLET ORAL at 15:33

## 2024-09-13 RX ADMIN — ERTAPENEM 1000 MG: 1 INJECTION INTRAMUSCULAR; INTRAVENOUS at 12:20

## 2024-09-13 RX ADMIN — SODIUM CHLORIDE 100 ML/HR: 9 INJECTION, SOLUTION INTRAVENOUS at 21:59

## 2024-09-13 RX ADMIN — HEPARIN SODIUM 5000 UNITS: 5000 INJECTION INTRAVENOUS; SUBCUTANEOUS at 15:33

## 2024-09-13 RX ADMIN — SODIUM CHLORIDE 100 ML/HR: 9 INJECTION, SOLUTION INTRAVENOUS at 15:34

## 2024-09-13 RX ADMIN — VANCOMYCIN HYDROCHLORIDE 125 MG: 125 CAPSULE ORAL at 21:54

## 2024-09-13 RX ADMIN — OXYBUTYNIN CHLORIDE 5 MG: 5 TABLET ORAL at 21:54

## 2024-09-13 RX ADMIN — SODIUM CHLORIDE 500 ML: 9 INJECTION, SOLUTION INTRAVENOUS at 13:01

## 2024-09-14 LAB
ALBUMIN SERPL-MCNC: 2.8 G/DL (ref 3.5–5.2)
ALBUMIN/GLOB SERPL: 1.1 G/DL
ALP SERPL-CCNC: 91 U/L (ref 39–117)
ALT SERPL W P-5'-P-CCNC: 15 U/L (ref 1–41)
ANION GAP SERPL CALCULATED.3IONS-SCNC: 16 MMOL/L (ref 5–15)
ANION GAP SERPL CALCULATED.3IONS-SCNC: 7 MMOL/L (ref 5–15)
AST SERPL-CCNC: 17 U/L (ref 1–40)
BILIRUB SERPL-MCNC: 0.2 MG/DL (ref 0–1.2)
BUN SERPL-MCNC: 4 MG/DL (ref 8–23)
BUN SERPL-MCNC: 4 MG/DL (ref 8–23)
BUN/CREAT SERPL: 14.8 (ref 7–25)
BUN/CREAT SERPL: 19 (ref 7–25)
CALCIUM SPEC-SCNC: 8 MG/DL (ref 8.6–10.5)
CALCIUM SPEC-SCNC: 8.5 MG/DL (ref 8.6–10.5)
CHLORIDE SERPL-SCNC: 88 MMOL/L (ref 98–107)
CHLORIDE SERPL-SCNC: 92 MMOL/L (ref 98–107)
CO2 SERPL-SCNC: 18 MMOL/L (ref 22–29)
CO2 SERPL-SCNC: 27 MMOL/L (ref 22–29)
CREAT SERPL-MCNC: 0.21 MG/DL (ref 0.76–1.27)
CREAT SERPL-MCNC: 0.27 MG/DL (ref 0.76–1.27)
CREAT UR-MCNC: 32.5 MG/DL
DEPRECATED RDW RBC AUTO: 44.5 FL (ref 37–54)
EGFRCR SERPLBLD CKD-EPI 2021: 136.3 ML/MIN/1.73
EGFRCR SERPLBLD CKD-EPI 2021: 147.1 ML/MIN/1.73
ERYTHROCYTE [DISTWIDTH] IN BLOOD BY AUTOMATED COUNT: 13.6 % (ref 12.3–15.4)
GLOBULIN UR ELPH-MCNC: 2.5 GM/DL
GLUCOSE SERPL-MCNC: 78 MG/DL (ref 65–99)
GLUCOSE SERPL-MCNC: 92 MG/DL (ref 65–99)
HCT VFR BLD AUTO: 40.3 % (ref 37.5–51)
HGB BLD-MCNC: 13.5 G/DL (ref 13–17.7)
MCH RBC QN AUTO: 29.8 PG (ref 26.6–33)
MCHC RBC AUTO-ENTMCNC: 33.5 G/DL (ref 31.5–35.7)
MCV RBC AUTO: 89 FL (ref 79–97)
OSMOLALITY SERPL: 260 MOSM/KG (ref 275–295)
PLATELET # BLD AUTO: 295 10*3/MM3 (ref 140–450)
PMV BLD AUTO: 8.3 FL (ref 6–12)
POTASSIUM SERPL-SCNC: 4.1 MMOL/L (ref 3.5–5.2)
POTASSIUM SERPL-SCNC: 4.2 MMOL/L (ref 3.5–5.2)
PROT SERPL-MCNC: 5.3 G/DL (ref 6–8.5)
RBC # BLD AUTO: 4.53 10*6/MM3 (ref 4.14–5.8)
SODIUM SERPL-SCNC: 121 MMOL/L (ref 136–145)
SODIUM SERPL-SCNC: 122 MMOL/L (ref 136–145)
SODIUM SERPL-SCNC: 123 MMOL/L (ref 136–145)
SODIUM SERPL-SCNC: 125 MMOL/L (ref 136–145)
SODIUM SERPL-SCNC: 126 MMOL/L (ref 136–145)
WBC NRBC COR # BLD AUTO: 8.34 10*3/MM3 (ref 3.4–10.8)

## 2024-09-14 PROCEDURE — 85027 COMPLETE CBC AUTOMATED: CPT | Performed by: STUDENT IN AN ORGANIZED HEALTH CARE EDUCATION/TRAINING PROGRAM

## 2024-09-14 PROCEDURE — 84295 ASSAY OF SERUM SODIUM: CPT | Performed by: INTERNAL MEDICINE

## 2024-09-14 PROCEDURE — 25010000002 ERTAPENEM PER 500 MG: Performed by: INTERNAL MEDICINE

## 2024-09-14 PROCEDURE — 99232 SBSQ HOSP IP/OBS MODERATE 35: CPT | Performed by: STUDENT IN AN ORGANIZED HEALTH CARE EDUCATION/TRAINING PROGRAM

## 2024-09-14 PROCEDURE — 83930 ASSAY OF BLOOD OSMOLALITY: CPT | Performed by: STUDENT IN AN ORGANIZED HEALTH CARE EDUCATION/TRAINING PROGRAM

## 2024-09-14 PROCEDURE — 80053 COMPREHEN METABOLIC PANEL: CPT | Performed by: STUDENT IN AN ORGANIZED HEALTH CARE EDUCATION/TRAINING PROGRAM

## 2024-09-14 RX ORDER — ACETAMINOPHEN 325 MG/1
650 TABLET ORAL EVERY 6 HOURS PRN
Status: DISCONTINUED | OUTPATIENT
Start: 2024-09-14 | End: 2024-09-16 | Stop reason: HOSPADM

## 2024-09-14 RX ORDER — OXYCODONE HYDROCHLORIDE 5 MG/1
5 TABLET ORAL EVERY 6 HOURS PRN
Status: DISCONTINUED | OUTPATIENT
Start: 2024-09-14 | End: 2024-09-16 | Stop reason: HOSPADM

## 2024-09-14 RX ADMIN — OXYBUTYNIN CHLORIDE 5 MG: 5 TABLET ORAL at 08:17

## 2024-09-14 RX ADMIN — ACETAMINOPHEN 650 MG: 325 TABLET ORAL at 21:27

## 2024-09-14 RX ADMIN — VANCOMYCIN HYDROCHLORIDE 125 MG: 125 CAPSULE ORAL at 08:17

## 2024-09-14 RX ADMIN — VANCOMYCIN HYDROCHLORIDE 125 MG: 125 CAPSULE ORAL at 21:27

## 2024-09-14 RX ADMIN — Medication 10 ML: at 08:18

## 2024-09-14 RX ADMIN — ERTAPENEM 1000 MG: 1 INJECTION INTRAMUSCULAR; INTRAVENOUS at 08:17

## 2024-09-14 RX ADMIN — OXYBUTYNIN CHLORIDE 5 MG: 5 TABLET ORAL at 21:26

## 2024-09-14 RX ADMIN — OXYBUTYNIN CHLORIDE 5 MG: 5 TABLET ORAL at 18:55

## 2024-09-15 LAB
ANION GAP SERPL CALCULATED.3IONS-SCNC: 7 MMOL/L (ref 5–15)
BACTERIA SPEC AEROBE CULT: NO GROWTH
BASOPHILS # BLD AUTO: 0.08 10*3/MM3 (ref 0–0.2)
BASOPHILS NFR BLD AUTO: 1.3 % (ref 0–1.5)
BUN SERPL-MCNC: 3 MG/DL (ref 8–23)
BUN/CREAT SERPL: 15 (ref 7–25)
CALCIUM SPEC-SCNC: 8 MG/DL (ref 8.6–10.5)
CHLORIDE SERPL-SCNC: 93 MMOL/L (ref 98–107)
CO2 SERPL-SCNC: 26 MMOL/L (ref 22–29)
CREAT SERPL-MCNC: 0.2 MG/DL (ref 0.76–1.27)
DEPRECATED RDW RBC AUTO: 42.3 FL (ref 37–54)
EGFRCR SERPLBLD CKD-EPI 2021: 149.3 ML/MIN/1.73
EOSINOPHIL # BLD AUTO: 0.11 10*3/MM3 (ref 0–0.4)
EOSINOPHIL NFR BLD AUTO: 1.8 % (ref 0.3–6.2)
ERYTHROCYTE [DISTWIDTH] IN BLOOD BY AUTOMATED COUNT: 13.2 % (ref 12.3–15.4)
GLUCOSE SERPL-MCNC: 81 MG/DL (ref 65–99)
HCT VFR BLD AUTO: 33.5 % (ref 37.5–51)
HGB BLD-MCNC: 11.3 G/DL (ref 13–17.7)
IMM GRANULOCYTES # BLD AUTO: 0.17 10*3/MM3 (ref 0–0.05)
IMM GRANULOCYTES NFR BLD AUTO: 2.8 % (ref 0–0.5)
LYMPHOCYTES # BLD AUTO: 1.1 10*3/MM3 (ref 0.7–3.1)
LYMPHOCYTES NFR BLD AUTO: 18.3 % (ref 19.6–45.3)
MCH RBC QN AUTO: 29.7 PG (ref 26.6–33)
MCHC RBC AUTO-ENTMCNC: 33.7 G/DL (ref 31.5–35.7)
MCV RBC AUTO: 87.9 FL (ref 79–97)
MONOCYTES # BLD AUTO: 1.02 10*3/MM3 (ref 0.1–0.9)
MONOCYTES NFR BLD AUTO: 17 % (ref 5–12)
NEUTROPHILS NFR BLD AUTO: 3.52 10*3/MM3 (ref 1.7–7)
NEUTROPHILS NFR BLD AUTO: 58.8 % (ref 42.7–76)
NRBC BLD AUTO-RTO: 0 /100 WBC (ref 0–0.2)
PLATELET # BLD AUTO: 315 10*3/MM3 (ref 140–450)
PMV BLD AUTO: 7.9 FL (ref 6–12)
POTASSIUM SERPL-SCNC: 4.3 MMOL/L (ref 3.5–5.2)
RBC # BLD AUTO: 3.81 10*6/MM3 (ref 4.14–5.8)
SODIUM SERPL-SCNC: 123 MMOL/L (ref 136–145)
SODIUM SERPL-SCNC: 125 MMOL/L (ref 136–145)
SODIUM SERPL-SCNC: 126 MMOL/L (ref 136–145)
SODIUM SERPL-SCNC: 126 MMOL/L (ref 136–145)
WBC NRBC COR # BLD AUTO: 6 10*3/MM3 (ref 3.4–10.8)

## 2024-09-15 PROCEDURE — 99232 SBSQ HOSP IP/OBS MODERATE 35: CPT | Performed by: STUDENT IN AN ORGANIZED HEALTH CARE EDUCATION/TRAINING PROGRAM

## 2024-09-15 PROCEDURE — 84295 ASSAY OF SERUM SODIUM: CPT | Performed by: INTERNAL MEDICINE

## 2024-09-15 PROCEDURE — 85025 COMPLETE CBC W/AUTO DIFF WBC: CPT | Performed by: STUDENT IN AN ORGANIZED HEALTH CARE EDUCATION/TRAINING PROGRAM

## 2024-09-15 PROCEDURE — 80048 BASIC METABOLIC PNL TOTAL CA: CPT | Performed by: STUDENT IN AN ORGANIZED HEALTH CARE EDUCATION/TRAINING PROGRAM

## 2024-09-15 PROCEDURE — 25010000002 ENOXAPARIN PER 10 MG: Performed by: STUDENT IN AN ORGANIZED HEALTH CARE EDUCATION/TRAINING PROGRAM

## 2024-09-15 RX ORDER — ENOXAPARIN SODIUM 100 MG/ML
40 INJECTION SUBCUTANEOUS NIGHTLY
Status: DISCONTINUED | OUTPATIENT
Start: 2024-09-15 | End: 2024-09-16 | Stop reason: HOSPADM

## 2024-09-15 RX ORDER — SODIUM CHLORIDE 1 G/1
1 TABLET ORAL
Status: DISCONTINUED | OUTPATIENT
Start: 2024-09-15 | End: 2024-09-16 | Stop reason: HOSPADM

## 2024-09-15 RX ADMIN — SODIUM CHLORIDE 1 G: 1 TABLET ORAL at 18:35

## 2024-09-15 RX ADMIN — OXYBUTYNIN CHLORIDE 5 MG: 5 TABLET ORAL at 16:25

## 2024-09-15 RX ADMIN — OXYBUTYNIN CHLORIDE 5 MG: 5 TABLET ORAL at 10:16

## 2024-09-15 RX ADMIN — Medication 10 ML: at 10:16

## 2024-09-15 RX ADMIN — SODIUM CHLORIDE 1 G: 1 TABLET ORAL at 13:30

## 2024-09-15 RX ADMIN — OXYBUTYNIN CHLORIDE 5 MG: 5 TABLET ORAL at 22:31

## 2024-09-15 RX ADMIN — Medication 10 ML: at 00:43

## 2024-09-15 RX ADMIN — ENOXAPARIN SODIUM 40 MG: 100 INJECTION SUBCUTANEOUS at 22:31

## 2024-09-15 RX ADMIN — BISACODYL 10 MG: 10 SUPPOSITORY RECTAL at 05:53

## 2024-09-15 RX ADMIN — VANCOMYCIN HYDROCHLORIDE 125 MG: 125 CAPSULE ORAL at 22:31

## 2024-09-15 RX ADMIN — VANCOMYCIN HYDROCHLORIDE 125 MG: 125 CAPSULE ORAL at 10:16

## 2024-09-16 ENCOUNTER — READMISSION MANAGEMENT (OUTPATIENT)
Dept: CALL CENTER | Facility: HOSPITAL | Age: 64
End: 2024-09-16
Payer: MEDICARE

## 2024-09-16 VITALS
BODY MASS INDEX: 25.2 KG/M2 | WEIGHT: 180 LBS | OXYGEN SATURATION: 98 % | HEIGHT: 71 IN | DIASTOLIC BLOOD PRESSURE: 62 MMHG | RESPIRATION RATE: 18 BRPM | HEART RATE: 76 BPM | TEMPERATURE: 98.5 F | SYSTOLIC BLOOD PRESSURE: 130 MMHG

## 2024-09-16 LAB
ANION GAP SERPL CALCULATED.3IONS-SCNC: 7 MMOL/L (ref 5–15)
BUN SERPL-MCNC: 2 MG/DL (ref 8–23)
BUN/CREAT SERPL: 8.7 (ref 7–25)
CALCIUM SPEC-SCNC: 8.2 MG/DL (ref 8.6–10.5)
CHLORIDE SERPL-SCNC: 99 MMOL/L (ref 98–107)
CO2 SERPL-SCNC: 27 MMOL/L (ref 22–29)
CREAT SERPL-MCNC: 0.23 MG/DL (ref 0.76–1.27)
EGFRCR SERPLBLD CKD-EPI 2021: 143.1 ML/MIN/1.73
GLUCOSE SERPL-MCNC: 90 MG/DL (ref 65–99)
POTASSIUM SERPL-SCNC: 4.4 MMOL/L (ref 3.5–5.2)
SODIUM SERPL-SCNC: 126 MMOL/L (ref 136–145)
SODIUM SERPL-SCNC: 133 MMOL/L (ref 136–145)

## 2024-09-16 PROCEDURE — 84295 ASSAY OF SERUM SODIUM: CPT | Performed by: INTERNAL MEDICINE

## 2024-09-16 PROCEDURE — 99239 HOSP IP/OBS DSCHRG MGMT >30: CPT | Performed by: FAMILY MEDICINE

## 2024-09-16 PROCEDURE — 80048 BASIC METABOLIC PNL TOTAL CA: CPT | Performed by: STUDENT IN AN ORGANIZED HEALTH CARE EDUCATION/TRAINING PROGRAM

## 2024-09-16 PROCEDURE — 25010000002 HYDRALAZINE PER 20 MG: Performed by: FAMILY MEDICINE

## 2024-09-16 RX ORDER — SODIUM CHLORIDE 1 G/1
1 TABLET ORAL
Qty: 90 TABLET | Refills: 0 | Status: SHIPPED | OUTPATIENT
Start: 2024-09-16 | End: 2024-10-16

## 2024-09-16 RX ORDER — HYDRALAZINE HYDROCHLORIDE 20 MG/ML
10 INJECTION INTRAMUSCULAR; INTRAVENOUS EVERY 6 HOURS PRN
Status: DISCONTINUED | OUTPATIENT
Start: 2024-09-16 | End: 2024-09-16 | Stop reason: HOSPADM

## 2024-09-16 RX ADMIN — VANCOMYCIN HYDROCHLORIDE 125 MG: 125 CAPSULE ORAL at 08:26

## 2024-09-16 RX ADMIN — SODIUM CHLORIDE 1 G: 1 TABLET ORAL at 08:26

## 2024-09-16 RX ADMIN — HYDRALAZINE HYDROCHLORIDE 10 MG: 20 INJECTION INTRAMUSCULAR; INTRAVENOUS at 06:59

## 2024-09-16 RX ADMIN — OXYBUTYNIN CHLORIDE 5 MG: 5 TABLET ORAL at 08:26

## 2024-09-17 ENCOUNTER — TRANSITIONAL CARE MANAGEMENT TELEPHONE ENCOUNTER (OUTPATIENT)
Dept: CALL CENTER | Facility: HOSPITAL | Age: 64
End: 2024-09-17
Payer: MEDICARE

## 2024-09-19 ENCOUNTER — LAB (OUTPATIENT)
Dept: LAB | Facility: HOSPITAL | Age: 64
End: 2024-09-19
Payer: MEDICARE

## 2024-09-19 ENCOUNTER — TRANSCRIBE ORDERS (OUTPATIENT)
Dept: LAB | Facility: HOSPITAL | Age: 64
End: 2024-09-19
Payer: MEDICARE

## 2024-09-19 DIAGNOSIS — G82.50 QUADRIPLEGIA: ICD-10-CM

## 2024-09-19 DIAGNOSIS — G90.4 AUTONOMIC DYSREFLEXIA: ICD-10-CM

## 2024-09-19 DIAGNOSIS — Z86.19 PERSONAL HISTORY OF UNSPECIFIED INFECTIOUS AND PARASITIC DISEASE: ICD-10-CM

## 2024-09-19 DIAGNOSIS — N39.0 URINARY TRACT INFECTION WITHOUT HEMATURIA, SITE UNSPECIFIED: Primary | ICD-10-CM

## 2024-09-19 DIAGNOSIS — N39.0 URINARY TRACT INFECTION WITHOUT HEMATURIA, SITE UNSPECIFIED: ICD-10-CM

## 2024-09-19 LAB
ALBUMIN SERPL-MCNC: 3.4 G/DL (ref 3.5–5.2)
ALBUMIN/GLOB SERPL: 0.9 G/DL
ALP SERPL-CCNC: 92 U/L (ref 39–117)
ALT SERPL W P-5'-P-CCNC: 15 U/L (ref 1–41)
ANION GAP SERPL CALCULATED.3IONS-SCNC: 14 MMOL/L (ref 5–15)
AST SERPL-CCNC: 20 U/L (ref 1–40)
BASOPHILS # BLD AUTO: 0.14 10*3/MM3 (ref 0–0.2)
BASOPHILS NFR BLD AUTO: 1.7 % (ref 0–1.5)
BILIRUB SERPL-MCNC: 0.4 MG/DL (ref 0–1.2)
BUN SERPL-MCNC: 4 MG/DL (ref 8–23)
BUN/CREAT SERPL: 14.3 (ref 7–25)
CALCIUM SPEC-SCNC: 9.2 MG/DL (ref 8.6–10.5)
CHLORIDE SERPL-SCNC: 94 MMOL/L (ref 98–107)
CO2 SERPL-SCNC: 22 MMOL/L (ref 22–29)
CREAT SERPL-MCNC: 0.28 MG/DL (ref 0.76–1.27)
CRP SERPL-MCNC: 2.67 MG/DL (ref 0–0.5)
DEPRECATED RDW RBC AUTO: 43.8 FL (ref 37–54)
EGFRCR SERPLBLD CKD-EPI 2021: 134.9 ML/MIN/1.73
EOSINOPHIL # BLD AUTO: 0.21 10*3/MM3 (ref 0–0.4)
EOSINOPHIL NFR BLD AUTO: 2.5 % (ref 0.3–6.2)
ERYTHROCYTE [DISTWIDTH] IN BLOOD BY AUTOMATED COUNT: 13.7 % (ref 12.3–15.4)
ERYTHROCYTE [SEDIMENTATION RATE] IN BLOOD: 80 MM/HR (ref 0–20)
GLOBULIN UR ELPH-MCNC: 3.7 GM/DL
GLUCOSE SERPL-MCNC: 97 MG/DL (ref 65–99)
HCT VFR BLD AUTO: 40.4 % (ref 37.5–51)
HGB BLD-MCNC: 13.6 G/DL (ref 13–17.7)
IMM GRANULOCYTES # BLD AUTO: 0.13 10*3/MM3 (ref 0–0.05)
IMM GRANULOCYTES NFR BLD AUTO: 1.5 % (ref 0–0.5)
LYMPHOCYTES # BLD AUTO: 1.47 10*3/MM3 (ref 0.7–3.1)
LYMPHOCYTES NFR BLD AUTO: 17.5 % (ref 19.6–45.3)
MCH RBC QN AUTO: 29.4 PG (ref 26.6–33)
MCHC RBC AUTO-ENTMCNC: 33.7 G/DL (ref 31.5–35.7)
MCV RBC AUTO: 87.4 FL (ref 79–97)
MONOCYTES # BLD AUTO: 1 10*3/MM3 (ref 0.1–0.9)
MONOCYTES NFR BLD AUTO: 11.9 % (ref 5–12)
NEUTROPHILS NFR BLD AUTO: 5.44 10*3/MM3 (ref 1.7–7)
NEUTROPHILS NFR BLD AUTO: 64.9 % (ref 42.7–76)
NRBC BLD AUTO-RTO: 0 /100 WBC (ref 0–0.2)
PLATELET # BLD AUTO: 423 10*3/MM3 (ref 140–450)
PMV BLD AUTO: 7.9 FL (ref 6–12)
POTASSIUM SERPL-SCNC: 4.2 MMOL/L (ref 3.5–5.2)
PROT SERPL-MCNC: 7.1 G/DL (ref 6–8.5)
RBC # BLD AUTO: 4.62 10*6/MM3 (ref 4.14–5.8)
SODIUM SERPL-SCNC: 130 MMOL/L (ref 136–145)
WBC NRBC COR # BLD AUTO: 8.39 10*3/MM3 (ref 3.4–10.8)

## 2024-09-19 PROCEDURE — 80053 COMPREHEN METABOLIC PANEL: CPT

## 2024-09-19 PROCEDURE — 36415 COLL VENOUS BLD VENIPUNCTURE: CPT

## 2024-09-19 PROCEDURE — 86140 C-REACTIVE PROTEIN: CPT

## 2024-09-19 PROCEDURE — 85025 COMPLETE CBC W/AUTO DIFF WBC: CPT

## 2024-09-19 PROCEDURE — 85652 RBC SED RATE AUTOMATED: CPT

## 2024-09-26 ENCOUNTER — LAB (OUTPATIENT)
Dept: LAB | Facility: HOSPITAL | Age: 64
End: 2024-09-26
Payer: MEDICARE

## 2024-09-26 ENCOUNTER — TRANSCRIBE ORDERS (OUTPATIENT)
Dept: LAB | Facility: HOSPITAL | Age: 64
End: 2024-09-26
Payer: MEDICARE

## 2024-09-26 DIAGNOSIS — L89.152 PRESSURE ULCER OF SACRAL REGION, STAGE 2: Primary | ICD-10-CM

## 2024-09-26 DIAGNOSIS — L89.152 PRESSURE ULCER OF SACRAL REGION, STAGE 2: ICD-10-CM

## 2024-09-26 PROCEDURE — 87205 SMEAR GRAM STAIN: CPT

## 2024-09-26 PROCEDURE — 87070 CULTURE OTHR SPECIMN AEROBIC: CPT

## 2024-09-26 PROCEDURE — 87147 CULTURE TYPE IMMUNOLOGIC: CPT

## 2024-09-26 PROCEDURE — 87186 SC STD MICRODIL/AGAR DIL: CPT

## 2024-09-29 LAB
BACTERIA SPEC AEROBE CULT: ABNORMAL
BACTERIA SPEC AEROBE CULT: ABNORMAL
GRAM STN SPEC: ABNORMAL

## 2024-10-08 ENCOUNTER — LAB (OUTPATIENT)
Dept: LAB | Facility: HOSPITAL | Age: 64
End: 2024-10-08
Payer: MEDICARE

## 2024-10-08 ENCOUNTER — TRANSCRIBE ORDERS (OUTPATIENT)
Dept: LAB | Facility: HOSPITAL | Age: 64
End: 2024-10-08
Payer: MEDICARE

## 2024-10-08 DIAGNOSIS — Z16.12 INFECTION DUE TO EXTENDED SPECTRUM BETA-LACTAMASE PRODUCING BACTERIA: ICD-10-CM

## 2024-10-08 DIAGNOSIS — N39.0 URINARY TRACT INFECTION WITHOUT HEMATURIA, SITE UNSPECIFIED: ICD-10-CM

## 2024-10-08 DIAGNOSIS — L08.89 OTHER SPECIFIED LOCAL INFECTIONS OF THE SKIN AND SUBCUTANEOUS TISSUE: Primary | ICD-10-CM

## 2024-10-08 DIAGNOSIS — A49.9 INFECTION DUE TO EXTENDED SPECTRUM BETA-LACTAMASE PRODUCING BACTERIA: ICD-10-CM

## 2024-10-08 DIAGNOSIS — L08.89 OTHER SPECIFIED LOCAL INFECTIONS OF THE SKIN AND SUBCUTANEOUS TISSUE: ICD-10-CM

## 2024-10-08 DIAGNOSIS — B96.29 NON-SHIGA TOXIN-PRODUCING E. COLI: ICD-10-CM

## 2024-10-09 ENCOUNTER — LAB (OUTPATIENT)
Dept: LAB | Facility: HOSPITAL | Age: 64
End: 2024-10-09
Payer: MEDICARE

## 2024-10-09 LAB
ALBUMIN SERPL-MCNC: 3.6 G/DL (ref 3.5–5.2)
ALBUMIN/GLOB SERPL: 1.1 G/DL
ALP SERPL-CCNC: 72 U/L (ref 39–117)
ALT SERPL W P-5'-P-CCNC: 13 U/L (ref 1–41)
ANION GAP SERPL CALCULATED.3IONS-SCNC: 12 MMOL/L (ref 5–15)
AST SERPL-CCNC: 16 U/L (ref 1–40)
BASOPHILS # BLD AUTO: 0.08 10*3/MM3 (ref 0–0.2)
BASOPHILS NFR BLD AUTO: 0.6 % (ref 0–1.5)
BILIRUB SERPL-MCNC: 0.5 MG/DL (ref 0–1.2)
BUN SERPL-MCNC: 9 MG/DL (ref 8–23)
BUN/CREAT SERPL: 23.7 (ref 7–25)
CALCIUM SPEC-SCNC: 9.7 MG/DL (ref 8.6–10.5)
CHLORIDE SERPL-SCNC: 95 MMOL/L (ref 98–107)
CO2 SERPL-SCNC: 25 MMOL/L (ref 22–29)
CREAT SERPL-MCNC: 0.38 MG/DL (ref 0.76–1.27)
CRP SERPL-MCNC: 11.87 MG/DL (ref 0–0.5)
DEPRECATED RDW RBC AUTO: 45.1 FL (ref 37–54)
EGFRCR SERPLBLD CKD-EPI 2021: 123 ML/MIN/1.73
EOSINOPHIL # BLD AUTO: 0.21 10*3/MM3 (ref 0–0.4)
EOSINOPHIL NFR BLD AUTO: 1.5 % (ref 0.3–6.2)
ERYTHROCYTE [DISTWIDTH] IN BLOOD BY AUTOMATED COUNT: 13.8 % (ref 12.3–15.4)
ERYTHROCYTE [SEDIMENTATION RATE] IN BLOOD: 103 MM/HR (ref 0–20)
GLOBULIN UR ELPH-MCNC: 3.4 GM/DL
GLUCOSE SERPL-MCNC: 105 MG/DL (ref 65–99)
HCT VFR BLD AUTO: 37.7 % (ref 37.5–51)
HGB BLD-MCNC: 12.5 G/DL (ref 13–17.7)
IMM GRANULOCYTES # BLD AUTO: 0.07 10*3/MM3 (ref 0–0.05)
IMM GRANULOCYTES NFR BLD AUTO: 0.5 % (ref 0–0.5)
LYMPHOCYTES # BLD AUTO: 1.41 10*3/MM3 (ref 0.7–3.1)
LYMPHOCYTES NFR BLD AUTO: 9.9 % (ref 19.6–45.3)
MCH RBC QN AUTO: 29.2 PG (ref 26.6–33)
MCHC RBC AUTO-ENTMCNC: 33.2 G/DL (ref 31.5–35.7)
MCV RBC AUTO: 88.1 FL (ref 79–97)
MONOCYTES # BLD AUTO: 0.9 10*3/MM3 (ref 0.1–0.9)
MONOCYTES NFR BLD AUTO: 6.3 % (ref 5–12)
NEUTROPHILS NFR BLD AUTO: 11.58 10*3/MM3 (ref 1.7–7)
NEUTROPHILS NFR BLD AUTO: 81.2 % (ref 42.7–76)
NRBC BLD AUTO-RTO: 0 /100 WBC (ref 0–0.2)
PLATELET # BLD AUTO: 526 10*3/MM3 (ref 140–450)
PMV BLD AUTO: 8.3 FL (ref 6–12)
POTASSIUM SERPL-SCNC: 4.1 MMOL/L (ref 3.5–5.2)
PROT SERPL-MCNC: 7 G/DL (ref 6–8.5)
RBC # BLD AUTO: 4.28 10*6/MM3 (ref 4.14–5.8)
SODIUM SERPL-SCNC: 132 MMOL/L (ref 136–145)
WBC NRBC COR # BLD AUTO: 14.25 10*3/MM3 (ref 3.4–10.8)

## 2024-10-09 PROCEDURE — 36415 COLL VENOUS BLD VENIPUNCTURE: CPT

## 2024-10-09 PROCEDURE — 85652 RBC SED RATE AUTOMATED: CPT

## 2024-10-09 PROCEDURE — 86140 C-REACTIVE PROTEIN: CPT

## 2024-10-09 PROCEDURE — 80053 COMPREHEN METABOLIC PANEL: CPT

## 2024-10-09 PROCEDURE — 85025 COMPLETE CBC W/AUTO DIFF WBC: CPT

## 2024-10-10 ENCOUNTER — HOME HEALTH ADMISSION (OUTPATIENT)
Dept: HOME HEALTH SERVICES | Facility: HOME HEALTHCARE | Age: 64
End: 2024-10-10
Payer: MEDICARE

## 2024-10-10 ENCOUNTER — TRANSCRIBE ORDERS (OUTPATIENT)
Dept: HOME HEALTH SERVICES | Facility: HOME HEALTHCARE | Age: 64
End: 2024-10-10
Payer: MEDICARE

## 2024-10-10 DIAGNOSIS — N39.0 URINARY TRACT INFECTION WITHOUT HEMATURIA, SITE UNSPECIFIED: Primary | ICD-10-CM

## 2024-10-11 ENCOUNTER — HOSPITAL ENCOUNTER (OUTPATIENT)
Dept: INFUSION THERAPY | Facility: HOSPITAL | Age: 64
Discharge: HOME | End: 2024-10-11
Payer: MEDICARE

## 2024-10-11 VITALS
TEMPERATURE: 97.9 F | SYSTOLIC BLOOD PRESSURE: 100 MMHG | HEART RATE: 64 BPM | DIASTOLIC BLOOD PRESSURE: 68 MMHG | RESPIRATION RATE: 16 BRPM | OXYGEN SATURATION: 97 %

## 2024-10-11 DIAGNOSIS — L08.89 OTHER SPECIFIED LOCAL INFECTIONS OF THE SKIN AND SUBCUTANEOUS TISSUE: Primary | ICD-10-CM

## 2024-10-11 PROCEDURE — C1751 CATH, INF, PER/CENT/MIDLINE: HCPCS

## 2024-10-11 PROCEDURE — C1894 INTRO/SHEATH, NON-LASER: HCPCS

## 2024-10-11 RX ORDER — SODIUM CHLORIDE 0.9 % (FLUSH) 0.9 %
10 SYRINGE (ML) INJECTION EVERY 12 HOURS SCHEDULED
Status: DISCONTINUED | OUTPATIENT
Start: 2024-10-11 | End: 2024-10-13 | Stop reason: HOSPADM

## 2024-10-11 RX ORDER — SODIUM CHLORIDE 0.9 % (FLUSH) 0.9 %
10 SYRINGE (ML) INJECTION AS NEEDED
Status: DISCONTINUED | OUTPATIENT
Start: 2024-10-11 | End: 2024-10-13 | Stop reason: HOSPADM

## 2024-10-11 RX ORDER — SODIUM CHLORIDE 0.9 % (FLUSH) 0.9 %
20 SYRINGE (ML) INJECTION AS NEEDED
Status: DISCONTINUED | OUTPATIENT
Start: 2024-10-11 | End: 2024-10-13 | Stop reason: HOSPADM

## 2024-10-11 NOTE — NURSING NOTE
SBP rebounding.  Patient remains in reclined position with family at bedside. NS IV bolus infusing via PICC line.   New Single lumen PICC line in place, without hematoma,  dressing CDI

## 2024-10-11 NOTE — NURSING NOTE
VS as noted. NS  1000ml IV bolus completed. Patient states he feels better. Patient had this hypotensive response when previous PICC line was placed and has responded to fluid challenges each time. All belongings with patient. Discharged home with responsible adult.

## 2024-10-11 NOTE — NURSING NOTE
Hypotensive after PICC placement Wheelchair back reclined, head supported. NS 1000ml IV bolus begun.. Skin pale, eyes glassy and unfocused.

## 2024-10-17 ENCOUNTER — TRANSCRIBE ORDERS (OUTPATIENT)
Dept: LAB | Facility: HOSPITAL | Age: 64
End: 2024-10-17
Payer: MEDICARE

## 2024-10-17 ENCOUNTER — LAB (OUTPATIENT)
Dept: LAB | Facility: HOSPITAL | Age: 64
End: 2024-10-17
Payer: MEDICARE

## 2024-10-17 DIAGNOSIS — N30.00 ACUTE CYSTITIS WITHOUT HEMATURIA: ICD-10-CM

## 2024-10-17 DIAGNOSIS — G90.4 AUTONOMIC DYSREFLEXIA: ICD-10-CM

## 2024-10-17 DIAGNOSIS — B96.29 OTHER ESCHERICHIA COLI (E. COLI) AS THE CAUSE OF DISEASES CLASSIFIED ELSEWHERE: ICD-10-CM

## 2024-10-17 DIAGNOSIS — N39.0 URINARY TRACT INFECTION WITHOUT HEMATURIA, SITE UNSPECIFIED: ICD-10-CM

## 2024-10-17 DIAGNOSIS — B95.4 BACTERIAL INFECTION DUE TO STREPTOCOCCUS, GROUP G: ICD-10-CM

## 2024-10-17 DIAGNOSIS — Z16.12: ICD-10-CM

## 2024-10-17 DIAGNOSIS — Z86.19 PERSONAL HISTORY OF UNSPECIFIED INFECTIOUS AND PARASITIC DISEASE: ICD-10-CM

## 2024-10-17 DIAGNOSIS — L08.89 PITTED KERATOLYSIS: Primary | ICD-10-CM

## 2024-10-17 DIAGNOSIS — L08.89 PITTED KERATOLYSIS: ICD-10-CM

## 2024-10-17 LAB
ALBUMIN SERPL-MCNC: 3.2 G/DL (ref 3.5–5.2)
ALBUMIN/GLOB SERPL: 1.1 G/DL
ALP SERPL-CCNC: 57 U/L (ref 39–117)
ALT SERPL W P-5'-P-CCNC: 12 U/L (ref 1–41)
ANION GAP SERPL CALCULATED.3IONS-SCNC: 9 MMOL/L (ref 5–15)
AST SERPL-CCNC: 16 U/L (ref 1–40)
BASOPHILS # BLD AUTO: 0.1 10*3/MM3 (ref 0–0.2)
BASOPHILS NFR BLD AUTO: 1.2 % (ref 0–1.5)
BILIRUB SERPL-MCNC: 0.4 MG/DL (ref 0–1.2)
BUN SERPL-MCNC: 8 MG/DL (ref 8–23)
BUN/CREAT SERPL: 27.6 (ref 7–25)
CALCIUM SPEC-SCNC: 8.8 MG/DL (ref 8.6–10.5)
CHLORIDE SERPL-SCNC: 94 MMOL/L (ref 98–107)
CK SERPL-CCNC: 99 U/L (ref 20–200)
CO2 SERPL-SCNC: 25 MMOL/L (ref 22–29)
CREAT SERPL-MCNC: 0.29 MG/DL (ref 0.76–1.27)
CRP SERPL-MCNC: 4.97 MG/DL (ref 0–0.5)
DEPRECATED RDW RBC AUTO: 45.1 FL (ref 37–54)
EGFRCR SERPLBLD CKD-EPI 2021: 133.4 ML/MIN/1.73
EOSINOPHIL # BLD AUTO: 0.43 10*3/MM3 (ref 0–0.4)
EOSINOPHIL NFR BLD AUTO: 5 % (ref 0.3–6.2)
ERYTHROCYTE [DISTWIDTH] IN BLOOD BY AUTOMATED COUNT: 14 % (ref 12.3–15.4)
ERYTHROCYTE [SEDIMENTATION RATE] IN BLOOD: 71 MM/HR (ref 0–20)
GLOBULIN UR ELPH-MCNC: 3 GM/DL
GLUCOSE SERPL-MCNC: 79 MG/DL (ref 65–99)
HCT VFR BLD AUTO: 30.4 % (ref 37.5–51)
HGB BLD-MCNC: 10.1 G/DL (ref 13–17.7)
IMM GRANULOCYTES # BLD AUTO: 0.03 10*3/MM3 (ref 0–0.05)
IMM GRANULOCYTES NFR BLD AUTO: 0.3 % (ref 0–0.5)
LYMPHOCYTES # BLD AUTO: 1.38 10*3/MM3 (ref 0.7–3.1)
LYMPHOCYTES NFR BLD AUTO: 16 % (ref 19.6–45.3)
MCH RBC QN AUTO: 29.5 PG (ref 26.6–33)
MCHC RBC AUTO-ENTMCNC: 33.2 G/DL (ref 31.5–35.7)
MCV RBC AUTO: 88.9 FL (ref 79–97)
MONOCYTES # BLD AUTO: 0.81 10*3/MM3 (ref 0.1–0.9)
MONOCYTES NFR BLD AUTO: 9.4 % (ref 5–12)
NEUTROPHILS NFR BLD AUTO: 5.9 10*3/MM3 (ref 1.7–7)
NEUTROPHILS NFR BLD AUTO: 68.1 % (ref 42.7–76)
NRBC BLD AUTO-RTO: 0 /100 WBC (ref 0–0.2)
PLATELET # BLD AUTO: 369 10*3/MM3 (ref 140–450)
PMV BLD AUTO: 9.2 FL (ref 6–12)
POTASSIUM SERPL-SCNC: 4.2 MMOL/L (ref 3.5–5.2)
PROT SERPL-MCNC: 6.2 G/DL (ref 6–8.5)
RBC # BLD AUTO: 3.42 10*6/MM3 (ref 4.14–5.8)
SODIUM SERPL-SCNC: 128 MMOL/L (ref 136–145)
WBC NRBC COR # BLD AUTO: 8.65 10*3/MM3 (ref 3.4–10.8)

## 2024-10-17 PROCEDURE — 85652 RBC SED RATE AUTOMATED: CPT

## 2024-10-17 PROCEDURE — 86140 C-REACTIVE PROTEIN: CPT

## 2024-10-17 PROCEDURE — 82550 ASSAY OF CK (CPK): CPT

## 2024-10-17 PROCEDURE — 80053 COMPREHEN METABOLIC PANEL: CPT

## 2024-10-17 PROCEDURE — 36415 COLL VENOUS BLD VENIPUNCTURE: CPT

## 2024-10-17 PROCEDURE — 85025 COMPLETE CBC W/AUTO DIFF WBC: CPT

## 2024-10-28 ENCOUNTER — TRANSCRIBE ORDERS (OUTPATIENT)
Dept: HOME HEALTH SERVICES | Facility: HOME HEALTHCARE | Age: 64
End: 2024-10-28
Payer: MEDICARE

## 2024-10-28 ENCOUNTER — HOME HEALTH ADMISSION (OUTPATIENT)
Dept: HOME HEALTH SERVICES | Facility: HOME HEALTHCARE | Age: 64
End: 2024-10-28
Payer: MEDICARE

## 2024-10-28 DIAGNOSIS — N49.2 ABSCESS OF SCROTUM: Primary | ICD-10-CM

## 2024-10-30 ENCOUNTER — HOME CARE VISIT (OUTPATIENT)
Dept: HOME HEALTH SERVICES | Facility: HOME HEALTHCARE | Age: 64
End: 2024-10-30
Payer: MEDICARE

## 2024-10-30 PROCEDURE — G0299 HHS/HOSPICE OF RN EA 15 MIN: HCPCS

## 2024-10-30 NOTE — Clinical Note
"SOC Note:  Home Health ordered for: disciplines SN     Reason for Hosp/Primary Dx/Co-morbidities: ecoli     Focus of Care: SN for wound care to scrotal area    Patient's goal(s):\"to heal up and stop having dysreflexia\"    Current Functional status/mobility/DME: hospital bed, power wheelchair,     HB status/Living Arrangements: lives alone with paid cg morning and night,     Skin Integrity/wound status: open surgical wound to scrotum     Code Status: full code     Fall Risk/Safety concerns: high fall risk     Educated on Emergency Plan, steps to take prior to going to the ER and when to Call [facility]:  instructed on on call procedure nd on call number posted in home     Medication issues/Concerns: no medication concerns "

## 2024-10-31 VITALS
HEART RATE: 59 BPM | RESPIRATION RATE: 18 BRPM | TEMPERATURE: 98 F | DIASTOLIC BLOOD PRESSURE: 40 MMHG | SYSTOLIC BLOOD PRESSURE: 96 MMHG

## 2024-10-31 NOTE — HOME HEALTH
"SOC Note:  Home Health ordered for: disciplines SN     Reason for Hosp/Primary Dx/Co-morbidities: ecoli     Focus of Care: SN for wound care to scrotal area    Patient's goal(s):\"to heal up and stop having dysreflexia\"    Current Functional status/mobility/DME: hospital bed, power wheelchair,     HB status/Living Arrangements: lives alone with paid cg morning and night,     Skin Integrity/wound status: open surgical wound to scrotum     Code Status: full code     Fall Risk/Safety concerns: high fall risk     Educated on Emergency Plan, steps to take prior to going to the ER and when to Call [facility]:  instructed on on call procedure nd on call number posted in home     Medication issues/Concerns: no medication concerns"

## 2024-11-01 ENCOUNTER — HOME CARE VISIT (OUTPATIENT)
Dept: HOME HEALTH SERVICES | Facility: HOME HEALTHCARE | Age: 64
End: 2024-11-01
Payer: MEDICARE

## 2024-11-01 NOTE — CASE COMMUNICATION
[facility]    Reason: pt to perform wound care .      For your records only.   Per CMS Guidance, MD must be notified of missed/cancelled visits; therefore the prescribed frequency was not met.

## 2024-11-04 ENCOUNTER — HOME CARE VISIT (OUTPATIENT)
Dept: HOME HEALTH SERVICES | Facility: HOME HEALTHCARE | Age: 64
End: 2024-11-04
Payer: MEDICARE

## 2024-11-04 NOTE — CASE COMMUNICATION
Patient missed a [facility] visit from [facility] on 11/4/24    Reason: patient req cancel d/t wound vac not in, however wound vac was denied by Ins. Pt would have to pay OOP.      For your records only.   Per CMS Guidance, MD must be notified of missed/cancelled visits; therefore the prescribed frequency was not met.

## 2024-11-06 ENCOUNTER — HOME CARE VISIT (OUTPATIENT)
Dept: HOME HEALTH SERVICES | Facility: HOME HEALTHCARE | Age: 64
End: 2024-11-06
Payer: MEDICARE

## 2024-11-06 PROCEDURE — G0299 HHS/HOSPICE OF RN EA 15 MIN: HCPCS

## 2024-11-08 ENCOUNTER — HOME CARE VISIT (OUTPATIENT)
Dept: HOME HEALTH SERVICES | Facility: HOME HEALTHCARE | Age: 64
End: 2024-11-08
Payer: MEDICARE

## 2024-11-08 PROCEDURE — G0299 HHS/HOSPICE OF RN EA 15 MIN: HCPCS

## 2024-11-09 VITALS — SYSTOLIC BLOOD PRESSURE: 72 MMHG | DIASTOLIC BLOOD PRESSURE: 44 MMHG

## 2024-11-10 VITALS
TEMPERATURE: 97.6 F | HEART RATE: 77 BPM | SYSTOLIC BLOOD PRESSURE: 100 MMHG | OXYGEN SATURATION: 98 % | DIASTOLIC BLOOD PRESSURE: 55 MMHG | RESPIRATION RATE: 18 BRPM

## 2024-11-15 ENCOUNTER — HOME CARE VISIT (OUTPATIENT)
Dept: HOME HEALTH SERVICES | Facility: HOME HEALTHCARE | Age: 64
End: 2024-11-15
Payer: MEDICARE

## 2024-11-15 NOTE — CASE COMMUNICATION
[facility]    Reason: pt req cancel all appts untile he has reconstructive surgery      For your records only.   Per CMS Guidance, MD must be notified of missed/cancelled visits; therefore the prescribed frequency was not met.

## 2024-11-18 ENCOUNTER — LAB (OUTPATIENT)
Dept: LAB | Facility: HOSPITAL | Age: 64
End: 2024-11-18
Payer: MEDICARE

## 2024-11-18 ENCOUNTER — TRANSCRIBE ORDERS (OUTPATIENT)
Dept: LAB | Facility: HOSPITAL | Age: 64
End: 2024-11-18
Payer: MEDICARE

## 2024-11-18 DIAGNOSIS — Z86.19 PERSONAL HISTORY OF UNSPECIFIED INFECTIOUS AND PARASITIC DISEASE: ICD-10-CM

## 2024-11-18 DIAGNOSIS — G82.50 QUADRIPLEGIA, UNSPECIFIED: ICD-10-CM

## 2024-11-18 DIAGNOSIS — G90.4 AUTONOMIC DYSREFLEXIA: Primary | ICD-10-CM

## 2024-11-18 DIAGNOSIS — N49.2 ABSCESS OF SCROTUM: ICD-10-CM

## 2024-11-18 DIAGNOSIS — B95.2 URINARY TRACT INFECTION DUE TO ENTEROCOCCUS: ICD-10-CM

## 2024-11-18 DIAGNOSIS — Z16.20 RESISTANCE TO ANTIBIOTIC: ICD-10-CM

## 2024-11-18 DIAGNOSIS — N39.0 URINARY TRACT INFECTION WITHOUT HEMATURIA, SITE UNSPECIFIED: ICD-10-CM

## 2024-11-18 DIAGNOSIS — N39.0 URINARY TRACT INFECTION DUE TO ENTEROCOCCUS: ICD-10-CM

## 2024-11-18 DIAGNOSIS — N45.1 EPIDIDYMITIS WITHOUT ABSCESS: ICD-10-CM

## 2024-11-18 DIAGNOSIS — G90.4 AUTONOMIC DYSREFLEXIA: ICD-10-CM

## 2024-11-18 LAB
ALBUMIN SERPL-MCNC: 3.6 G/DL (ref 3.5–5.2)
ALBUMIN/GLOB SERPL: 1.1 G/DL
ALP SERPL-CCNC: 69 U/L (ref 39–117)
ALT SERPL W P-5'-P-CCNC: 10 U/L (ref 1–41)
ANION GAP SERPL CALCULATED.3IONS-SCNC: 13 MMOL/L (ref 5–15)
AST SERPL-CCNC: 14 U/L (ref 1–40)
BASOPHILS # BLD AUTO: 0.11 10*3/MM3 (ref 0–0.2)
BASOPHILS NFR BLD AUTO: 0.8 % (ref 0–1.5)
BILIRUB SERPL-MCNC: 0.4 MG/DL (ref 0–1.2)
BUN SERPL-MCNC: 10 MG/DL (ref 8–23)
BUN/CREAT SERPL: 27 (ref 7–25)
CALCIUM SPEC-SCNC: 9.5 MG/DL (ref 8.6–10.5)
CHLORIDE SERPL-SCNC: 93 MMOL/L (ref 98–107)
CO2 SERPL-SCNC: 25 MMOL/L (ref 22–29)
CREAT SERPL-MCNC: 0.37 MG/DL (ref 0.76–1.27)
CRP SERPL-MCNC: 6.86 MG/DL (ref 0–0.5)
DEPRECATED RDW RBC AUTO: 50.6 FL (ref 37–54)
EGFRCR SERPLBLD CKD-EPI 2021: 124 ML/MIN/1.73
EOSINOPHIL # BLD AUTO: 0.36 10*3/MM3 (ref 0–0.4)
EOSINOPHIL NFR BLD AUTO: 2.5 % (ref 0.3–6.2)
ERYTHROCYTE [DISTWIDTH] IN BLOOD BY AUTOMATED COUNT: 15.4 % (ref 12.3–15.4)
GLOBULIN UR ELPH-MCNC: 3.4 GM/DL
GLUCOSE SERPL-MCNC: 116 MG/DL (ref 65–99)
HCT VFR BLD AUTO: 34.2 % (ref 37.5–51)
HGB BLD-MCNC: 11 G/DL (ref 13–17.7)
IMM GRANULOCYTES # BLD AUTO: 0.19 10*3/MM3 (ref 0–0.05)
IMM GRANULOCYTES NFR BLD AUTO: 1.3 % (ref 0–0.5)
LYMPHOCYTES # BLD AUTO: 1.79 10*3/MM3 (ref 0.7–3.1)
LYMPHOCYTES NFR BLD AUTO: 12.7 % (ref 19.6–45.3)
MCH RBC QN AUTO: 29.2 PG (ref 26.6–33)
MCHC RBC AUTO-ENTMCNC: 32.2 G/DL (ref 31.5–35.7)
MCV RBC AUTO: 90.7 FL (ref 79–97)
MONOCYTES # BLD AUTO: 1.52 10*3/MM3 (ref 0.1–0.9)
MONOCYTES NFR BLD AUTO: 10.7 % (ref 5–12)
NEUTROPHILS NFR BLD AUTO: 10.18 10*3/MM3 (ref 1.7–7)
NEUTROPHILS NFR BLD AUTO: 72 % (ref 42.7–76)
NRBC BLD AUTO-RTO: 0 /100 WBC (ref 0–0.2)
PLATELET # BLD AUTO: 481 10*3/MM3 (ref 140–450)
PMV BLD AUTO: 9 FL (ref 6–12)
POTASSIUM SERPL-SCNC: 4.7 MMOL/L (ref 3.5–5.2)
PROT SERPL-MCNC: 7 G/DL (ref 6–8.5)
RBC # BLD AUTO: 3.77 10*6/MM3 (ref 4.14–5.8)
SODIUM SERPL-SCNC: 131 MMOL/L (ref 136–145)
WBC NRBC COR # BLD AUTO: 14.15 10*3/MM3 (ref 3.4–10.8)

## 2024-11-18 PROCEDURE — 36415 COLL VENOUS BLD VENIPUNCTURE: CPT

## 2024-11-18 PROCEDURE — 86140 C-REACTIVE PROTEIN: CPT

## 2024-11-18 PROCEDURE — 80053 COMPREHEN METABOLIC PANEL: CPT

## 2024-11-18 PROCEDURE — 85025 COMPLETE CBC W/AUTO DIFF WBC: CPT

## 2024-11-21 ENCOUNTER — HOME CARE VISIT (OUTPATIENT)
Dept: HOME HEALTH SERVICES | Facility: HOME HEALTHCARE | Age: 64
End: 2024-11-21
Payer: MEDICARE

## 2024-11-21 NOTE — CASE COMMUNICATION
Patient missed a SN visit from [facility] on 11/21/24.     Reason: HOSPITALIZED AT .       For your records only.   Per CMS Guidance, MD must be notified of missed/cancelled visits; therefore the prescribed frequency was not met.

## 2024-11-22 ENCOUNTER — HOME CARE VISIT (OUTPATIENT)
Dept: HOME HEALTH SERVICES | Facility: HOME HEALTHCARE | Age: 64
End: 2024-11-22
Payer: MEDICARE

## 2024-11-25 ENCOUNTER — TRANSCRIBE ORDERS (OUTPATIENT)
Dept: HOME HEALTH SERVICES | Facility: HOME HEALTHCARE | Age: 64
End: 2024-11-25
Payer: MEDICARE

## 2024-11-25 ENCOUNTER — READMISSION MANAGEMENT (OUTPATIENT)
Dept: CALL CENTER | Facility: HOSPITAL | Age: 64
End: 2024-11-25
Payer: MEDICARE

## 2024-11-25 DIAGNOSIS — N49.2 SCROTAL ABSCESS: Primary | ICD-10-CM

## 2024-11-25 NOTE — OUTREACH NOTE
Prep Survey      Flowsheet Row Responses   [facility] facility patient discharged from? Non-BH   Is LACE score < 7 ? 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 Other   Prep survey completed? Yes            Sarina CAO - Registered Nurse

## 2024-11-26 ENCOUNTER — TRANSITIONAL CARE MANAGEMENT TELEPHONE ENCOUNTER (OUTPATIENT)
Dept: CALL CENTER | Facility: HOSPITAL | Age: 64
End: 2024-11-26
Payer: MEDICARE

## 2024-11-26 ENCOUNTER — HOME CARE VISIT (OUTPATIENT)
Dept: HOME HEALTH SERVICES | Facility: HOME HEALTHCARE | Age: 64
End: 2024-11-26
Payer: MEDICARE

## 2024-11-26 PROCEDURE — G0299 HHS/HOSPICE OF RN EA 15 MIN: HCPCS

## 2024-11-26 NOTE — OUTREACH NOTE
Call Center TCM Note      Flowsheet Row Responses   [facility] patient discharged from? Non-  []   Does the patient have one of the following disease processes/diagnoses(primary or secondary)? Other   TCM attempt successful? 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 Yes   Is the patient having any side effects they believe may be caused by any medication additions or changes? No   Does the patient have all medications ordered at discharge? Yes   Is the patient taking all medications as directed (includes completed medication regime)? Yes   Comments Patient has a hospital followup scheduled with PCP Dr. Garcia on 11/27. He was discharged from [facility].   Does the patient have an appointment with their PCP within 7-14 days of discharge? Yes   Psychosocial issues? No   Did the patient receive a copy of their discharge instructions? Yes   Nursing interventions Reviewed instructions with patient   What is the patient's perception of their health status since discharge? Improving   Is the patient/caregiver able to teach back signs and symptoms related to disease process for when to call PCP? Yes   Is the patient/caregiver able to teach back signs and symptoms related to disease process for when to call 911? Yes   Is the patient/caregiver able to teach back the hierarchy of who to call/visit for symptoms/problems? PCP, Specialist, Home health nurse, Urgent Care, ED, 911 Yes   If the patient is a current smoker, are they able to teach back resources for cessation? Not a smoker   TCM call completed? Yes   Wrap up additional comments No needs at this time,    Call end time 1130   Would this patient benefit from a Referral to [facility] Social Work? No   Is the patient interested in additional calls from an ambulatory ? No            Steven Moeller RN    11/26/2024, 11:30 EST

## 2024-11-27 ENCOUNTER — OFFICE VISIT (OUTPATIENT)
Dept: FAMILY MEDICINE CLINIC | Facility: CLINIC | Age: 64
End: 2024-11-27
Payer: MEDICARE

## 2024-11-27 VITALS
RESPIRATION RATE: 17 BRPM | HEART RATE: 84 BPM | OXYGEN SATURATION: 98 % | SYSTOLIC BLOOD PRESSURE: 104 MMHG | DIASTOLIC BLOOD PRESSURE: 80 MMHG

## 2024-11-27 DIAGNOSIS — N36.0 URETHROCUTANEOUS FISTULA IN MALE: ICD-10-CM

## 2024-11-27 DIAGNOSIS — Z09 HOSPITAL DISCHARGE FOLLOW-UP: Primary | ICD-10-CM

## 2024-11-27 PROCEDURE — 1111F DSCHRG MED/CURRENT MED MERGE: CPT | Performed by: FAMILY MEDICINE

## 2024-11-27 PROCEDURE — 99495 TRANSJ CARE MGMT MOD F2F 14D: CPT | Performed by: FAMILY MEDICINE

## 2024-11-27 RX ORDER — OXYCODONE HYDROCHLORIDE 5 MG/1
5 TABLET ORAL EVERY 8 HOURS PRN
Qty: 90 TABLET | Refills: 0 | Status: SHIPPED | OUTPATIENT
Start: 2024-11-27
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
## 2024-11-28 VITALS
TEMPERATURE: 97.8 F | RESPIRATION RATE: 16 BRPM | HEART RATE: 54 BPM | DIASTOLIC BLOOD PRESSURE: 59 MMHG | SYSTOLIC BLOOD PRESSURE: 88 MMHG | OXYGEN SATURATION: 100 %

## 2024-11-29 ENCOUNTER — HOME CARE VISIT (OUTPATIENT)
Dept: HOME HEALTH SERVICES | Facility: HOME HEALTHCARE | Age: 64
End: 2024-11-29
Payer: MEDICARE

## 2024-11-29 NOTE — HOME HEALTH
Resumption of Care Note:     Reason for hospitalization/new problems: patient hospitalized for planned SP cath insertion. Patient has an 18fr 10ml indwelling SP cath     BRE [place] Findings:    New/changed medications: no longer taking vancomycin    New/changed orders: SP cath to be changed in urology office on 1/3/24. wound care to scrotal and penile wounds include packing twice daily with dry gauze    Educated on Emergency Plan, steps to take prior to going to the ER and when to Call [facility]:  yes    Plan/Focus of Care and Skilled need: wound care and assessment of scrotal abscess wound and penile fistula

## 2024-11-29 NOTE — CASE COMMUNICATION
Resumption of Care Note:     Reason for hospitalization/new problems: patient hospitalized for planned SP cath insertion. Patient has an 18fr 10ml indwelling SP cath     BRE [place] Findings:    New/changed medications: no longer taking vancomycin    New/changed orders: SP cath to be changed in urology office on 1/3/24. wound care to scrotal and penile wounds include packing twice daily with dry gauze    Educated on Emergency Plan, steps  to take prior to going to the ER and when to Call [facility]:  yes    Plan/Focus of Care and Skilled need: wound care and assessment of scrotal abscess wound and penile fistula

## 2024-11-30 NOTE — CASE COMMUNICATION
Patient missed a SN visit from [facility] on 11/29/24.     Reason: Alternate caregiver present on this day and caregiver there during visit hours unable to transfer to bed for wound care. Patient requesting trying again Monday when normal caregiver is present after holiday      For your records only.   Per CMS Guidance, MD must be notified of missed/cancelled visits; therefore the prescribed frequency was not met.

## 2024-12-02 ENCOUNTER — HOME CARE VISIT (OUTPATIENT)
Dept: HOME HEALTH SERVICES | Facility: HOME HEALTHCARE | Age: 64
End: 2024-12-02
Payer: MEDICARE

## 2024-12-02 VITALS
TEMPERATURE: 97.6 F | HEART RATE: 63 BPM | RESPIRATION RATE: 18 BRPM | OXYGEN SATURATION: 99 % | SYSTOLIC BLOOD PRESSURE: 105 MMHG | DIASTOLIC BLOOD PRESSURE: 68 MMHG

## 2024-12-02 PROCEDURE — G0299 HHS/HOSPICE OF RN EA 15 MIN: HCPCS

## 2024-12-05 ENCOUNTER — HOME CARE VISIT (OUTPATIENT)
Dept: HOME HEALTH SERVICES | Facility: HOME HEALTHCARE | Age: 64
End: 2024-12-05
Payer: MEDICARE

## 2024-12-05 PROCEDURE — G0299 HHS/HOSPICE OF RN EA 15 MIN: HCPCS

## 2024-12-05 NOTE — Clinical Note
they don't want it packed?    ----- Message -----  From: Anju Amato LPN  Sent: 12/5/2024   2:44 PM EST  To: Ban Osbron RN      Called Dr Duarte  urology at , no packing in the scrotum wound.

## 2024-12-05 NOTE — Clinical Note
in the penis wound  ----- Message -----  From: Ban Osborn RN  Sent: 12/6/2024  11:22 AM EST  To: Anju Amato LPN  Subject: RE:                                                they don't want it packed?    ----- Message -----  From: Anju Amato LPN  Sent: 12/5/2024   2:44 PM EST  To: Ban Osborn RN      Called Dr Duarte  urology at , no packing in the scrotum wound.

## 2024-12-09 ENCOUNTER — HOME CARE VISIT (OUTPATIENT)
Dept: HOME HEALTH SERVICES | Facility: HOME HEALTHCARE | Age: 64
End: 2024-12-09
Payer: MEDICARE

## 2024-12-09 VITALS
TEMPERATURE: 97.1 F | OXYGEN SATURATION: 99 % | DIASTOLIC BLOOD PRESSURE: 68 MMHG | RESPIRATION RATE: 18 BRPM | SYSTOLIC BLOOD PRESSURE: 124 MMHG | HEART RATE: 78 BPM

## 2024-12-09 VITALS
RESPIRATION RATE: 18 BRPM | HEART RATE: 78 BPM | SYSTOLIC BLOOD PRESSURE: 126 MMHG | DIASTOLIC BLOOD PRESSURE: 68 MMHG | TEMPERATURE: 97.6 F | OXYGEN SATURATION: 96 %

## 2024-12-09 PROCEDURE — G0299 HHS/HOSPICE OF RN EA 15 MIN: HCPCS

## 2024-12-11 ENCOUNTER — LAB REQUISITION (OUTPATIENT)
Dept: LAB | Facility: HOSPITAL | Age: 64
End: 2024-12-11
Payer: MEDICARE

## 2024-12-11 ENCOUNTER — HOME CARE VISIT (OUTPATIENT)
Dept: HOME HEALTH SERVICES | Facility: HOME HEALTHCARE | Age: 64
End: 2024-12-11
Payer: MEDICARE

## 2024-12-11 VITALS
HEART RATE: 73 BPM | SYSTOLIC BLOOD PRESSURE: 121 MMHG | DIASTOLIC BLOOD PRESSURE: 77 MMHG | TEMPERATURE: 98 F | OXYGEN SATURATION: 99 % | RESPIRATION RATE: 18 BRPM

## 2024-12-11 DIAGNOSIS — N45.4 ABSCESS OF EPIDIDYMIS OR TESTIS: ICD-10-CM

## 2024-12-11 PROCEDURE — 87186 SC STD MICRODIL/AGAR DIL: CPT | Performed by: INTERNAL MEDICINE

## 2024-12-11 PROCEDURE — 87077 CULTURE AEROBIC IDENTIFY: CPT | Performed by: INTERNAL MEDICINE

## 2024-12-11 PROCEDURE — 87070 CULTURE OTHR SPECIMN AEROBIC: CPT | Performed by: INTERNAL MEDICINE

## 2024-12-11 PROCEDURE — 87205 SMEAR GRAM STAIN: CPT | Performed by: INTERNAL MEDICINE

## 2024-12-11 PROCEDURE — G0299 HHS/HOSPICE OF RN EA 15 MIN: HCPCS

## 2024-12-15 LAB
BACTERIA SPEC AEROBE CULT: ABNORMAL
GRAM STN SPEC: ABNORMAL

## 2024-12-16 ENCOUNTER — HOME CARE VISIT (OUTPATIENT)
Dept: HOME HEALTH SERVICES | Facility: HOME HEALTHCARE | Age: 64
End: 2024-12-16
Payer: MEDICARE

## 2024-12-16 NOTE — CASE COMMUNICATION
Patient missed a SN visit from [facility] on 12/16/24.     Reason: HOSPITALIZED AT .       For your records only.   Per CMS Guidance, MD must be notified of missed/cancelled visits; therefore the prescribed frequency was not met.

## 2024-12-18 ENCOUNTER — HOME CARE VISIT (OUTPATIENT)
Dept: HOME HEALTH SERVICES | Facility: HOME HEALTHCARE | Age: 64
End: 2024-12-18
Payer: MEDICARE

## 2024-12-19 ENCOUNTER — TRANSCRIBE ORDERS (OUTPATIENT)
Dept: HOME HEALTH SERVICES | Facility: HOME HEALTHCARE | Age: 64
End: 2024-12-19
Payer: MEDICARE

## 2024-12-19 DIAGNOSIS — Z45.2 FITTING AND ADJUSTMENT OF VASCULAR CATHETER: Primary | ICD-10-CM

## 2024-12-23 ENCOUNTER — READMISSION MANAGEMENT (OUTPATIENT)
Dept: CALL CENTER | Facility: HOSPITAL | Age: 64
End: 2024-12-23
Payer: MEDICARE

## 2024-12-23 ENCOUNTER — HOME CARE VISIT (OUTPATIENT)
Dept: HOME HEALTH SERVICES | Facility: HOME HEALTHCARE | Age: 64
End: 2024-12-23
Payer: MEDICARE

## 2024-12-23 NOTE — OUTREACH NOTE
Prep Survey      Flowsheet Row Responses   [facility] facility patient discharged from? Non-BH   Is LACE score < 7 ? Non-BH Discharge   Eligibility [facility]   Date of Admission 12/15/24   Date of Discharge 12/23/24   Discharge diagnosis Cellulitis of left buttock   Does the patient have one of the following disease processes/diagnoses(primary or secondary)? Other   Prep survey completed? Yes            Jennifer NICHOLE - Registered Nurse

## 2024-12-24 ENCOUNTER — TRANSITIONAL CARE MANAGEMENT TELEPHONE ENCOUNTER (OUTPATIENT)
Dept: CALL CENTER | Facility: HOSPITAL | Age: 64
End: 2024-12-24
Payer: MEDICARE

## 2024-12-24 ENCOUNTER — HOME CARE VISIT (OUTPATIENT)
Dept: HOME HEALTH SERVICES | Facility: HOME HEALTHCARE | Age: 64
End: 2024-12-24
Payer: MEDICARE

## 2024-12-24 PROCEDURE — G0299 HHS/HOSPICE OF RN EA 15 MIN: HCPCS

## 2024-12-24 NOTE — OUTREACH NOTE
Call Center TCM Note      Flowsheet Row Responses   [facility] facility patient discharged from? Non-  []   Does the patient have one of the following disease processes/diagnoses(primary or secondary)? Other   TCM attempt successful? No   Unsuccessful attempts Attempt 2            Jovanna Garcia RN    12/24/2024, 13:27 EST

## 2024-12-24 NOTE — CASE COMMUNICATION
Patient missed a SN visit from [facility] on 12/23/24.     Reason: STILL HOSPITALIZED AT .       For your records only.   Per CMS Guidance, MD must be notified of missed/cancelled visits; therefore the prescribed frequency was not met.

## 2024-12-24 NOTE — Clinical Note
Resumption of Care Note: SN services resumed this date s/p hospital from 12/15/24 thru 12/23/24. SNV frequency 2w9. Patient agreeable to HH services this date. Please schedule SNVs on Monday/Friday. Friday, 12/27/24 SNV will need to be a Re-cert for SN. RN only d/t PICC line care/dressing changes.    Reason for hospitalization/new problems: Cellulitis left buttock, perineal abscess, urethrocutaneous fistula.    BRE [place] Findings: Patient has nephrostomy tubes to left and right kidney, PICC line RUE, and unobservable wound to scrotum/left buttock.    New/changed medications: IV Zosyn 18 grams continuous. Cubicin 650 mg IV push every 123 hours w/stop date of 1/29/25.    New/changed orders: Weekly PICC line dressing changes, Weekly lab draws: CBC w/diff, CR, BUN, LFT, CRP, ESR and fax to Dr. Szymanski at [phone]. Wound care/dressing changes to right and left nephrostomy tubes, Wound care/dressing changes to scrotum and left buttock.    Educated on Emergency Plan, steps to take prior to going to the ER and when to Call [facility]:  Yes.    Plan/Focus of Care and Skilled need: Weekly PICC line dressing changes, weekly lab draws, wound care/dressing changes to scrotum/left buttock, nephrostomy care/dressing changes, post-op education, medication education, pain management, home safety and fall prevention.

## 2024-12-24 NOTE — OUTREACH NOTE
Call Center TCM Note      Flowsheet Row Responses   [facility] facility patient discharged from? Non-  []   Does the patient have one of the following disease processes/diagnoses(primary or secondary)? Other   TCM attempt successful? No   Unsuccessful attempts Attempt 1            Jovanna Garcia RN    12/24/2024, 13:04 EST

## 2024-12-25 VITALS
RESPIRATION RATE: 20 BRPM | DIASTOLIC BLOOD PRESSURE: 80 MMHG | BODY MASS INDEX: 24.27 KG/M2 | WEIGHT: 174 LBS | SYSTOLIC BLOOD PRESSURE: 102 MMHG

## 2024-12-25 NOTE — HOME HEALTH
Resumption of Care Note: SN services resumed this date s/p hospital from 12/15/24 thru 12/23/24. SNV frequency 2w9. Patient agreeable to HH services this date. Please schedule SNVs on Monday/Friday. Friday, 12/27/24 SNV will need to be a Re-cert for SN. RN only d/t PICC line care/dressing changes.    Reason for hospitalization/new problems: Cellulitis left buttock, perineal abscess, urethrocutaneous fistula.    BRE [place] Findings: Patient has nephrostomy tubes to left and right kidney, PICC line RUE, and unobservable wound to scrotum/left buttock.    New/changed medications: IV Zosyn 18 grams continuous. Cubicin 650 mg IV push every 123 hours w/stop date of 1/29/25.    New/changed orders: Weekly PICC line dressing changes, Weekly lab draws: CBC w/diff, CR, BUN, LFT, CRP, ESR and fax to Dr. Szymanski at [phone]. Wound care/dressing changes to right and left nephrostomy tubes, Wound care/dressing changes to scrotum and left buttock.    Educated on Emergency Plan, steps to take prior to going to the ER and when to Call [facility]:  Yes.    Plan/Focus of Care and Skilled need: Weekly PICC line dressing changes, weekly lab draws, wound care/dressing changes to scrotum/left buttock, nephrostomy care/dressing changes, post-op education, medication education, pain management, home safety and fall prevention.

## 2024-12-26 ENCOUNTER — TRANSITIONAL CARE MANAGEMENT TELEPHONE ENCOUNTER (OUTPATIENT)
Dept: CALL CENTER | Facility: HOSPITAL | Age: 64
End: 2024-12-26
Payer: MEDICARE

## 2024-12-26 NOTE — OUTREACH NOTE
Call Center TCM Note      Flowsheet Row Responses   [facility] patient discharged from? Non-  [[facility]]   Does the patient have one of the following disease processes/diagnoses(primary or secondary)? Other   TCM attempt successful? Yes   Call start time 1105   Call end time 1110   General alerts for this patient paraplegia   Discharge diagnosis Cellulitis of left buttock   Medication alerts for this patient IV antbiotic   Does the patient have all medications ordered at discharge? Yes   Is the patient taking all medications as directed (includes completed medication regime)? Yes   Comments states he wants to wait a bit before scheduling hospital follow up appt with   Does the patient have an appointment with their PCP within 7-14 days of discharge? No   Nursing Interventions Patient desires to follow up with specialty only   What is the Home health agency?  [facility]   Has home health visited the patient within 72 hours of discharge? Yes   Home health comments  was there to see patient on 12/24   Psychosocial issues? No   What is the patient's perception of their health status since discharge? Improving   Is the patient/caregiver able to teach back signs and symptoms related to disease process for when to call PCP? Yes   Is the patient/caregiver able to teach back signs and symptoms related to disease process for when to call 911? Yes   Is the patient/caregiver able to teach back the hierarchy of who to call/visit for symptoms/problems? PCP, Specialist, Home health nurse, Urgent Care, ED, 911 Yes   If the patient is a current smoker, are they able to teach back resources for cessation? Not a smoker   TCM call completed? Yes   Wrap up additional comments Doing well, denies any questions or concerns at this time, states he wants to wait to follow up with PCP at this time.   Call end time 1110   Would this patient benefit from a Referral to [facility] Social Work? No   Is the patient interested in additional calls  from an ambulatory ? No            Jovanna Garcia RN    12/26/2024, 11:11 EST

## 2024-12-27 ENCOUNTER — HOME CARE VISIT (OUTPATIENT)
Dept: HOME HEALTH SERVICES | Facility: HOME HEALTHCARE | Age: 64
End: 2024-12-27
Payer: MEDICARE

## 2024-12-27 PROCEDURE — G0299 HHS/HOSPICE OF RN EA 15 MIN: HCPCS

## 2024-12-29 VITALS
TEMPERATURE: 97.9 F | DIASTOLIC BLOOD PRESSURE: 58 MMHG | RESPIRATION RATE: 18 BRPM | OXYGEN SATURATION: 99 % | SYSTOLIC BLOOD PRESSURE: 115 MMHG | HEART RATE: 79 BPM

## 2024-12-30 ENCOUNTER — HOME CARE VISIT (OUTPATIENT)
Dept: HOME HEALTH SERVICES | Facility: HOME HEALTHCARE | Age: 64
End: 2024-12-30
Payer: MEDICARE

## 2024-12-30 VITALS
RESPIRATION RATE: 16 BRPM | SYSTOLIC BLOOD PRESSURE: 116 MMHG | HEART RATE: 70 BPM | TEMPERATURE: 97.6 F | DIASTOLIC BLOOD PRESSURE: 71 MMHG | OXYGEN SATURATION: 100 %

## 2024-12-30 PROCEDURE — G0299 HHS/HOSPICE OF RN EA 15 MIN: HCPCS

## 2024-12-31 ENCOUNTER — LAB REQUISITION (OUTPATIENT)
Dept: LAB | Facility: HOSPITAL | Age: 64
End: 2024-12-31
Payer: MEDICARE

## 2024-12-31 ENCOUNTER — HOME CARE VISIT (OUTPATIENT)
Dept: HOME HEALTH SERVICES | Facility: HOME HEALTHCARE | Age: 64
End: 2024-12-31
Payer: MEDICARE

## 2024-12-31 DIAGNOSIS — N45.4 ABSCESS OF EPIDIDYMIS OR TESTIS: ICD-10-CM

## 2024-12-31 LAB
ALBUMIN SERPL-MCNC: 3 G/DL (ref 3.5–5.2)
ALP SERPL-CCNC: 45 U/L (ref 39–117)
ALT SERPL W P-5'-P-CCNC: 7 U/L (ref 1–41)
AST SERPL-CCNC: 12 U/L (ref 1–40)
BASOPHILS # BLD AUTO: 0.11 10*3/MM3 (ref 0–0.2)
BASOPHILS NFR BLD AUTO: 1.1 % (ref 0–1.5)
BILIRUB CONJ SERPL-MCNC: <0.2 MG/DL (ref 0–0.3)
BILIRUB INDIRECT SERPL-MCNC: ABNORMAL MG/DL
BILIRUB SERPL-MCNC: 0.2 MG/DL (ref 0–1.2)
BUN SERPL-MCNC: 15 MG/DL (ref 8–23)
CK SERPL-CCNC: 56 U/L (ref 20–200)
CREAT SERPL-MCNC: 0.36 MG/DL (ref 0.76–1.27)
CRP SERPL-MCNC: 3.79 MG/DL (ref 0.01–0.5)
DEPRECATED RDW RBC AUTO: 50.2 FL (ref 37–54)
EGFRCR SERPLBLD CKD-EPI 2021: 124.2 ML/MIN/1.73
EOSINOPHIL # BLD AUTO: 0.78 10*3/MM3 (ref 0–0.4)
EOSINOPHIL NFR BLD AUTO: 7.8 % (ref 0.3–6.2)
ERYTHROCYTE [DISTWIDTH] IN BLOOD BY AUTOMATED COUNT: 15 % (ref 12.3–15.4)
ERYTHROCYTE [SEDIMENTATION RATE] IN BLOOD: 81 MM/HR (ref 0–20)
HCT VFR BLD AUTO: 26.5 % (ref 37.5–51)
HGB BLD-MCNC: 8.5 G/DL (ref 13–17.7)
IMM GRANULOCYTES # BLD AUTO: 0.04 10*3/MM3 (ref 0–0.05)
IMM GRANULOCYTES NFR BLD AUTO: 0.4 % (ref 0–0.5)
LYMPHOCYTES # BLD AUTO: 1.25 10*3/MM3 (ref 0.7–3.1)
LYMPHOCYTES NFR BLD AUTO: 12.4 % (ref 19.6–45.3)
MCH RBC QN AUTO: 29.3 PG (ref 26.6–33)
MCHC RBC AUTO-ENTMCNC: 32.1 G/DL (ref 31.5–35.7)
MCV RBC AUTO: 91.4 FL (ref 79–97)
MONOCYTES # BLD AUTO: 0.64 10*3/MM3 (ref 0.1–0.9)
MONOCYTES NFR BLD AUTO: 6.4 % (ref 5–12)
NEUTROPHILS NFR BLD AUTO: 7.24 10*3/MM3 (ref 1.7–7)
NEUTROPHILS NFR BLD AUTO: 71.9 % (ref 42.7–76)
NRBC BLD AUTO-RTO: 0 /100 WBC (ref 0–0.2)
PLATELET # BLD AUTO: 441 10*3/MM3 (ref 140–450)
PMV BLD AUTO: 8.5 FL (ref 6–12)
PROT SERPL-MCNC: 5.9 G/DL (ref 6–8.5)
RBC # BLD AUTO: 2.9 10*6/MM3 (ref 4.14–5.8)
WBC NRBC COR # BLD AUTO: 10.06 10*3/MM3 (ref 3.4–10.8)

## 2024-12-31 PROCEDURE — 86141 C-REACTIVE PROTEIN HS: CPT | Performed by: INTERNAL MEDICINE

## 2024-12-31 PROCEDURE — 85025 COMPLETE CBC W/AUTO DIFF WBC: CPT | Performed by: INTERNAL MEDICINE

## 2024-12-31 PROCEDURE — 82565 ASSAY OF CREATININE: CPT | Performed by: INTERNAL MEDICINE

## 2024-12-31 PROCEDURE — 84520 ASSAY OF UREA NITROGEN: CPT | Performed by: INTERNAL MEDICINE

## 2024-12-31 PROCEDURE — 82550 ASSAY OF CK (CPK): CPT | Performed by: INTERNAL MEDICINE

## 2024-12-31 PROCEDURE — 85652 RBC SED RATE AUTOMATED: CPT | Performed by: INTERNAL MEDICINE

## 2024-12-31 PROCEDURE — 80076 HEPATIC FUNCTION PANEL: CPT | Performed by: INTERNAL MEDICINE

## 2024-12-31 PROCEDURE — G0299 HHS/HOSPICE OF RN EA 15 MIN: HCPCS

## 2025-01-01 VITALS
RESPIRATION RATE: 18 BRPM | HEART RATE: 74 BPM | TEMPERATURE: 98.1 F | SYSTOLIC BLOOD PRESSURE: 118 MMHG | DIASTOLIC BLOOD PRESSURE: 76 MMHG | OXYGEN SATURATION: 99 %

## 2025-01-02 ENCOUNTER — HOME CARE VISIT (OUTPATIENT)
Dept: HOME HEALTH SERVICES | Facility: HOME HEALTHCARE | Age: 65
End: 2025-01-02
Payer: MEDICARE

## 2025-01-02 VITALS — HEART RATE: 64 BPM | OXYGEN SATURATION: 97 % | TEMPERATURE: 99.7 F

## 2025-01-02 PROCEDURE — G0162 HHC RN E&M PLAN SVS, 15 MIN: HCPCS

## 2025-01-05 ENCOUNTER — HOME CARE VISIT (OUTPATIENT)
Dept: HOME HEALTH SERVICES | Facility: HOME HEALTHCARE | Age: 65
End: 2025-01-05
Payer: MEDICARE

## 2025-01-07 ENCOUNTER — HOME CARE VISIT (OUTPATIENT)
Dept: HOME HEALTH SERVICES | Facility: HOME HEALTHCARE | Age: 65
End: 2025-01-07
Payer: MEDICARE

## 2025-01-07 ENCOUNTER — LAB REQUISITION (OUTPATIENT)
Dept: LAB | Facility: HOSPITAL | Age: 65
End: 2025-01-07
Payer: MEDICARE

## 2025-01-07 VITALS
SYSTOLIC BLOOD PRESSURE: 128 MMHG | OXYGEN SATURATION: 98 % | RESPIRATION RATE: 17 BRPM | TEMPERATURE: 98.1 F | DIASTOLIC BLOOD PRESSURE: 76 MMHG | HEART RATE: 68 BPM

## 2025-01-07 DIAGNOSIS — M86.68 OTHER CHRONIC OSTEOMYELITIS, OTHER SITE: ICD-10-CM

## 2025-01-07 DIAGNOSIS — N45.4 ABSCESS OF EPIDIDYMIS OR TESTIS: ICD-10-CM

## 2025-01-07 LAB
ALBUMIN SERPL-MCNC: 3.5 G/DL (ref 3.5–5.2)
ALP SERPL-CCNC: 49 U/L (ref 39–117)
ALT SERPL W P-5'-P-CCNC: 10 U/L (ref 1–41)
AST SERPL-CCNC: 19 U/L (ref 1–40)
BASOPHILS # BLD AUTO: 0.11 10*3/MM3 (ref 0–0.2)
BASOPHILS NFR BLD AUTO: 1.1 % (ref 0–1.5)
BILIRUB CONJ SERPL-MCNC: <0.2 MG/DL (ref 0–0.3)
BILIRUB INDIRECT SERPL-MCNC: NORMAL MG/DL
BILIRUB SERPL-MCNC: 0.2 MG/DL (ref 0–1.2)
BUN SERPL-MCNC: 15 MG/DL (ref 8–23)
CK SERPL-CCNC: 100 U/L (ref 20–200)
CREAT SERPL-MCNC: 0.32 MG/DL (ref 0.76–1.27)
CRP SERPL-MCNC: 3.15 MG/DL (ref 0–0.5)
DEPRECATED RDW RBC AUTO: 55.8 FL (ref 37–54)
EGFRCR SERPLBLD CKD-EPI 2021: 128.7 ML/MIN/1.73
EOSINOPHIL # BLD AUTO: 1 10*3/MM3 (ref 0–0.4)
EOSINOPHIL NFR BLD AUTO: 10.2 % (ref 0.3–6.2)
ERYTHROCYTE [DISTWIDTH] IN BLOOD BY AUTOMATED COUNT: 16.3 % (ref 12.3–15.4)
ERYTHROCYTE [SEDIMENTATION RATE] IN BLOOD: 76 MM/HR (ref 0–20)
HCT VFR BLD AUTO: 29 % (ref 37.5–51)
HGB BLD-MCNC: 9.2 G/DL (ref 13–17.7)
IMM GRANULOCYTES # BLD AUTO: 0.04 10*3/MM3 (ref 0–0.05)
IMM GRANULOCYTES NFR BLD AUTO: 0.4 % (ref 0–0.5)
LYMPHOCYTES # BLD AUTO: 1.12 10*3/MM3 (ref 0.7–3.1)
LYMPHOCYTES NFR BLD AUTO: 11.5 % (ref 19.6–45.3)
MCH RBC QN AUTO: 29.5 PG (ref 26.6–33)
MCHC RBC AUTO-ENTMCNC: 31.7 G/DL (ref 31.5–35.7)
MCV RBC AUTO: 92.9 FL (ref 79–97)
MONOCYTES # BLD AUTO: 0.83 10*3/MM3 (ref 0.1–0.9)
MONOCYTES NFR BLD AUTO: 8.5 % (ref 5–12)
NEUTROPHILS NFR BLD AUTO: 6.67 10*3/MM3 (ref 1.7–7)
NEUTROPHILS NFR BLD AUTO: 68.3 % (ref 42.7–76)
NRBC BLD AUTO-RTO: 0 /100 WBC (ref 0–0.2)
PLATELET # BLD AUTO: 369 10*3/MM3 (ref 140–450)
PMV BLD AUTO: 8.9 FL (ref 6–12)
PROT SERPL-MCNC: 6.6 G/DL (ref 6–8.5)
RBC # BLD AUTO: 3.12 10*6/MM3 (ref 4.14–5.8)
WBC NRBC COR # BLD AUTO: 9.77 10*3/MM3 (ref 3.4–10.8)

## 2025-01-07 PROCEDURE — 84520 ASSAY OF UREA NITROGEN: CPT | Performed by: INTERNAL MEDICINE

## 2025-01-07 PROCEDURE — 80076 HEPATIC FUNCTION PANEL: CPT | Performed by: INTERNAL MEDICINE

## 2025-01-07 PROCEDURE — 86140 C-REACTIVE PROTEIN: CPT | Performed by: INTERNAL MEDICINE

## 2025-01-07 PROCEDURE — G0299 HHS/HOSPICE OF RN EA 15 MIN: HCPCS

## 2025-01-07 PROCEDURE — 85025 COMPLETE CBC W/AUTO DIFF WBC: CPT | Performed by: INTERNAL MEDICINE

## 2025-01-07 PROCEDURE — 85652 RBC SED RATE AUTOMATED: CPT | Performed by: INTERNAL MEDICINE

## 2025-01-07 PROCEDURE — 82565 ASSAY OF CREATININE: CPT | Performed by: INTERNAL MEDICINE

## 2025-01-07 PROCEDURE — 82550 ASSAY OF CK (CPK): CPT | Performed by: INTERNAL MEDICINE

## 2025-01-09 ENCOUNTER — HOME CARE VISIT (OUTPATIENT)
Dept: HOME HEALTH SERVICES | Facility: HOME HEALTHCARE | Age: 65
End: 2025-01-09
Payer: MEDICARE

## 2025-01-09 VITALS
OXYGEN SATURATION: 100 % | RESPIRATION RATE: 18 BRPM | TEMPERATURE: 98.1 F | SYSTOLIC BLOOD PRESSURE: 110 MMHG | DIASTOLIC BLOOD PRESSURE: 71 MMHG | HEART RATE: 80 BPM

## 2025-01-09 PROCEDURE — G0299 HHS/HOSPICE OF RN EA 15 MIN: HCPCS

## 2025-01-13 ENCOUNTER — HOME CARE VISIT (OUTPATIENT)
Dept: HOME HEALTH SERVICES | Facility: HOME HEALTHCARE | Age: 65
End: 2025-01-13
Payer: MEDICARE

## 2025-01-13 ENCOUNTER — LAB REQUISITION (OUTPATIENT)
Dept: LAB | Facility: HOSPITAL | Age: 65
End: 2025-01-13
Payer: MEDICARE

## 2025-01-13 VITALS
TEMPERATURE: 97.9 F | OXYGEN SATURATION: 99 % | SYSTOLIC BLOOD PRESSURE: 108 MMHG | HEART RATE: 80 BPM | DIASTOLIC BLOOD PRESSURE: 72 MMHG | RESPIRATION RATE: 18 BRPM

## 2025-01-13 DIAGNOSIS — N45.4 ABSCESS OF EPIDIDYMIS OR TESTIS: ICD-10-CM

## 2025-01-13 LAB
ALBUMIN SERPL-MCNC: 3.3 G/DL (ref 3.5–5.2)
ALP SERPL-CCNC: 51 U/L (ref 39–117)
ALT SERPL W P-5'-P-CCNC: 10 U/L (ref 1–41)
AST SERPL-CCNC: 18 U/L (ref 1–40)
BASOPHILS # BLD AUTO: 0.07 10*3/MM3 (ref 0–0.2)
BASOPHILS NFR BLD AUTO: 0.9 % (ref 0–1.5)
BILIRUB CONJ SERPL-MCNC: <0.2 MG/DL (ref 0–0.3)
BILIRUB INDIRECT SERPL-MCNC: ABNORMAL MG/DL
BILIRUB SERPL-MCNC: 0.2 MG/DL (ref 0–1.2)
BUN SERPL-MCNC: 13 MG/DL (ref 8–23)
CK SERPL-CCNC: 77 U/L (ref 20–200)
CREAT SERPL-MCNC: 0.39 MG/DL (ref 0.76–1.27)
CRP SERPL-MCNC: 6.18 MG/DL (ref 0–0.5)
DEPRECATED RDW RBC AUTO: 54.7 FL (ref 37–54)
EGFRCR SERPLBLD CKD-EPI 2021: 121.3 ML/MIN/1.73
EOSINOPHIL # BLD AUTO: 0.73 10*3/MM3 (ref 0–0.4)
EOSINOPHIL NFR BLD AUTO: 9.7 % (ref 0.3–6.2)
ERYTHROCYTE [DISTWIDTH] IN BLOOD BY AUTOMATED COUNT: 16 % (ref 12.3–15.4)
ERYTHROCYTE [SEDIMENTATION RATE] IN BLOOD: 81 MM/HR (ref 0–20)
HCT VFR BLD AUTO: 29.5 % (ref 37.5–51)
HGB BLD-MCNC: 9.4 G/DL (ref 13–17.7)
IMM GRANULOCYTES # BLD AUTO: 0.02 10*3/MM3 (ref 0–0.05)
IMM GRANULOCYTES NFR BLD AUTO: 0.3 % (ref 0–0.5)
LYMPHOCYTES # BLD AUTO: 0.77 10*3/MM3 (ref 0.7–3.1)
LYMPHOCYTES NFR BLD AUTO: 10.2 % (ref 19.6–45.3)
MCH RBC QN AUTO: 29.7 PG (ref 26.6–33)
MCHC RBC AUTO-ENTMCNC: 31.9 G/DL (ref 31.5–35.7)
MCV RBC AUTO: 93.1 FL (ref 79–97)
MONOCYTES # BLD AUTO: 1.07 10*3/MM3 (ref 0.1–0.9)
MONOCYTES NFR BLD AUTO: 14.2 % (ref 5–12)
NEUTROPHILS NFR BLD AUTO: 4.9 10*3/MM3 (ref 1.7–7)
NEUTROPHILS NFR BLD AUTO: 64.7 % (ref 42.7–76)
NRBC BLD AUTO-RTO: 0 /100 WBC (ref 0–0.2)
PLATELET # BLD AUTO: 255 10*3/MM3 (ref 140–450)
PMV BLD AUTO: 9.3 FL (ref 6–12)
PROT SERPL-MCNC: 6.4 G/DL (ref 6–8.5)
RBC # BLD AUTO: 3.17 10*6/MM3 (ref 4.14–5.8)
WBC NRBC COR # BLD AUTO: 7.56 10*3/MM3 (ref 3.4–10.8)

## 2025-01-13 PROCEDURE — 84520 ASSAY OF UREA NITROGEN: CPT | Performed by: INTERNAL MEDICINE

## 2025-01-13 PROCEDURE — 85652 RBC SED RATE AUTOMATED: CPT | Performed by: INTERNAL MEDICINE

## 2025-01-13 PROCEDURE — 85025 COMPLETE CBC W/AUTO DIFF WBC: CPT | Performed by: INTERNAL MEDICINE

## 2025-01-13 PROCEDURE — 86140 C-REACTIVE PROTEIN: CPT | Performed by: INTERNAL MEDICINE

## 2025-01-13 PROCEDURE — G0299 HHS/HOSPICE OF RN EA 15 MIN: HCPCS

## 2025-01-13 PROCEDURE — 80076 HEPATIC FUNCTION PANEL: CPT | Performed by: INTERNAL MEDICINE

## 2025-01-13 PROCEDURE — 82565 ASSAY OF CREATININE: CPT | Performed by: INTERNAL MEDICINE

## 2025-01-13 PROCEDURE — 82550 ASSAY OF CK (CPK): CPT | Performed by: INTERNAL MEDICINE

## 2025-01-15 ENCOUNTER — OFFICE VISIT (OUTPATIENT)
Dept: FAMILY MEDICINE CLINIC | Facility: CLINIC | Age: 65
End: 2025-01-15
Payer: MEDICARE

## 2025-01-15 ENCOUNTER — TELEPHONE (OUTPATIENT)
Dept: FAMILY MEDICINE CLINIC | Facility: CLINIC | Age: 65
End: 2025-01-15

## 2025-01-15 VITALS
SYSTOLIC BLOOD PRESSURE: 118 MMHG | HEART RATE: 82 BPM | OXYGEN SATURATION: 98 % | HEIGHT: 71 IN | BODY MASS INDEX: 24.36 KG/M2 | DIASTOLIC BLOOD PRESSURE: 80 MMHG | WEIGHT: 174 LBS

## 2025-01-15 DIAGNOSIS — Z09 HOSPITAL DISCHARGE FOLLOW-UP: ICD-10-CM

## 2025-01-15 DIAGNOSIS — R52 SEVERE PAIN: ICD-10-CM

## 2025-01-15 DIAGNOSIS — N36.0 URETHROCUTANEOUS FISTULA IN MALE: Primary | ICD-10-CM

## 2025-01-15 RX ORDER — PIPERACILLIN SODIUM, TAZOBACTAM SODIUM 3; .375 G/15ML; G/15ML
INJECTION, POWDER, LYOPHILIZED, FOR SOLUTION INTRAVENOUS
COMMUNITY
Start: 2024-12-18 | End: 2025-01-29

## 2025-01-15 RX ORDER — HYDROMORPHONE HYDROCHLORIDE 2 MG/1
2 TABLET ORAL EVERY 4 HOURS PRN
Qty: 180 TABLET | Refills: 0 | Status: SHIPPED | OUTPATIENT
Start: 2025-01-15 | End: 2025-01-15 | Stop reason: ALTCHOICE

## 2025-01-15 RX ORDER — MIDODRINE HYDROCHLORIDE 10 MG/1
10 TABLET ORAL
COMMUNITY
Start: 2024-12-19 | End: 2025-01-18

## 2025-01-15 RX ORDER — OXYCODONE HYDROCHLORIDE 10 MG/1
10 TABLET ORAL EVERY 6 HOURS PRN
Qty: 120 TABLET | Refills: 0 | Status: SHIPPED | OUTPATIENT
Start: 2025-01-15

## 2025-01-15 NOTE — PATIENT INSTRUCTIONS
Start with no more than 4 dilaudid 2mg in 24 hours (8mg total dose). May need to increase to 12mg (6 times per day) over then next month.
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
## 2025-01-16 ENCOUNTER — HOME CARE VISIT (OUTPATIENT)
Dept: HOME HEALTH SERVICES | Facility: HOME HEALTHCARE | Age: 65
End: 2025-01-16
Payer: MEDICARE

## 2025-01-16 NOTE — CASE COMMUNICATION
I have received verbal order from Dr. Szymanski's office to obtain labs from Mr. Toledo's PICC line starting next week due to difficult stick and limited range of motion.

## 2025-01-20 ENCOUNTER — LAB REQUISITION (OUTPATIENT)
Dept: LAB | Facility: HOSPITAL | Age: 65
End: 2025-01-20
Payer: MEDICARE

## 2025-01-20 ENCOUNTER — HOME CARE VISIT (OUTPATIENT)
Dept: HOME HEALTH SERVICES | Facility: HOME HEALTHCARE | Age: 65
End: 2025-01-20
Payer: MEDICARE

## 2025-01-20 DIAGNOSIS — N49.2 INFLAMMATORY DISORDERS OF SCROTUM: ICD-10-CM

## 2025-01-20 LAB
ALBUMIN SERPL-MCNC: 3.6 G/DL (ref 3.5–5.2)
ALBUMIN/GLOB SERPL: 1.1 G/DL
ALP SERPL-CCNC: 70 U/L (ref 39–117)
ALT SERPL W P-5'-P-CCNC: 12 U/L (ref 1–41)
ANION GAP SERPL CALCULATED.3IONS-SCNC: 10 MMOL/L (ref 5–15)
AST SERPL-CCNC: 16 U/L (ref 1–40)
BASOPHILS # BLD AUTO: 0.07 10*3/MM3 (ref 0–0.2)
BASOPHILS NFR BLD AUTO: 0.9 % (ref 0–1.5)
BILIRUB SERPL-MCNC: 0.2 MG/DL (ref 0–1.2)
BUN SERPL-MCNC: 14 MG/DL (ref 8–23)
BUN/CREAT SERPL: 41.2 (ref 7–25)
CALCIUM SPEC-SCNC: 9.5 MG/DL (ref 8.6–10.5)
CHLORIDE SERPL-SCNC: 96 MMOL/L (ref 98–107)
CO2 SERPL-SCNC: 28 MMOL/L (ref 22–29)
CREAT SERPL-MCNC: 0.34 MG/DL (ref 0.76–1.27)
DEPRECATED RDW RBC AUTO: 52 FL (ref 37–54)
EGFRCR SERPLBLD CKD-EPI 2021: 126.4 ML/MIN/1.73
EOSINOPHIL # BLD AUTO: 0.98 10*3/MM3 (ref 0–0.4)
EOSINOPHIL NFR BLD AUTO: 12.2 % (ref 0.3–6.2)
ERYTHROCYTE [DISTWIDTH] IN BLOOD BY AUTOMATED COUNT: 15.6 % (ref 12.3–15.4)
GLOBULIN UR ELPH-MCNC: 3.3 GM/DL
GLUCOSE SERPL-MCNC: 113 MG/DL (ref 65–99)
HCT VFR BLD AUTO: 30.3 % (ref 37.5–51)
HGB BLD-MCNC: 9.8 G/DL (ref 13–17.7)
IMM GRANULOCYTES # BLD AUTO: 0.05 10*3/MM3 (ref 0–0.05)
IMM GRANULOCYTES NFR BLD AUTO: 0.6 % (ref 0–0.5)
LYMPHOCYTES # BLD AUTO: 1.21 10*3/MM3 (ref 0.7–3.1)
LYMPHOCYTES NFR BLD AUTO: 15.1 % (ref 19.6–45.3)
MCH RBC QN AUTO: 29.6 PG (ref 26.6–33)
MCHC RBC AUTO-ENTMCNC: 32.3 G/DL (ref 31.5–35.7)
MCV RBC AUTO: 91.5 FL (ref 79–97)
MONOCYTES # BLD AUTO: 0.93 10*3/MM3 (ref 0.1–0.9)
MONOCYTES NFR BLD AUTO: 11.6 % (ref 5–12)
NEUTROPHILS NFR BLD AUTO: 4.77 10*3/MM3 (ref 1.7–7)
NEUTROPHILS NFR BLD AUTO: 59.6 % (ref 42.7–76)
NRBC BLD AUTO-RTO: 0 /100 WBC (ref 0–0.2)
PLATELET # BLD AUTO: 351 10*3/MM3 (ref 140–450)
PMV BLD AUTO: 10.6 FL (ref 6–12)
POTASSIUM SERPL-SCNC: 4.1 MMOL/L (ref 3.5–5.2)
PROT SERPL-MCNC: 6.9 G/DL (ref 6–8.5)
RBC # BLD AUTO: 3.31 10*6/MM3 (ref 4.14–5.8)
SODIUM SERPL-SCNC: 134 MMOL/L (ref 136–145)
WBC NRBC COR # BLD AUTO: 8.01 10*3/MM3 (ref 3.4–10.8)

## 2025-01-20 PROCEDURE — 80053 COMPREHEN METABOLIC PANEL: CPT | Performed by: INTERNAL MEDICINE

## 2025-01-20 PROCEDURE — 82550 ASSAY OF CK (CPK): CPT | Performed by: INTERNAL MEDICINE

## 2025-01-20 PROCEDURE — G0299 HHS/HOSPICE OF RN EA 15 MIN: HCPCS

## 2025-01-20 PROCEDURE — 85025 COMPLETE CBC W/AUTO DIFF WBC: CPT | Performed by: INTERNAL MEDICINE

## 2025-01-20 PROCEDURE — 86140 C-REACTIVE PROTEIN: CPT | Performed by: INTERNAL MEDICINE

## 2025-01-20 PROCEDURE — 85652 RBC SED RATE AUTOMATED: CPT | Performed by: INTERNAL MEDICINE

## 2025-01-21 LAB
CRP SERPL-MCNC: 7.75 MG/DL (ref 0–0.5)
ERYTHROCYTE [SEDIMENTATION RATE] IN BLOOD: 76 MM/HR (ref 0–20)

## 2025-01-22 LAB — CK SERPL-CCNC: 38 U/L (ref 20–200)

## 2025-01-23 VITALS
RESPIRATION RATE: 18 BRPM | SYSTOLIC BLOOD PRESSURE: 105 MMHG | HEART RATE: 69 BPM | OXYGEN SATURATION: 98 % | DIASTOLIC BLOOD PRESSURE: 73 MMHG | TEMPERATURE: 97.6 F

## 2025-01-27 ENCOUNTER — HOME CARE VISIT (OUTPATIENT)
Dept: HOME HEALTH SERVICES | Facility: HOME HEALTHCARE | Age: 65
End: 2025-01-27
Payer: MEDICARE

## 2025-01-27 ENCOUNTER — LAB REQUISITION (OUTPATIENT)
Dept: LAB | Facility: HOSPITAL | Age: 65
End: 2025-01-27
Payer: MEDICARE

## 2025-01-27 VITALS
SYSTOLIC BLOOD PRESSURE: 94 MMHG | TEMPERATURE: 97.9 F | RESPIRATION RATE: 18 BRPM | DIASTOLIC BLOOD PRESSURE: 51 MMHG | HEART RATE: 84 BPM | OXYGEN SATURATION: 99 %

## 2025-01-27 DIAGNOSIS — N49.2 INFLAMMATORY DISORDERS OF SCROTUM: ICD-10-CM

## 2025-01-27 LAB
ALBUMIN SERPL-MCNC: 3.7 G/DL (ref 3.5–5.2)
ALP SERPL-CCNC: 67 U/L (ref 39–117)
ALT SERPL W P-5'-P-CCNC: 16 U/L (ref 1–41)
AST SERPL-CCNC: 20 U/L (ref 1–40)
BASOPHILS # BLD AUTO: 0.14 10*3/MM3 (ref 0–0.2)
BASOPHILS NFR BLD AUTO: 1.4 % (ref 0–1.5)
BILIRUB CONJ SERPL-MCNC: <0.2 MG/DL (ref 0–0.3)
BILIRUB INDIRECT SERPL-MCNC: NORMAL MG/DL
BILIRUB SERPL-MCNC: 0.2 MG/DL (ref 0–1.2)
BUN SERPL-MCNC: 11 MG/DL (ref 8–23)
CK SERPL-CCNC: 48 U/L (ref 20–200)
CREAT SERPL-MCNC: 0.34 MG/DL (ref 0.76–1.27)
CRP SERPL-MCNC: 8.17 MG/DL (ref 0–0.5)
DEPRECATED RDW RBC AUTO: 50 FL (ref 37–54)
EGFRCR SERPLBLD CKD-EPI 2021: 126.4 ML/MIN/1.73
EOSINOPHIL # BLD AUTO: 0.63 10*3/MM3 (ref 0–0.4)
EOSINOPHIL NFR BLD AUTO: 6.3 % (ref 0.3–6.2)
ERYTHROCYTE [DISTWIDTH] IN BLOOD BY AUTOMATED COUNT: 15.2 % (ref 12.3–15.4)
ERYTHROCYTE [SEDIMENTATION RATE] IN BLOOD: 83 MM/HR (ref 0–20)
HCT VFR BLD AUTO: 30.4 % (ref 37.5–51)
HGB BLD-MCNC: 9.6 G/DL (ref 13–17.7)
IMM GRANULOCYTES # BLD AUTO: 0.03 10*3/MM3 (ref 0–0.05)
IMM GRANULOCYTES NFR BLD AUTO: 0.3 % (ref 0–0.5)
LYMPHOCYTES # BLD AUTO: 1.42 10*3/MM3 (ref 0.7–3.1)
LYMPHOCYTES NFR BLD AUTO: 14.1 % (ref 19.6–45.3)
MCH RBC QN AUTO: 28.6 PG (ref 26.6–33)
MCHC RBC AUTO-ENTMCNC: 31.6 G/DL (ref 31.5–35.7)
MCV RBC AUTO: 90.5 FL (ref 79–97)
MONOCYTES # BLD AUTO: 1.57 10*3/MM3 (ref 0.1–0.9)
MONOCYTES NFR BLD AUTO: 15.6 % (ref 5–12)
NEUTROPHILS NFR BLD AUTO: 6.25 10*3/MM3 (ref 1.7–7)
NEUTROPHILS NFR BLD AUTO: 62.3 % (ref 42.7–76)
NRBC BLD AUTO-RTO: 0 /100 WBC (ref 0–0.2)
PLATELET # BLD AUTO: 419 10*3/MM3 (ref 140–450)
PMV BLD AUTO: 9 FL (ref 6–12)
PROT SERPL-MCNC: 6.9 G/DL (ref 6–8.5)
RBC # BLD AUTO: 3.36 10*6/MM3 (ref 4.14–5.8)
WBC NRBC COR # BLD AUTO: 10.04 10*3/MM3 (ref 3.4–10.8)

## 2025-01-27 PROCEDURE — 80076 HEPATIC FUNCTION PANEL: CPT | Performed by: INTERNAL MEDICINE

## 2025-01-27 PROCEDURE — 85652 RBC SED RATE AUTOMATED: CPT | Performed by: INTERNAL MEDICINE

## 2025-01-27 PROCEDURE — 86140 C-REACTIVE PROTEIN: CPT | Performed by: INTERNAL MEDICINE

## 2025-01-27 PROCEDURE — 85025 COMPLETE CBC W/AUTO DIFF WBC: CPT | Performed by: INTERNAL MEDICINE

## 2025-01-27 PROCEDURE — 82550 ASSAY OF CK (CPK): CPT | Performed by: INTERNAL MEDICINE

## 2025-01-27 PROCEDURE — 84520 ASSAY OF UREA NITROGEN: CPT | Performed by: INTERNAL MEDICINE

## 2025-01-27 PROCEDURE — 82565 ASSAY OF CREATININE: CPT | Performed by: INTERNAL MEDICINE

## 2025-01-27 PROCEDURE — G0299 HHS/HOSPICE OF RN EA 15 MIN: HCPCS

## 2025-02-03 ENCOUNTER — HOME CARE VISIT (OUTPATIENT)
Dept: HOME HEALTH SERVICES | Facility: HOME HEALTHCARE | Age: 65
End: 2025-02-03
Payer: MEDICARE

## 2025-02-03 ENCOUNTER — LAB REQUISITION (OUTPATIENT)
Dept: LAB | Facility: HOSPITAL | Age: 65
End: 2025-02-03
Payer: MEDICARE

## 2025-02-03 DIAGNOSIS — N48.21 ABSCESS OF CORPUS CAVERNOSUM AND PENIS: ICD-10-CM

## 2025-02-03 LAB
ALBUMIN SERPL-MCNC: 3.5 G/DL (ref 3.5–5.2)
ALP SERPL-CCNC: 69 U/L (ref 39–117)
ALT SERPL W P-5'-P-CCNC: 16 U/L (ref 1–41)
AST SERPL-CCNC: 24 U/L (ref 1–40)
BASOPHILS # BLD AUTO: 0.08 10*3/MM3 (ref 0–0.2)
BASOPHILS NFR BLD AUTO: 1.1 % (ref 0–1.5)
BILIRUB CONJ SERPL-MCNC: 0.1 MG/DL (ref 0–0.3)
BILIRUB INDIRECT SERPL-MCNC: 0.1 MG/DL
BILIRUB SERPL-MCNC: 0.2 MG/DL (ref 0–1.2)
BUN SERPL-MCNC: 10 MG/DL (ref 8–23)
CK SERPL-CCNC: 73 U/L (ref 20–200)
CREAT SERPL-MCNC: 0.27 MG/DL (ref 0.76–1.27)
CRP SERPL-MCNC: 11.71 MG/DL (ref 0–0.5)
DEPRECATED RDW RBC AUTO: 48.5 FL (ref 37–54)
EGFRCR SERPLBLD CKD-EPI 2021: 135.5 ML/MIN/1.73
EOSINOPHIL # BLD AUTO: 0.29 10*3/MM3 (ref 0–0.4)
EOSINOPHIL NFR BLD AUTO: 3.9 % (ref 0.3–6.2)
ERYTHROCYTE [DISTWIDTH] IN BLOOD BY AUTOMATED COUNT: 14.8 % (ref 12.3–15.4)
ERYTHROCYTE [SEDIMENTATION RATE] IN BLOOD: 86 MM/HR (ref 0–20)
HCT VFR BLD AUTO: 30 % (ref 37.5–51)
HGB BLD-MCNC: 9.3 G/DL (ref 13–17.7)
IMM GRANULOCYTES # BLD AUTO: 0.02 10*3/MM3 (ref 0–0.05)
IMM GRANULOCYTES NFR BLD AUTO: 0.3 % (ref 0–0.5)
LYMPHOCYTES # BLD AUTO: 0.67 10*3/MM3 (ref 0.7–3.1)
LYMPHOCYTES NFR BLD AUTO: 9 % (ref 19.6–45.3)
MCH RBC QN AUTO: 27.6 PG (ref 26.6–33)
MCHC RBC AUTO-ENTMCNC: 31 G/DL (ref 31.5–35.7)
MCV RBC AUTO: 89 FL (ref 79–97)
MONOCYTES # BLD AUTO: 0.78 10*3/MM3 (ref 0.1–0.9)
MONOCYTES NFR BLD AUTO: 10.5 % (ref 5–12)
NEUTROPHILS NFR BLD AUTO: 5.57 10*3/MM3 (ref 1.7–7)
NEUTROPHILS NFR BLD AUTO: 75.2 % (ref 42.7–76)
NRBC BLD AUTO-RTO: 0 /100 WBC (ref 0–0.2)
PLAT MORPH BLD: NORMAL
PLATELET # BLD AUTO: 384 10*3/MM3 (ref 140–450)
PMV BLD AUTO: 9.6 FL (ref 6–12)
PROT SERPL-MCNC: 6 G/DL (ref 6–8.5)
RBC # BLD AUTO: 3.37 10*6/MM3 (ref 4.14–5.8)
RBC MORPH BLD: NORMAL
WBC MORPH BLD: NORMAL
WBC NRBC COR # BLD AUTO: 7.41 10*3/MM3 (ref 3.4–10.8)

## 2025-02-03 PROCEDURE — 84520 ASSAY OF UREA NITROGEN: CPT

## 2025-02-03 PROCEDURE — 80076 HEPATIC FUNCTION PANEL: CPT

## 2025-02-03 PROCEDURE — G0299 HHS/HOSPICE OF RN EA 15 MIN: HCPCS

## 2025-02-03 PROCEDURE — 85652 RBC SED RATE AUTOMATED: CPT

## 2025-02-03 PROCEDURE — 85025 COMPLETE CBC W/AUTO DIFF WBC: CPT

## 2025-02-03 PROCEDURE — 86140 C-REACTIVE PROTEIN: CPT

## 2025-02-03 PROCEDURE — 85007 BL SMEAR W/DIFF WBC COUNT: CPT

## 2025-02-03 PROCEDURE — 82550 ASSAY OF CK (CPK): CPT

## 2025-02-03 PROCEDURE — 82565 ASSAY OF CREATININE: CPT

## 2025-02-04 VITALS
HEART RATE: 62 BPM | DIASTOLIC BLOOD PRESSURE: 56 MMHG | OXYGEN SATURATION: 98 % | SYSTOLIC BLOOD PRESSURE: 106 MMHG | RESPIRATION RATE: 17 BRPM | TEMPERATURE: 98.5 F

## 2025-02-10 ENCOUNTER — LAB REQUISITION (OUTPATIENT)
Dept: LAB | Facility: HOSPITAL | Age: 65
End: 2025-02-10
Payer: MEDICARE

## 2025-02-10 ENCOUNTER — HOME CARE VISIT (OUTPATIENT)
Dept: HOME HEALTH SERVICES | Facility: HOME HEALTHCARE | Age: 65
End: 2025-02-10
Payer: MEDICARE

## 2025-02-10 DIAGNOSIS — N49.2 INFLAMMATORY DISORDERS OF SCROTUM: ICD-10-CM

## 2025-02-10 LAB
ALBUMIN SERPL-MCNC: 3.6 G/DL (ref 3.5–5.2)
ALBUMIN SERPL-MCNC: 3.6 G/DL (ref 3.5–5.2)
ALBUMIN/GLOB SERPL: 1.4 G/DL
ALP SERPL-CCNC: 62 U/L (ref 39–117)
ALP SERPL-CCNC: 62 U/L (ref 39–117)
ALT SERPL W P-5'-P-CCNC: 17 U/L (ref 1–41)
ALT SERPL W P-5'-P-CCNC: 17 U/L (ref 1–41)
ANION GAP SERPL CALCULATED.3IONS-SCNC: 9 MMOL/L (ref 5–15)
AST SERPL-CCNC: 22 U/L (ref 1–40)
AST SERPL-CCNC: 22 U/L (ref 1–40)
BASOPHILS # BLD AUTO: 0.11 10*3/MM3 (ref 0–0.2)
BASOPHILS NFR BLD AUTO: 1.1 % (ref 0–1.5)
BILIRUB CONJ SERPL-MCNC: 0.1 MG/DL (ref 0–0.3)
BILIRUB INDIRECT SERPL-MCNC: 0.1 MG/DL
BILIRUB SERPL-MCNC: 0.2 MG/DL (ref 0–1.2)
BILIRUB SERPL-MCNC: 0.2 MG/DL (ref 0–1.2)
BUN SERPL-MCNC: 12 MG/DL (ref 8–23)
BUN/CREAT SERPL: 48 (ref 7–25)
CALCIUM SPEC-SCNC: 9 MG/DL (ref 8.6–10.5)
CHLORIDE SERPL-SCNC: 96 MMOL/L (ref 98–107)
CK SERPL-CCNC: 60 U/L (ref 20–200)
CO2 SERPL-SCNC: 28 MMOL/L (ref 22–29)
CREAT SERPL-MCNC: 0.25 MG/DL (ref 0.76–1.27)
CRP SERPL-MCNC: 6.09 MG/DL (ref 0–0.5)
DEPRECATED RDW RBC AUTO: 48.6 FL (ref 37–54)
EGFRCR SERPLBLD CKD-EPI 2021: 138.7 ML/MIN/1.73
EOSINOPHIL # BLD AUTO: 0.59 10*3/MM3 (ref 0–0.4)
EOSINOPHIL NFR BLD AUTO: 5.7 % (ref 0.3–6.2)
ERYTHROCYTE [DISTWIDTH] IN BLOOD BY AUTOMATED COUNT: 14.9 % (ref 12.3–15.4)
ERYTHROCYTE [SEDIMENTATION RATE] IN BLOOD: 78 MM/HR (ref 0–20)
GLOBULIN UR ELPH-MCNC: 2.6 GM/DL
GLUCOSE SERPL-MCNC: 100 MG/DL (ref 65–99)
HCT VFR BLD AUTO: 31.7 % (ref 37.5–51)
HGB BLD-MCNC: 10 G/DL (ref 13–17.7)
IMM GRANULOCYTES # BLD AUTO: 0.05 10*3/MM3 (ref 0–0.05)
IMM GRANULOCYTES NFR BLD AUTO: 0.5 % (ref 0–0.5)
LYMPHOCYTES # BLD AUTO: 1.6 10*3/MM3 (ref 0.7–3.1)
LYMPHOCYTES NFR BLD AUTO: 15.4 % (ref 19.6–45.3)
MCH RBC QN AUTO: 28 PG (ref 26.6–33)
MCHC RBC AUTO-ENTMCNC: 31.5 G/DL (ref 31.5–35.7)
MCV RBC AUTO: 88.8 FL (ref 79–97)
MONOCYTES # BLD AUTO: 1.23 10*3/MM3 (ref 0.1–0.9)
MONOCYTES NFR BLD AUTO: 11.9 % (ref 5–12)
NEUTROPHILS NFR BLD AUTO: 6.79 10*3/MM3 (ref 1.7–7)
NEUTROPHILS NFR BLD AUTO: 65.4 % (ref 42.7–76)
NRBC BLD AUTO-RTO: 0 /100 WBC (ref 0–0.2)
PLATELET # BLD AUTO: 350 10*3/MM3 (ref 140–450)
PMV BLD AUTO: 9 FL (ref 6–12)
POTASSIUM SERPL-SCNC: 4.7 MMOL/L (ref 3.5–5.2)
PROT SERPL-MCNC: 6.2 G/DL (ref 6–8.5)
PROT SERPL-MCNC: 6.2 G/DL (ref 6–8.5)
RBC # BLD AUTO: 3.57 10*6/MM3 (ref 4.14–5.8)
SODIUM SERPL-SCNC: 133 MMOL/L (ref 136–145)
WBC NRBC COR # BLD AUTO: 10.37 10*3/MM3 (ref 3.4–10.8)

## 2025-02-10 PROCEDURE — 82248 BILIRUBIN DIRECT: CPT | Performed by: INTERNAL MEDICINE

## 2025-02-10 PROCEDURE — G0299 HHS/HOSPICE OF RN EA 15 MIN: HCPCS

## 2025-02-10 PROCEDURE — 82550 ASSAY OF CK (CPK): CPT | Performed by: INTERNAL MEDICINE

## 2025-02-10 PROCEDURE — 85025 COMPLETE CBC W/AUTO DIFF WBC: CPT | Performed by: INTERNAL MEDICINE

## 2025-02-10 PROCEDURE — 86140 C-REACTIVE PROTEIN: CPT | Performed by: INTERNAL MEDICINE

## 2025-02-10 PROCEDURE — 80053 COMPREHEN METABOLIC PANEL: CPT | Performed by: INTERNAL MEDICINE

## 2025-02-10 PROCEDURE — 85652 RBC SED RATE AUTOMATED: CPT | Performed by: INTERNAL MEDICINE

## 2025-02-11 VITALS — SYSTOLIC BLOOD PRESSURE: 119 MMHG | RESPIRATION RATE: 18 BRPM | TEMPERATURE: 98.5 F | DIASTOLIC BLOOD PRESSURE: 65 MMHG

## 2025-02-12 ENCOUNTER — HOME CARE VISIT (OUTPATIENT)
Dept: HOME HEALTH SERVICES | Facility: HOME HEALTHCARE | Age: 65
End: 2025-02-12
Payer: MEDICARE

## 2025-02-12 ENCOUNTER — TRANSCRIBE ORDERS (OUTPATIENT)
Dept: ADMINISTRATIVE | Facility: HOSPITAL | Age: 65
End: 2025-02-12
Payer: MEDICARE

## 2025-02-12 VITALS
RESPIRATION RATE: 16 BRPM | DIASTOLIC BLOOD PRESSURE: 60 MMHG | OXYGEN SATURATION: 97 % | TEMPERATURE: 98.3 F | HEART RATE: 65 BPM | SYSTOLIC BLOOD PRESSURE: 110 MMHG

## 2025-02-12 DIAGNOSIS — N49.2 ABSCESS OF SCROTUM: Primary | ICD-10-CM

## 2025-02-12 PROCEDURE — G0299 HHS/HOSPICE OF RN EA 15 MIN: HCPCS

## 2025-02-13 ENCOUNTER — HOSPITAL ENCOUNTER (OUTPATIENT)
Dept: INFUSION THERAPY | Facility: HOSPITAL | Age: 65
Discharge: HOME | End: 2025-02-13
Payer: MEDICARE

## 2025-02-13 VITALS
OXYGEN SATURATION: 99 % | DIASTOLIC BLOOD PRESSURE: 74 MMHG | TEMPERATURE: 97 F | SYSTOLIC BLOOD PRESSURE: 94 MMHG | HEART RATE: 90 BPM | RESPIRATION RATE: 16 BRPM

## 2025-02-13 DIAGNOSIS — N49.2 ABSCESS OF SCROTUM: Primary | ICD-10-CM

## 2025-02-13 PROCEDURE — C1894 INTRO/SHEATH, NON-LASER: HCPCS

## 2025-02-13 PROCEDURE — C1751 CATH, INF, PER/CENT/MIDLINE: HCPCS

## 2025-02-13 RX ORDER — SODIUM CHLORIDE 0.9 % (FLUSH) 0.9 %
20 SYRINGE (ML) INJECTION AS NEEDED
Status: DISCONTINUED | OUTPATIENT
Start: 2025-02-13 | End: 2025-02-15 | Stop reason: HOSPADM

## 2025-02-13 RX ORDER — SODIUM CHLORIDE 0.9 % (FLUSH) 0.9 %
10 SYRINGE (ML) INJECTION AS NEEDED
Status: DISCONTINUED | OUTPATIENT
Start: 2025-02-13 | End: 2025-02-15 | Stop reason: HOSPADM

## 2025-02-13 RX ORDER — SODIUM CHLORIDE 9 MG/ML
40 INJECTION, SOLUTION INTRAVENOUS AS NEEDED
Status: DISCONTINUED | OUTPATIENT
Start: 2025-02-13 | End: 2025-02-15 | Stop reason: HOSPADM

## 2025-02-13 RX ORDER — SODIUM CHLORIDE 0.9 % (FLUSH) 0.9 %
10 SYRINGE (ML) INJECTION EVERY 12 HOURS SCHEDULED
Status: DISCONTINUED | OUTPATIENT
Start: 2025-02-13 | End: 2025-02-15 | Stop reason: HOSPADM

## 2025-02-13 NOTE — PROGRESS NOTES
Pt discharged from ir dept s/p picc line placement.  Pt tolerated procedure without complications. Education provided by [facility] nurses.

## 2025-02-17 ENCOUNTER — HOME CARE VISIT (OUTPATIENT)
Dept: HOME HEALTH SERVICES | Facility: HOME HEALTHCARE | Age: 65
End: 2025-02-17
Payer: MEDICARE

## 2025-02-17 ENCOUNTER — LAB REQUISITION (OUTPATIENT)
Dept: LAB | Facility: HOSPITAL | Age: 65
End: 2025-02-17
Payer: MEDICARE

## 2025-02-17 VITALS
HEART RATE: 98 BPM | DIASTOLIC BLOOD PRESSURE: 40 MMHG | SYSTOLIC BLOOD PRESSURE: 90 MMHG | RESPIRATION RATE: 18 BRPM | OXYGEN SATURATION: 95 % | TEMPERATURE: 98.4 F

## 2025-02-17 DIAGNOSIS — N49.2 INFLAMMATORY DISORDERS OF SCROTUM: ICD-10-CM

## 2025-02-17 LAB
ALBUMIN SERPL-MCNC: 3.2 G/DL (ref 3.5–5.2)
ALBUMIN SERPL-MCNC: 3.2 G/DL (ref 3.5–5.2)
ALBUMIN/GLOB SERPL: 1 G/DL
ALP SERPL-CCNC: 70 U/L (ref 39–117)
ALP SERPL-CCNC: 70 U/L (ref 39–117)
ALT SERPL W P-5'-P-CCNC: 20 U/L (ref 1–41)
ALT SERPL W P-5'-P-CCNC: 20 U/L (ref 1–41)
ANION GAP SERPL CALCULATED.3IONS-SCNC: 10 MMOL/L (ref 5–15)
AST SERPL-CCNC: 23 U/L (ref 1–40)
AST SERPL-CCNC: 23 U/L (ref 1–40)
BASOPHILS # BLD AUTO: 0.13 10*3/MM3 (ref 0–0.2)
BASOPHILS NFR BLD AUTO: 1.3 % (ref 0–1.5)
BILIRUB CONJ SERPL-MCNC: 0.1 MG/DL (ref 0–0.3)
BILIRUB INDIRECT SERPL-MCNC: 0.1 MG/DL
BILIRUB SERPL-MCNC: 0.2 MG/DL (ref 0–1.2)
BILIRUB SERPL-MCNC: 0.2 MG/DL (ref 0–1.2)
BUN SERPL-MCNC: 9 MG/DL (ref 8–23)
BUN SERPL-MCNC: 9 MG/DL (ref 8–23)
BUN/CREAT SERPL: 25 (ref 7–25)
CALCIUM SPEC-SCNC: 9.1 MG/DL (ref 8.6–10.5)
CHLORIDE SERPL-SCNC: 96 MMOL/L (ref 98–107)
CK SERPL-CCNC: 57 U/L (ref 20–200)
CO2 SERPL-SCNC: 29 MMOL/L (ref 22–29)
CREAT SERPL-MCNC: 0.36 MG/DL (ref 0.76–1.27)
CRP SERPL-MCNC: 5.3 MG/DL (ref 0–0.5)
DEPRECATED RDW RBC AUTO: 47.6 FL (ref 37–54)
EGFRCR SERPLBLD CKD-EPI 2021: 124.2 ML/MIN/1.73
EOSINOPHIL # BLD AUTO: 0.62 10*3/MM3 (ref 0–0.4)
EOSINOPHIL NFR BLD AUTO: 6.2 % (ref 0.3–6.2)
ERYTHROCYTE [DISTWIDTH] IN BLOOD BY AUTOMATED COUNT: 14.8 % (ref 12.3–15.4)
ERYTHROCYTE [SEDIMENTATION RATE] IN BLOOD: 56 MM/HR (ref 0–20)
GLOBULIN UR ELPH-MCNC: 3.1 GM/DL
GLUCOSE SERPL-MCNC: 87 MG/DL (ref 65–99)
HCT VFR BLD AUTO: 30.8 % (ref 37.5–51)
HGB BLD-MCNC: 9.6 G/DL (ref 13–17.7)
IMM GRANULOCYTES # BLD AUTO: 0.03 10*3/MM3 (ref 0–0.05)
IMM GRANULOCYTES NFR BLD AUTO: 0.3 % (ref 0–0.5)
LYMPHOCYTES # BLD AUTO: 2.04 10*3/MM3 (ref 0.7–3.1)
LYMPHOCYTES NFR BLD AUTO: 20.3 % (ref 19.6–45.3)
MCH RBC QN AUTO: 26.9 PG (ref 26.6–33)
MCHC RBC AUTO-ENTMCNC: 31.2 G/DL (ref 31.5–35.7)
MCV RBC AUTO: 86.3 FL (ref 79–97)
MONOCYTES # BLD AUTO: 1.09 10*3/MM3 (ref 0.1–0.9)
MONOCYTES NFR BLD AUTO: 10.8 % (ref 5–12)
NEUTROPHILS NFR BLD AUTO: 6.16 10*3/MM3 (ref 1.7–7)
NEUTROPHILS NFR BLD AUTO: 61.1 % (ref 42.7–76)
NRBC BLD AUTO-RTO: 0 /100 WBC (ref 0–0.2)
PLATELET # BLD AUTO: 398 10*3/MM3 (ref 140–450)
PMV BLD AUTO: 8.8 FL (ref 6–12)
POTASSIUM SERPL-SCNC: 4.1 MMOL/L (ref 3.5–5.2)
PROT SERPL-MCNC: 6.3 G/DL (ref 6–8.5)
PROT SERPL-MCNC: 6.3 G/DL (ref 6–8.5)
RBC # BLD AUTO: 3.57 10*6/MM3 (ref 4.14–5.8)
SODIUM SERPL-SCNC: 135 MMOL/L (ref 136–145)
WBC NRBC COR # BLD AUTO: 10.07 10*3/MM3 (ref 3.4–10.8)

## 2025-02-17 PROCEDURE — 80053 COMPREHEN METABOLIC PANEL: CPT | Performed by: INTERNAL MEDICINE

## 2025-02-17 PROCEDURE — 85652 RBC SED RATE AUTOMATED: CPT | Performed by: INTERNAL MEDICINE

## 2025-02-17 PROCEDURE — 86140 C-REACTIVE PROTEIN: CPT | Performed by: INTERNAL MEDICINE

## 2025-02-17 PROCEDURE — 82248 BILIRUBIN DIRECT: CPT | Performed by: INTERNAL MEDICINE

## 2025-02-17 PROCEDURE — 85025 COMPLETE CBC W/AUTO DIFF WBC: CPT | Performed by: INTERNAL MEDICINE

## 2025-02-17 PROCEDURE — G0299 HHS/HOSPICE OF RN EA 15 MIN: HCPCS

## 2025-02-17 PROCEDURE — 82550 ASSAY OF CK (CPK): CPT | Performed by: INTERNAL MEDICINE

## 2025-02-20 ENCOUNTER — HOME CARE VISIT (OUTPATIENT)
Dept: HOME HEALTH SERVICES | Facility: HOME HEALTHCARE | Age: 65
End: 2025-02-20
Payer: MEDICARE

## 2025-02-20 PROCEDURE — G0299 HHS/HOSPICE OF RN EA 15 MIN: HCPCS

## 2025-02-21 VITALS
SYSTOLIC BLOOD PRESSURE: 93 MMHG | HEART RATE: 68 BPM | OXYGEN SATURATION: 97 % | RESPIRATION RATE: 18 BRPM | TEMPERATURE: 97.6 F | DIASTOLIC BLOOD PRESSURE: 54 MMHG

## 2025-02-24 ENCOUNTER — LAB REQUISITION (OUTPATIENT)
Dept: LAB | Facility: HOSPITAL | Age: 65
End: 2025-02-24
Payer: MEDICARE

## 2025-02-24 ENCOUNTER — HOME CARE VISIT (OUTPATIENT)
Dept: HOME HEALTH SERVICES | Facility: HOME HEALTHCARE | Age: 65
End: 2025-02-24
Payer: MEDICARE

## 2025-02-24 DIAGNOSIS — N49.2 INFLAMMATORY DISORDERS OF SCROTUM: ICD-10-CM

## 2025-02-24 LAB
ALBUMIN SERPL-MCNC: 3.2 G/DL (ref 3.5–5.2)
ALBUMIN SERPL-MCNC: 3.2 G/DL (ref 3.5–5.2)
ALBUMIN/GLOB SERPL: 1 G/DL
ALP SERPL-CCNC: 63 U/L (ref 39–117)
ALP SERPL-CCNC: 63 U/L (ref 39–117)
ALT SERPL W P-5'-P-CCNC: 14 U/L (ref 1–41)
ALT SERPL W P-5'-P-CCNC: 14 U/L (ref 1–41)
ANION GAP SERPL CALCULATED.3IONS-SCNC: 9 MMOL/L (ref 5–15)
AST SERPL-CCNC: 21 U/L (ref 1–40)
AST SERPL-CCNC: 21 U/L (ref 1–40)
BASOPHILS # BLD AUTO: 0.05 10*3/MM3 (ref 0–0.2)
BASOPHILS NFR BLD AUTO: 0.9 % (ref 0–1.5)
BILIRUB CONJ SERPL-MCNC: 0.1 MG/DL (ref 0–0.3)
BILIRUB INDIRECT SERPL-MCNC: 0.1 MG/DL
BILIRUB SERPL-MCNC: 0.2 MG/DL (ref 0–1.2)
BILIRUB SERPL-MCNC: 0.2 MG/DL (ref 0–1.2)
BUN SERPL-MCNC: 10 MG/DL (ref 8–23)
BUN/CREAT SERPL: 35.7 (ref 7–25)
CALCIUM SPEC-SCNC: 8.8 MG/DL (ref 8.6–10.5)
CHLORIDE SERPL-SCNC: 95 MMOL/L (ref 98–107)
CK SERPL-CCNC: 41 U/L (ref 20–200)
CO2 SERPL-SCNC: 30 MMOL/L (ref 22–29)
CREAT SERPL-MCNC: 0.28 MG/DL (ref 0.76–1.27)
CRP SERPL-MCNC: 5.39 MG/DL (ref 0–0.5)
DEPRECATED RDW RBC AUTO: 46.5 FL (ref 37–54)
EGFRCR SERPLBLD CKD-EPI 2021: 134 ML/MIN/1.73
EOSINOPHIL # BLD AUTO: 0.12 10*3/MM3 (ref 0–0.4)
EOSINOPHIL NFR BLD AUTO: 2.1 % (ref 0.3–6.2)
ERYTHROCYTE [DISTWIDTH] IN BLOOD BY AUTOMATED COUNT: 14.7 % (ref 12.3–15.4)
ERYTHROCYTE [SEDIMENTATION RATE] IN BLOOD: 52 MM/HR (ref 0–20)
GLOBULIN UR ELPH-MCNC: 3.1 GM/DL
GLUCOSE SERPL-MCNC: 86 MG/DL (ref 65–99)
HCT VFR BLD AUTO: 30.9 % (ref 37.5–51)
HGB BLD-MCNC: 9.8 G/DL (ref 13–17.7)
IMM GRANULOCYTES # BLD AUTO: 0.01 10*3/MM3 (ref 0–0.05)
IMM GRANULOCYTES NFR BLD AUTO: 0.2 % (ref 0–0.5)
LYMPHOCYTES # BLD AUTO: 0.73 10*3/MM3 (ref 0.7–3.1)
LYMPHOCYTES NFR BLD AUTO: 13.1 % (ref 19.6–45.3)
MCH RBC QN AUTO: 27.4 PG (ref 26.6–33)
MCHC RBC AUTO-ENTMCNC: 31.7 G/DL (ref 31.5–35.7)
MCV RBC AUTO: 86.3 FL (ref 79–97)
MONOCYTES # BLD AUTO: 0.74 10*3/MM3 (ref 0.1–0.9)
MONOCYTES NFR BLD AUTO: 13.2 % (ref 5–12)
NEUTROPHILS NFR BLD AUTO: 3.94 10*3/MM3 (ref 1.7–7)
NEUTROPHILS NFR BLD AUTO: 70.5 % (ref 42.7–76)
NRBC BLD AUTO-RTO: 0 /100 WBC (ref 0–0.2)
PLATELET # BLD AUTO: 273 10*3/MM3 (ref 140–450)
PMV BLD AUTO: 9.5 FL (ref 6–12)
POTASSIUM SERPL-SCNC: 4.5 MMOL/L (ref 3.5–5.2)
PROT SERPL-MCNC: 6.3 G/DL (ref 6–8.5)
PROT SERPL-MCNC: 6.3 G/DL (ref 6–8.5)
RBC # BLD AUTO: 3.58 10*6/MM3 (ref 4.14–5.8)
SODIUM SERPL-SCNC: 134 MMOL/L (ref 136–145)
WBC NRBC COR # BLD AUTO: 5.59 10*3/MM3 (ref 3.4–10.8)

## 2025-02-24 PROCEDURE — 86140 C-REACTIVE PROTEIN: CPT | Performed by: INTERNAL MEDICINE

## 2025-02-24 PROCEDURE — 82550 ASSAY OF CK (CPK): CPT | Performed by: INTERNAL MEDICINE

## 2025-02-24 PROCEDURE — 82248 BILIRUBIN DIRECT: CPT | Performed by: INTERNAL MEDICINE

## 2025-02-24 PROCEDURE — G0299 HHS/HOSPICE OF RN EA 15 MIN: HCPCS

## 2025-02-24 PROCEDURE — 80053 COMPREHEN METABOLIC PANEL: CPT | Performed by: INTERNAL MEDICINE

## 2025-02-24 PROCEDURE — 85025 COMPLETE CBC W/AUTO DIFF WBC: CPT | Performed by: INTERNAL MEDICINE

## 2025-02-24 PROCEDURE — 85652 RBC SED RATE AUTOMATED: CPT | Performed by: INTERNAL MEDICINE

## 2025-02-26 VITALS
SYSTOLIC BLOOD PRESSURE: 86 MMHG | TEMPERATURE: 98 F | RESPIRATION RATE: 18 BRPM | DIASTOLIC BLOOD PRESSURE: 47 MMHG | OXYGEN SATURATION: 98 % | HEART RATE: 66 BPM

## 2025-03-03 ENCOUNTER — HOME CARE VISIT (OUTPATIENT)
Dept: HOME HEALTH SERVICES | Facility: HOME HEALTHCARE | Age: 65
End: 2025-03-03
Payer: MEDICARE

## 2025-03-03 ENCOUNTER — LAB REQUISITION (OUTPATIENT)
Dept: LAB | Facility: HOSPITAL | Age: 65
End: 2025-03-03
Payer: MEDICARE

## 2025-03-03 DIAGNOSIS — N49.2 INFLAMMATORY DISORDERS OF SCROTUM: ICD-10-CM

## 2025-03-03 LAB
ALBUMIN SERPL-MCNC: 3 G/DL (ref 3.5–5.2)
ALBUMIN SERPL-MCNC: 3 G/DL (ref 3.5–5.2)
ALBUMIN/GLOB SERPL: 1.3 G/DL
ALP SERPL-CCNC: 62 U/L (ref 39–117)
ALP SERPL-CCNC: 62 U/L (ref 39–117)
ALT SERPL W P-5'-P-CCNC: 12 U/L (ref 1–41)
ALT SERPL W P-5'-P-CCNC: 12 U/L (ref 1–41)
ANION GAP SERPL CALCULATED.3IONS-SCNC: 10 MMOL/L (ref 5–15)
AST SERPL-CCNC: 18 U/L (ref 1–40)
AST SERPL-CCNC: 18 U/L (ref 1–40)
BASOPHILS # BLD AUTO: 0.06 10*3/MM3 (ref 0–0.2)
BASOPHILS NFR BLD AUTO: 0.7 % (ref 0–1.5)
BILIRUB CONJ SERPL-MCNC: 0.1 MG/DL (ref 0–0.3)
BILIRUB INDIRECT SERPL-MCNC: 0.1 MG/DL
BILIRUB SERPL-MCNC: 0.2 MG/DL (ref 0–1.2)
BILIRUB SERPL-MCNC: 0.2 MG/DL (ref 0–1.2)
BUN SERPL-MCNC: 8 MG/DL (ref 8–23)
BUN SERPL-MCNC: 8 MG/DL (ref 8–23)
BUN/CREAT SERPL: 27.6 (ref 7–25)
CALCIUM SPEC-SCNC: 8.1 MG/DL (ref 8.6–10.5)
CHLORIDE SERPL-SCNC: 95 MMOL/L (ref 98–107)
CK SERPL-CCNC: 48 U/L (ref 20–200)
CO2 SERPL-SCNC: 27 MMOL/L (ref 22–29)
CREAT SERPL-MCNC: 0.29 MG/DL (ref 0.76–1.27)
CRP SERPL-MCNC: 5.25 MG/DL (ref 0–0.5)
DEPRECATED RDW RBC AUTO: 47.2 FL (ref 37–54)
EGFRCR SERPLBLD CKD-EPI 2021: 132.6 ML/MIN/1.73
EOSINOPHIL # BLD AUTO: 0.51 10*3/MM3 (ref 0–0.4)
EOSINOPHIL NFR BLD AUTO: 6.3 % (ref 0.3–6.2)
ERYTHROCYTE [DISTWIDTH] IN BLOOD BY AUTOMATED COUNT: 15.1 % (ref 12.3–15.4)
ERYTHROCYTE [SEDIMENTATION RATE] IN BLOOD: 45 MM/HR (ref 0–20)
GLOBULIN UR ELPH-MCNC: 2.3 GM/DL
GLUCOSE SERPL-MCNC: 99 MG/DL (ref 65–99)
HCT VFR BLD AUTO: 29.6 % (ref 37.5–51)
HGB BLD-MCNC: 9.4 G/DL (ref 13–17.7)
IMM GRANULOCYTES # BLD AUTO: 0.03 10*3/MM3 (ref 0–0.05)
IMM GRANULOCYTES NFR BLD AUTO: 0.4 % (ref 0–0.5)
LYMPHOCYTES # BLD AUTO: 1.29 10*3/MM3 (ref 0.7–3.1)
LYMPHOCYTES NFR BLD AUTO: 16 % (ref 19.6–45.3)
MCH RBC QN AUTO: 27 PG (ref 26.6–33)
MCHC RBC AUTO-ENTMCNC: 31.8 G/DL (ref 31.5–35.7)
MCV RBC AUTO: 85.1 FL (ref 79–97)
MONOCYTES # BLD AUTO: 0.68 10*3/MM3 (ref 0.1–0.9)
MONOCYTES NFR BLD AUTO: 8.4 % (ref 5–12)
NEUTROPHILS NFR BLD AUTO: 5.51 10*3/MM3 (ref 1.7–7)
NEUTROPHILS NFR BLD AUTO: 68.2 % (ref 42.7–76)
NRBC BLD AUTO-RTO: 0 /100 WBC (ref 0–0.2)
PLATELET # BLD AUTO: 335 10*3/MM3 (ref 140–450)
PMV BLD AUTO: 9.7 FL (ref 6–12)
POTASSIUM SERPL-SCNC: 4.1 MMOL/L (ref 3.5–5.2)
PROT SERPL-MCNC: 5.3 G/DL (ref 6–8.5)
PROT SERPL-MCNC: 5.3 G/DL (ref 6–8.5)
RBC # BLD AUTO: 3.48 10*6/MM3 (ref 4.14–5.8)
SODIUM SERPL-SCNC: 132 MMOL/L (ref 136–145)
WBC NRBC COR # BLD AUTO: 8.08 10*3/MM3 (ref 3.4–10.8)

## 2025-03-03 PROCEDURE — 85025 COMPLETE CBC W/AUTO DIFF WBC: CPT | Performed by: INTERNAL MEDICINE

## 2025-03-03 PROCEDURE — 86140 C-REACTIVE PROTEIN: CPT | Performed by: INTERNAL MEDICINE

## 2025-03-03 PROCEDURE — 85652 RBC SED RATE AUTOMATED: CPT | Performed by: INTERNAL MEDICINE

## 2025-03-03 PROCEDURE — 82550 ASSAY OF CK (CPK): CPT | Performed by: INTERNAL MEDICINE

## 2025-03-03 PROCEDURE — 80053 COMPREHEN METABOLIC PANEL: CPT | Performed by: INTERNAL MEDICINE

## 2025-03-03 PROCEDURE — G0299 HHS/HOSPICE OF RN EA 15 MIN: HCPCS

## 2025-03-03 PROCEDURE — 82248 BILIRUBIN DIRECT: CPT | Performed by: INTERNAL MEDICINE

## 2025-03-04 ENCOUNTER — HOME CARE VISIT (OUTPATIENT)
Dept: HOME HEALTH SERVICES | Facility: HOME HEALTHCARE | Age: 65
End: 2025-03-04
Payer: MEDICARE

## 2025-03-04 VITALS
TEMPERATURE: 98.1 F | OXYGEN SATURATION: 97 % | RESPIRATION RATE: 18 BRPM | HEART RATE: 66 BPM | SYSTOLIC BLOOD PRESSURE: 100 MMHG | DIASTOLIC BLOOD PRESSURE: 55 MMHG

## 2025-03-10 ENCOUNTER — LAB REQUISITION (OUTPATIENT)
Dept: LAB | Facility: HOSPITAL | Age: 65
End: 2025-03-10
Payer: MEDICARE

## 2025-03-10 ENCOUNTER — HOME CARE VISIT (OUTPATIENT)
Dept: HOME HEALTH SERVICES | Facility: HOME HEALTHCARE | Age: 65
End: 2025-03-10
Payer: MEDICARE

## 2025-03-10 DIAGNOSIS — N49.2 INFLAMMATORY DISORDERS OF SCROTUM: ICD-10-CM

## 2025-03-10 LAB
ALBUMIN SERPL-MCNC: 2.8 G/DL (ref 3.5–5.2)
ALP SERPL-CCNC: 60 U/L (ref 39–117)
ALT SERPL W P-5'-P-CCNC: 12 U/L (ref 1–41)
AST SERPL-CCNC: 15 U/L (ref 1–40)
BASOPHILS # BLD AUTO: 0.07 10*3/MM3 (ref 0–0.2)
BASOPHILS NFR BLD AUTO: 0.7 % (ref 0–1.5)
BILIRUB CONJ SERPL-MCNC: 0.1 MG/DL (ref 0–0.3)
BILIRUB INDIRECT SERPL-MCNC: 0.1 MG/DL
BILIRUB SERPL-MCNC: 0.2 MG/DL (ref 0–1.2)
BUN SERPL-MCNC: 9 MG/DL (ref 8–23)
CK SERPL-CCNC: 45 U/L (ref 20–200)
CREAT SERPL-MCNC: 0.38 MG/DL (ref 0.76–1.27)
CRP SERPL-MCNC: 5.31 MG/DL (ref 0–0.5)
DEPRECATED RDW RBC AUTO: 46.7 FL (ref 37–54)
EGFRCR SERPLBLD CKD-EPI 2021: 122.2 ML/MIN/1.73
EOSINOPHIL # BLD AUTO: 0.42 10*3/MM3 (ref 0–0.4)
EOSINOPHIL NFR BLD AUTO: 4.3 % (ref 0.3–6.2)
ERYTHROCYTE [DISTWIDTH] IN BLOOD BY AUTOMATED COUNT: 15.3 % (ref 12.3–15.4)
ERYTHROCYTE [SEDIMENTATION RATE] IN BLOOD: 44 MM/HR (ref 0–20)
HCT VFR BLD AUTO: 28.6 % (ref 37.5–51)
HGB BLD-MCNC: 9.2 G/DL (ref 13–17.7)
IMM GRANULOCYTES # BLD AUTO: 0.03 10*3/MM3 (ref 0–0.05)
IMM GRANULOCYTES NFR BLD AUTO: 0.3 % (ref 0–0.5)
LYMPHOCYTES # BLD AUTO: 1.31 10*3/MM3 (ref 0.7–3.1)
LYMPHOCYTES NFR BLD AUTO: 13.4 % (ref 19.6–45.3)
MCH RBC QN AUTO: 26.8 PG (ref 26.6–33)
MCHC RBC AUTO-ENTMCNC: 32.2 G/DL (ref 31.5–35.7)
MCV RBC AUTO: 83.4 FL (ref 79–97)
MONOCYTES # BLD AUTO: 0.91 10*3/MM3 (ref 0.1–0.9)
MONOCYTES NFR BLD AUTO: 9.3 % (ref 5–12)
NEUTROPHILS NFR BLD AUTO: 7.03 10*3/MM3 (ref 1.7–7)
NEUTROPHILS NFR BLD AUTO: 72 % (ref 42.7–76)
NRBC BLD AUTO-RTO: 0 /100 WBC (ref 0–0.2)
PLATELET # BLD AUTO: 339 10*3/MM3 (ref 140–450)
PMV BLD AUTO: 9.8 FL (ref 6–12)
PROT SERPL-MCNC: 5.7 G/DL (ref 6–8.5)
RBC # BLD AUTO: 3.43 10*6/MM3 (ref 4.14–5.8)
WBC NRBC COR # BLD AUTO: 9.77 10*3/MM3 (ref 3.4–10.8)

## 2025-03-10 PROCEDURE — 85652 RBC SED RATE AUTOMATED: CPT | Performed by: INTERNAL MEDICINE

## 2025-03-10 PROCEDURE — 82550 ASSAY OF CK (CPK): CPT | Performed by: INTERNAL MEDICINE

## 2025-03-10 PROCEDURE — 84520 ASSAY OF UREA NITROGEN: CPT | Performed by: INTERNAL MEDICINE

## 2025-03-10 PROCEDURE — 86140 C-REACTIVE PROTEIN: CPT | Performed by: INTERNAL MEDICINE

## 2025-03-10 PROCEDURE — 80076 HEPATIC FUNCTION PANEL: CPT | Performed by: INTERNAL MEDICINE

## 2025-03-10 PROCEDURE — 85025 COMPLETE CBC W/AUTO DIFF WBC: CPT | Performed by: INTERNAL MEDICINE

## 2025-03-10 PROCEDURE — G0299 HHS/HOSPICE OF RN EA 15 MIN: HCPCS

## 2025-03-10 PROCEDURE — 82565 ASSAY OF CREATININE: CPT | Performed by: INTERNAL MEDICINE

## 2025-03-12 VITALS
SYSTOLIC BLOOD PRESSURE: 112 MMHG | OXYGEN SATURATION: 93 % | RESPIRATION RATE: 18 BRPM | HEART RATE: 80 BPM | DIASTOLIC BLOOD PRESSURE: 66 MMHG | TEMPERATURE: 97.8 F

## 2025-03-18 ENCOUNTER — HOME CARE VISIT (OUTPATIENT)
Dept: HOME HEALTH SERVICES | Facility: HOME HEALTHCARE | Age: 65
End: 2025-03-18
Payer: MEDICARE

## 2025-03-18 PROCEDURE — G0299 HHS/HOSPICE OF RN EA 15 MIN: HCPCS

## 2025-03-20 VITALS
DIASTOLIC BLOOD PRESSURE: 88 MMHG | OXYGEN SATURATION: 97 % | TEMPERATURE: 97.8 F | SYSTOLIC BLOOD PRESSURE: 132 MMHG | RESPIRATION RATE: 18 BRPM | HEART RATE: 88 BPM

## 2025-03-21 ENCOUNTER — HOME CARE VISIT (OUTPATIENT)
Dept: HOME HEALTH SERVICES | Facility: HOME HEALTHCARE | Age: 65
End: 2025-03-21
Payer: MEDICARE

## 2025-03-25 ENCOUNTER — LAB REQUISITION (OUTPATIENT)
Dept: LAB | Facility: HOSPITAL | Age: 65
End: 2025-03-25
Payer: MEDICARE

## 2025-03-25 ENCOUNTER — HOME CARE VISIT (OUTPATIENT)
Dept: HOME HEALTH SERVICES | Facility: HOME HEALTHCARE | Age: 65
End: 2025-03-25
Payer: MEDICARE

## 2025-03-25 DIAGNOSIS — N49.2 INFLAMMATORY DISORDERS OF SCROTUM: ICD-10-CM

## 2025-03-25 LAB
ALBUMIN SERPL-MCNC: 3 G/DL (ref 3.5–5.2)
ALP SERPL-CCNC: 63 U/L (ref 39–117)
ALT SERPL W P-5'-P-CCNC: 14 U/L (ref 1–41)
AST SERPL-CCNC: 21 U/L (ref 1–40)
BASOPHILS # BLD AUTO: 0.11 10*3/MM3 (ref 0–0.2)
BASOPHILS NFR BLD AUTO: 1.1 % (ref 0–1.5)
BILIRUB CONJ SERPL-MCNC: 0.1 MG/DL (ref 0–0.3)
BILIRUB INDIRECT SERPL-MCNC: 0.1 MG/DL
BILIRUB SERPL-MCNC: 0.2 MG/DL (ref 0–1.2)
BUN SERPL-MCNC: 9 MG/DL (ref 8–23)
CK SERPL-CCNC: 114 U/L (ref 20–200)
CREAT SERPL-MCNC: 0.26 MG/DL (ref 0.76–1.27)
CRP SERPL-MCNC: 5.91 MG/DL (ref 0–0.5)
DEPRECATED RDW RBC AUTO: 48 FL (ref 37–54)
EGFRCR SERPLBLD CKD-EPI 2021: 137.1 ML/MIN/1.73
EOSINOPHIL # BLD AUTO: 1.19 10*3/MM3 (ref 0–0.4)
EOSINOPHIL NFR BLD AUTO: 11.5 % (ref 0.3–6.2)
ERYTHROCYTE [DISTWIDTH] IN BLOOD BY AUTOMATED COUNT: 15.9 % (ref 12.3–15.4)
ERYTHROCYTE [SEDIMENTATION RATE] IN BLOOD: 85 MM/HR (ref 0–20)
HCT VFR BLD AUTO: 27.4 % (ref 37.5–51)
HGB BLD-MCNC: 8.6 G/DL (ref 13–17.7)
IMM GRANULOCYTES # BLD AUTO: 0.05 10*3/MM3 (ref 0–0.05)
IMM GRANULOCYTES NFR BLD AUTO: 0.5 % (ref 0–0.5)
LYMPHOCYTES # BLD AUTO: 1.55 10*3/MM3 (ref 0.7–3.1)
LYMPHOCYTES NFR BLD AUTO: 15 % (ref 19.6–45.3)
MCH RBC QN AUTO: 26.1 PG (ref 26.6–33)
MCHC RBC AUTO-ENTMCNC: 31.4 G/DL (ref 31.5–35.7)
MCV RBC AUTO: 83 FL (ref 79–97)
MONOCYTES # BLD AUTO: 0.93 10*3/MM3 (ref 0.1–0.9)
MONOCYTES NFR BLD AUTO: 9 % (ref 5–12)
NEUTROPHILS NFR BLD AUTO: 6.48 10*3/MM3 (ref 1.7–7)
NEUTROPHILS NFR BLD AUTO: 62.9 % (ref 42.7–76)
NRBC BLD AUTO-RTO: 0 /100 WBC (ref 0–0.2)
PLATELET # BLD AUTO: 388 10*3/MM3 (ref 140–450)
PMV BLD AUTO: 9.4 FL (ref 6–12)
PROT SERPL-MCNC: 6.4 G/DL (ref 6–8.5)
RBC # BLD AUTO: 3.3 10*6/MM3 (ref 4.14–5.8)
WBC NRBC COR # BLD AUTO: 10.31 10*3/MM3 (ref 3.4–10.8)

## 2025-03-25 PROCEDURE — 82550 ASSAY OF CK (CPK): CPT | Performed by: INTERNAL MEDICINE

## 2025-03-25 PROCEDURE — G0299 HHS/HOSPICE OF RN EA 15 MIN: HCPCS

## 2025-03-25 PROCEDURE — 82565 ASSAY OF CREATININE: CPT | Performed by: INTERNAL MEDICINE

## 2025-03-25 PROCEDURE — 80076 HEPATIC FUNCTION PANEL: CPT | Performed by: INTERNAL MEDICINE

## 2025-03-25 PROCEDURE — 86140 C-REACTIVE PROTEIN: CPT | Performed by: INTERNAL MEDICINE

## 2025-03-25 PROCEDURE — 84520 ASSAY OF UREA NITROGEN: CPT | Performed by: INTERNAL MEDICINE

## 2025-03-25 PROCEDURE — 85025 COMPLETE CBC W/AUTO DIFF WBC: CPT | Performed by: INTERNAL MEDICINE

## 2025-03-25 PROCEDURE — 85652 RBC SED RATE AUTOMATED: CPT | Performed by: INTERNAL MEDICINE

## 2025-04-01 ENCOUNTER — LAB REQUISITION (OUTPATIENT)
Dept: LAB | Facility: HOSPITAL | Age: 65
End: 2025-04-01
Payer: MEDICARE

## 2025-04-01 ENCOUNTER — HOME CARE VISIT (OUTPATIENT)
Dept: HOME HEALTH SERVICES | Facility: HOME HEALTHCARE | Age: 65
End: 2025-04-01
Payer: MEDICARE

## 2025-04-01 DIAGNOSIS — N49.2 INFLAMMATORY DISORDERS OF SCROTUM: ICD-10-CM

## 2025-04-01 LAB
ALBUMIN SERPL-MCNC: 3.3 G/DL (ref 3.5–5.2)
ALP SERPL-CCNC: 70 U/L (ref 39–117)
ALT SERPL W P-5'-P-CCNC: 11 U/L (ref 1–41)
AST SERPL-CCNC: 17 U/L (ref 1–40)
BASOPHILS # BLD AUTO: 0.11 10*3/MM3 (ref 0–0.2)
BASOPHILS NFR BLD AUTO: 1.6 % (ref 0–1.5)
BILIRUB CONJ SERPL-MCNC: 0.1 MG/DL (ref 0–0.3)
BILIRUB INDIRECT SERPL-MCNC: 0.1 MG/DL
BILIRUB SERPL-MCNC: 0.2 MG/DL (ref 0–1.2)
BUN SERPL-MCNC: 13 MG/DL (ref 8–23)
CK SERPL-CCNC: 61 U/L (ref 20–200)
CREAT SERPL-MCNC: 0.32 MG/DL (ref 0.76–1.27)
CRP SERPL-MCNC: 6.47 MG/DL (ref 0–0.5)
DEPRECATED RDW RBC AUTO: 46.5 FL (ref 37–54)
EGFRCR SERPLBLD CKD-EPI 2021: 128.7 ML/MIN/1.73
EOSINOPHIL # BLD AUTO: 0.5 10*3/MM3 (ref 0–0.4)
EOSINOPHIL NFR BLD AUTO: 7.4 % (ref 0.3–6.2)
ERYTHROCYTE [DISTWIDTH] IN BLOOD BY AUTOMATED COUNT: 15.8 % (ref 12.3–15.4)
ERYTHROCYTE [SEDIMENTATION RATE] IN BLOOD: 79 MM/HR (ref 0–20)
HCT VFR BLD AUTO: 37.7 % (ref 37.5–51)
HGB BLD-MCNC: 11.8 G/DL (ref 13–17.7)
IMM GRANULOCYTES # BLD AUTO: 0.02 10*3/MM3 (ref 0–0.05)
IMM GRANULOCYTES NFR BLD AUTO: 0.3 % (ref 0–0.5)
LYMPHOCYTES # BLD AUTO: 1.3 10*3/MM3 (ref 0.7–3.1)
LYMPHOCYTES NFR BLD AUTO: 19.2 % (ref 19.6–45.3)
MCH RBC QN AUTO: 25.8 PG (ref 26.6–33)
MCHC RBC AUTO-ENTMCNC: 31.3 G/DL (ref 31.5–35.7)
MCV RBC AUTO: 82.3 FL (ref 79–97)
MONOCYTES # BLD AUTO: 0.52 10*3/MM3 (ref 0.1–0.9)
MONOCYTES NFR BLD AUTO: 7.7 % (ref 5–12)
NEUTROPHILS NFR BLD AUTO: 4.31 10*3/MM3 (ref 1.7–7)
NEUTROPHILS NFR BLD AUTO: 63.8 % (ref 42.7–76)
NRBC BLD AUTO-RTO: 0 /100 WBC (ref 0–0.2)
PLATELET # BLD AUTO: 371 10*3/MM3 (ref 140–450)
PMV BLD AUTO: 9.3 FL (ref 6–12)
PROT SERPL-MCNC: 6.4 G/DL (ref 6–8.5)
RBC # BLD AUTO: 4.58 10*6/MM3 (ref 4.14–5.8)
WBC NRBC COR # BLD AUTO: 6.76 10*3/MM3 (ref 3.4–10.8)

## 2025-04-01 PROCEDURE — 84520 ASSAY OF UREA NITROGEN: CPT | Performed by: INTERNAL MEDICINE

## 2025-04-01 PROCEDURE — 82565 ASSAY OF CREATININE: CPT | Performed by: INTERNAL MEDICINE

## 2025-04-01 PROCEDURE — 85025 COMPLETE CBC W/AUTO DIFF WBC: CPT | Performed by: INTERNAL MEDICINE

## 2025-04-01 PROCEDURE — G0299 HHS/HOSPICE OF RN EA 15 MIN: HCPCS

## 2025-04-01 PROCEDURE — 86140 C-REACTIVE PROTEIN: CPT | Performed by: INTERNAL MEDICINE

## 2025-04-01 PROCEDURE — 82550 ASSAY OF CK (CPK): CPT | Performed by: INTERNAL MEDICINE

## 2025-04-01 PROCEDURE — 85652 RBC SED RATE AUTOMATED: CPT | Performed by: INTERNAL MEDICINE

## 2025-04-01 PROCEDURE — 80076 HEPATIC FUNCTION PANEL: CPT | Performed by: INTERNAL MEDICINE

## 2025-04-01 NOTE — REMOTE PATIENT MONITORING NOTE
89/46  97.5  100  71    Wound  Care  Neph carpicc care and labs     4x4 optifoam  Sure site  Sterile 4x4  Abd pads

## 2025-04-07 VITALS
HEART RATE: 77 BPM | RESPIRATION RATE: 18 BRPM | TEMPERATURE: 97.2 F | OXYGEN SATURATION: 98 % | DIASTOLIC BLOOD PRESSURE: 66 MMHG | SYSTOLIC BLOOD PRESSURE: 126 MMHG

## 2025-04-08 VITALS
SYSTOLIC BLOOD PRESSURE: 90 MMHG | TEMPERATURE: 97.5 F | RESPIRATION RATE: 18 BRPM | OXYGEN SATURATION: 100 % | HEART RATE: 71 BPM | DIASTOLIC BLOOD PRESSURE: 56 MMHG

## 2025-04-09 ENCOUNTER — HOME CARE VISIT (OUTPATIENT)
Dept: HOME HEALTH SERVICES | Facility: HOME HEALTHCARE | Age: 65
End: 2025-04-09
Payer: MEDICARE

## 2025-04-09 PROCEDURE — G0299 HHS/HOSPICE OF RN EA 15 MIN: HCPCS

## 2025-04-09 NOTE — REMOTE PATIENT MONITORING NOTE
Urology yesterday changed syprapubic was at hospital Sunday clear yellow urine     Had labs at [facility] x 2    98/50  65    Thursday surgery at   Pain 8 with morphine on board     Fluconazole    Cipro 500 2 times daily no more I’ve anbx started Monday     Picc intact changed dressing   Neph tubes changed dressing

## 2025-04-10 VITALS
DIASTOLIC BLOOD PRESSURE: 60 MMHG | SYSTOLIC BLOOD PRESSURE: 98 MMHG | OXYGEN SATURATION: 97 % | TEMPERATURE: 97.8 F | RESPIRATION RATE: 18 BRPM | HEART RATE: 77 BPM

## 2025-04-11 ENCOUNTER — LAB REQUISITION (OUTPATIENT)
Dept: LAB | Facility: HOSPITAL | Age: 65
End: 2025-04-11
Payer: MEDICARE

## 2025-04-11 ENCOUNTER — HOME CARE VISIT (OUTPATIENT)
Dept: HOME HEALTH SERVICES | Facility: HOME HEALTHCARE | Age: 65
End: 2025-04-11
Payer: MEDICARE

## 2025-04-11 DIAGNOSIS — N49.2 INFLAMMATORY DISORDERS OF SCROTUM: ICD-10-CM

## 2025-04-11 LAB — CK SERPL-CCNC: 108 U/L (ref 20–200)

## 2025-04-11 PROCEDURE — G0299 HHS/HOSPICE OF RN EA 15 MIN: HCPCS

## 2025-04-11 PROCEDURE — 82550 ASSAY OF CK (CPK): CPT | Performed by: INTERNAL MEDICINE

## 2025-04-15 ENCOUNTER — HOME CARE VISIT (OUTPATIENT)
Dept: HOME HEALTH SERVICES | Facility: HOME HEALTHCARE | Age: 65
End: 2025-04-15
Payer: MEDICARE

## 2025-04-15 PROCEDURE — G0299 HHS/HOSPICE OF RN EA 15 MIN: HCPCS

## 2025-04-15 NOTE — REMOTE PATIENT MONITORING NOTE
Neph tube dressings changed   Pic dressing changed   Suprapubic in place   Thursday surgery   Iv push only   Patient having some diphoresis

## 2025-04-22 VITALS
HEART RATE: 80 BPM | DIASTOLIC BLOOD PRESSURE: 56 MMHG | TEMPERATURE: 97.8 F | OXYGEN SATURATION: 98 % | RESPIRATION RATE: 18 BRPM | RESPIRATION RATE: 18 BRPM | TEMPERATURE: 97.8 F | SYSTOLIC BLOOD PRESSURE: 96 MMHG | DIASTOLIC BLOOD PRESSURE: 66 MMHG | HEART RATE: 77 BPM | OXYGEN SATURATION: 99 % | SYSTOLIC BLOOD PRESSURE: 120 MMHG

## 2025-04-25 ENCOUNTER — HOME CARE VISIT (OUTPATIENT)
Dept: HOME HEALTH SERVICES | Facility: HOME HEALTHCARE | Age: 65
End: 2025-04-25
Payer: MEDICARE

## 2025-05-04 ENCOUNTER — READMISSION MANAGEMENT (OUTPATIENT)
Dept: CALL CENTER | Facility: HOSPITAL | Age: 65
End: 2025-05-04
Payer: MEDICARE

## 2025-05-04 NOTE — OUTREACH NOTE
Prep Survey      Flowsheet Row Responses   [facility] facility patient discharged from? Non-[facility]   Is LACE score < 7 ? Non-BH Discharge   Eligibility [facility]   Date of Admission 04/17/25   Date of Discharge 05/04/25   Discharge diagnosis Neurogenic bladder   Does the patient have one of the following disease processes/diagnoses(primary or secondary)? Other   Prep survey completed? Yes            JACKSON DEY - Registered Nurse

## 2025-05-05 ENCOUNTER — TRANSITIONAL CARE MANAGEMENT TELEPHONE ENCOUNTER (OUTPATIENT)
Dept: CALL CENTER | Facility: HOSPITAL | Age: 65
End: 2025-05-05
Payer: MEDICARE

## 2025-05-05 NOTE — OUTREACH NOTE
Call Center TCM Note      Flowsheet Row Responses   [facility] [place] [facility]   Does the patient have one of the following disease processes/diagnoses(primary or secondary)? Other   TCM attempt successful? No   Unsuccessful attempts Attempt 2            Carmita H - Registered Nurse    5/5/2025, 13:48 EDT

## 2025-05-05 NOTE — OUTREACH NOTE
Call Center TCM Note      Flowsheet Row Responses   [facility] patient discharged from? Non-BH   Does the patient have one of the following disease processes/diagnoses(primary or secondary)? Other   TCM attempt successful? No   Unsuccessful attempts Attempt 1  [Jennifer friend on verbal release,  no answer]            Carmita CAO - Registered Nurse    5/5/2025, 13:27 EDT

## 2025-05-06 ENCOUNTER — TRANSITIONAL CARE MANAGEMENT TELEPHONE ENCOUNTER (OUTPATIENT)
Dept: CALL CENTER | Facility: HOSPITAL | Age: 65
End: 2025-05-06
Payer: MEDICARE

## 2025-05-06 NOTE — OUTREACH NOTE
Call Center TCM Note      Flowsheet Row Responses   [facility] patient discharged from? Non-BH   Does the patient have one of the following disease processes/diagnoses(primary or secondary)? Other   TCM attempt successful? No   Unsuccessful attempts Attempt 3  [Jennifer friend on verbal release ,  no answer]            Carmita CAO - Registered Nurse    5/6/2025, 08:32 EDT

## 2025-06-24 NOTE — PLAN OF CARE
Detail Level: Detailed Problem: Patient Care Overview  Goal: Plan of Care Review  Outcome: Ongoing (interventions implemented as appropriate)   02/05/19 0414   Coping/Psychosocial   Plan of Care Reviewed With patient   Plan of Care Review   Progress no change   OTHER   Outcome Summary VSS. Pressure wound on right gluetus changed and packed with 4x4s and Dakin's solution. No complaints of pain. Pt's caregiveer disimpacted pt per pt request. Will continue to monitor.      Goal: Individualization and Mutuality  Outcome: Ongoing (interventions implemented as appropriate)    Goal: Discharge Needs Assessment  Outcome: Ongoing (interventions implemented as appropriate)    Goal: Interprofessional Rounds/Family Conf  Outcome: Ongoing (interventions implemented as appropriate)      Problem: Fall Risk (Adult)  Goal: Identify Related Risk Factors and Signs and Symptoms  Outcome: Outcome(s) achieved Date Met: 02/05/19    Goal: Absence of Fall  Outcome: Ongoing (interventions implemented as appropriate)      Problem: Skin Injury Risk (Adult)  Goal: Identify Related Risk Factors and Signs and Symptoms  Outcome: Outcome(s) achieved Date Met: 02/05/19    Goal: Skin Health and Integrity  Outcome: Ongoing (interventions implemented as appropriate)      Problem: Infection, Risk/Actual (Adult)  Goal: Identify Related Risk Factors and Signs and Symptoms  Outcome: Outcome(s) achieved Date Met: 02/05/19    Goal: Infection Prevention/Resolution  Outcome: Ongoing (interventions implemented as appropriate)         Additional Notes: Surgical Mortality Probability (SMPM) tool was used to calculate the patient's 30-day mortality risk index for non-cardiac surgery. This score is used to communicate risk information to patients prior to surgery and guide management at the point-of-care. This patient's specific risk calculation resulted in a score of 0-4pts which indicates <0.5% mortality rate and is low risk. No additional interventions or referrals are indicated. Quality 358: Patient-Centered Surgical Risk Assessment And Communication: Documentation of patient-specific risk assessment with a risk calculator based on multi-institutional clinical data, the specific risk calculator used, and communication of risk assessment from risk calculator with the patient or family.